# Patient Record
Sex: MALE | Race: WHITE | Employment: UNEMPLOYED | URBAN - METROPOLITAN AREA
[De-identification: names, ages, dates, MRNs, and addresses within clinical notes are randomized per-mention and may not be internally consistent; named-entity substitution may affect disease eponyms.]

---

## 2017-01-13 ENCOUNTER — TELEPHONE (OUTPATIENT)
Dept: FAMILY MEDICINE CLINIC | Age: 47
End: 2017-01-13

## 2017-01-25 ENCOUNTER — OFFICE VISIT (OUTPATIENT)
Dept: PSYCHIATRY | Age: 47
End: 2017-01-25

## 2017-01-25 ENCOUNTER — OFFICE VISIT (OUTPATIENT)
Dept: FAMILY MEDICINE CLINIC | Age: 47
End: 2017-01-25

## 2017-01-25 VITALS
DIASTOLIC BLOOD PRESSURE: 72 MMHG | HEART RATE: 70 BPM | HEIGHT: 69 IN | BODY MASS INDEX: 46.65 KG/M2 | OXYGEN SATURATION: 98 % | WEIGHT: 315 LBS | SYSTOLIC BLOOD PRESSURE: 124 MMHG

## 2017-01-25 VITALS
WEIGHT: 315 LBS | RESPIRATION RATE: 20 BRPM | SYSTOLIC BLOOD PRESSURE: 132 MMHG | HEART RATE: 68 BPM | TEMPERATURE: 98.7 F | DIASTOLIC BLOOD PRESSURE: 70 MMHG | BODY MASS INDEX: 56.53 KG/M2

## 2017-01-25 DIAGNOSIS — E11.9 TYPE 2 DIABETES, HBA1C GOAL < 8% (HCC): ICD-10-CM

## 2017-01-25 DIAGNOSIS — F41.9 ANXIETY: ICD-10-CM

## 2017-01-25 DIAGNOSIS — R39.15 URINARY URGENCY: ICD-10-CM

## 2017-01-25 DIAGNOSIS — N40.1 BENIGN PROSTATIC HYPERPLASIA WITH LOWER URINARY TRACT SYMPTOMS, UNSPECIFIED MORPHOLOGY: Primary | ICD-10-CM

## 2017-01-25 DIAGNOSIS — F51.05 INSOMNIA DUE TO OTHER MENTAL DISORDER: ICD-10-CM

## 2017-01-25 DIAGNOSIS — F99 INSOMNIA DUE TO OTHER MENTAL DISORDER: ICD-10-CM

## 2017-01-25 DIAGNOSIS — F43.10 PTSD (POST-TRAUMATIC STRESS DISORDER): Primary | ICD-10-CM

## 2017-01-25 LAB
ANION GAP SERPL CALCULATED.3IONS-SCNC: 12 MMOL/L (ref 3–16)
BILIRUBIN, POC: NEGATIVE
BLOOD URINE, POC: NORMAL
BUN BLDV-MCNC: 15 MG/DL (ref 7–20)
CALCIUM SERPL-MCNC: 9 MG/DL (ref 8.3–10.6)
CHLORIDE BLD-SCNC: 102 MMOL/L (ref 99–110)
CLARITY, POC: CLEAR
CO2: 28 MMOL/L (ref 21–32)
COLOR, POC: YELLOW
CREAT SERPL-MCNC: 0.6 MG/DL (ref 0.9–1.3)
GFR AFRICAN AMERICAN: >60
GFR NON-AFRICAN AMERICAN: >60
GLUCOSE BLD-MCNC: 142 MG/DL (ref 70–99)
GLUCOSE URINE, POC: 2000
KETONES, POC: NEGATIVE
LEUKOCYTE EST, POC: NORMAL
NITRITE, POC: NEGATIVE
PH, POC: 5
POTASSIUM SERPL-SCNC: 4.3 MMOL/L (ref 3.5–5.1)
PROSTATE SPECIFIC ANTIGEN: 0.19 NG/ML (ref 0–4)
PROTEIN, POC: NEGATIVE
SODIUM BLD-SCNC: 142 MMOL/L (ref 136–145)
SPECIFIC GRAVITY, POC: 1.01
UROBILINOGEN, POC: 0.2

## 2017-01-25 PROCEDURE — 99213 OFFICE O/P EST LOW 20 MIN: CPT | Performed by: FAMILY MEDICINE

## 2017-01-25 PROCEDURE — 81002 URINALYSIS NONAUTO W/O SCOPE: CPT | Performed by: FAMILY MEDICINE

## 2017-01-25 PROCEDURE — 99214 OFFICE O/P EST MOD 30 MIN: CPT | Performed by: PSYCHIATRY & NEUROLOGY

## 2017-01-25 RX ORDER — CLONAZEPAM 1 MG/1
1 TABLET ORAL 2 TIMES DAILY PRN
Qty: 60 TABLET | Refills: 1 | Status: SHIPPED | OUTPATIENT
Start: 2017-01-25 | End: 2017-06-07 | Stop reason: SDUPTHER

## 2017-01-25 RX ORDER — CIPROFLOXACIN 500 MG/1
500 TABLET, FILM COATED ORAL 2 TIMES DAILY
Qty: 20 TABLET | Refills: 0 | Status: SHIPPED | OUTPATIENT
Start: 2017-01-25 | End: 2017-02-04

## 2017-01-25 RX ORDER — TAMSULOSIN HYDROCHLORIDE 0.4 MG/1
CAPSULE ORAL
Qty: 30 CAPSULE | Refills: 5 | Status: SHIPPED | OUTPATIENT
Start: 2017-01-25 | End: 2017-03-20

## 2017-01-25 RX ORDER — SIMVASTATIN 20 MG
TABLET ORAL
Qty: 90 TABLET | Refills: 0 | Status: SHIPPED | OUTPATIENT
Start: 2017-01-25 | End: 2017-02-13 | Stop reason: SDUPTHER

## 2017-01-26 LAB
ESTIMATED AVERAGE GLUCOSE: 171.4 MG/DL
HBA1C MFR BLD: 7.6 %

## 2017-01-27 ENCOUNTER — TELEPHONE (OUTPATIENT)
Dept: FAMILY MEDICINE CLINIC | Age: 47
End: 2017-01-27

## 2017-01-27 LAB
ORGANISM: ABNORMAL
URINE CULTURE, ROUTINE: ABNORMAL
URINE CULTURE, ROUTINE: ABNORMAL

## 2017-02-13 ENCOUNTER — OFFICE VISIT (OUTPATIENT)
Dept: FAMILY MEDICINE CLINIC | Age: 47
End: 2017-02-13

## 2017-02-13 ENCOUNTER — TELEPHONE (OUTPATIENT)
Dept: FAMILY MEDICINE CLINIC | Age: 47
End: 2017-02-13

## 2017-02-13 VITALS
BODY MASS INDEX: 56.74 KG/M2 | RESPIRATION RATE: 16 BRPM | SYSTOLIC BLOOD PRESSURE: 142 MMHG | TEMPERATURE: 98.2 F | HEART RATE: 74 BPM | DIASTOLIC BLOOD PRESSURE: 82 MMHG | WEIGHT: 315 LBS

## 2017-02-13 DIAGNOSIS — R31.29 HEMATURIA, MICROSCOPIC: ICD-10-CM

## 2017-02-13 DIAGNOSIS — R82.90 ABNORMAL URINE ODOR: ICD-10-CM

## 2017-02-13 DIAGNOSIS — I10 ESSENTIAL HYPERTENSION: Primary | Chronic | ICD-10-CM

## 2017-02-13 DIAGNOSIS — N39.41 URGE INCONTINENCE OF URINE: ICD-10-CM

## 2017-02-13 DIAGNOSIS — F43.10 POST TRAUMATIC STRESS DISORDER: Chronic | ICD-10-CM

## 2017-02-13 DIAGNOSIS — E11.9 TYPE 2 DIABETES, HBA1C GOAL < 8% (HCC): ICD-10-CM

## 2017-02-13 LAB
APPEARANCE FLUID: CLEAR
BILIRUBIN, POC: NEGATIVE
BLOOD URINE, POC: NORMAL
CLARITY, POC: CLEAR
COLOR, POC: YELLOW
GLUCOSE URINE, POC: 2000
KETONES, POC: NEGATIVE
LEUKOCYTE EST, POC: NORMAL
NITRITE, POC: NORMAL
PH, POC: 5
PROTEIN, POC: NEGATIVE
SPECIFIC GRAVITY, POC: 1
UROBILINOGEN, POC: 0.2

## 2017-02-13 PROCEDURE — 81002 URINALYSIS NONAUTO W/O SCOPE: CPT | Performed by: FAMILY MEDICINE

## 2017-02-13 PROCEDURE — 99214 OFFICE O/P EST MOD 30 MIN: CPT | Performed by: FAMILY MEDICINE

## 2017-02-13 RX ORDER — SIMVASTATIN 20 MG
TABLET ORAL
Qty: 90 TABLET | Refills: 0 | Status: SHIPPED | OUTPATIENT
Start: 2017-02-13 | End: 2017-04-24 | Stop reason: SDUPTHER

## 2017-02-13 RX ORDER — TRIAMTERENE AND HYDROCHLOROTHIAZIDE 37.5; 25 MG/1; MG/1
1 TABLET ORAL DAILY
Qty: 90 TABLET | Refills: 3 | Status: SHIPPED | OUTPATIENT
Start: 2017-02-13 | End: 2018-03-03 | Stop reason: SDUPTHER

## 2017-02-13 RX ORDER — AMLODIPINE BESYLATE 10 MG/1
10 TABLET ORAL DAILY
Qty: 30 TABLET | Refills: 5 | Status: SHIPPED | OUTPATIENT
Start: 2017-02-13 | End: 2017-08-16 | Stop reason: SDUPTHER

## 2017-02-13 RX ORDER — CLONIDINE HYDROCHLORIDE 0.3 MG/1
TABLET ORAL
Qty: 60 TABLET | Refills: 5 | Status: SHIPPED | OUTPATIENT
Start: 2017-02-13 | End: 2017-08-16 | Stop reason: SDUPTHER

## 2017-02-13 RX ORDER — LISINOPRIL 40 MG/1
TABLET ORAL
Qty: 30 TABLET | Refills: 5 | Status: SHIPPED | OUTPATIENT
Start: 2017-02-13 | End: 2017-09-30 | Stop reason: SDUPTHER

## 2017-02-15 LAB — URINE CULTURE, ROUTINE: NORMAL

## 2017-03-02 ENCOUNTER — OFFICE VISIT (OUTPATIENT)
Dept: FAMILY MEDICINE CLINIC | Age: 47
End: 2017-03-02

## 2017-03-02 VITALS
DIASTOLIC BLOOD PRESSURE: 60 MMHG | WEIGHT: 315 LBS | OXYGEN SATURATION: 95 % | SYSTOLIC BLOOD PRESSURE: 124 MMHG | BODY MASS INDEX: 57 KG/M2 | HEART RATE: 82 BPM

## 2017-03-02 DIAGNOSIS — Z01.818 PREOP EXAMINATION: Primary | ICD-10-CM

## 2017-03-02 DIAGNOSIS — N47.8 EXCESSIVE FORESKIN: ICD-10-CM

## 2017-03-02 PROCEDURE — 99241 PR OFFICE CONSULTATION NEW/ESTAB PATIENT 15 MIN: CPT | Performed by: FAMILY MEDICINE

## 2017-03-08 ENCOUNTER — OFFICE VISIT (OUTPATIENT)
Dept: CARDIOLOGY CLINIC | Age: 47
End: 2017-03-08

## 2017-03-08 ENCOUNTER — TELEPHONE (OUTPATIENT)
Dept: FAMILY MEDICINE CLINIC | Age: 47
End: 2017-03-08

## 2017-03-08 VITALS
WEIGHT: 315 LBS | DIASTOLIC BLOOD PRESSURE: 88 MMHG | HEART RATE: 74 BPM | SYSTOLIC BLOOD PRESSURE: 150 MMHG | HEIGHT: 70 IN | BODY MASS INDEX: 45.1 KG/M2

## 2017-03-08 DIAGNOSIS — R94.31 ABNORMAL EKG: ICD-10-CM

## 2017-03-08 DIAGNOSIS — I10 ESSENTIAL HYPERTENSION: Chronic | ICD-10-CM

## 2017-03-08 DIAGNOSIS — I45.10 RBBB: ICD-10-CM

## 2017-03-08 DIAGNOSIS — R07.9 CHEST PAIN, UNSPECIFIED TYPE: Primary | ICD-10-CM

## 2017-03-08 PROCEDURE — 99215 OFFICE O/P EST HI 40 MIN: CPT | Performed by: INTERNAL MEDICINE

## 2017-03-08 PROCEDURE — 93000 ELECTROCARDIOGRAM COMPLETE: CPT | Performed by: INTERNAL MEDICINE

## 2017-03-08 ASSESSMENT — ENCOUNTER SYMPTOMS
CHOKING: 0
ABDOMINAL DISTENTION: 0
APNEA: 0
SHORTNESS OF BREATH: 0
CHEST TIGHTNESS: 0
COUGH: 0

## 2017-03-20 RX ORDER — TAMSULOSIN HYDROCHLORIDE 0.4 MG/1
CAPSULE ORAL
Qty: 30 CAPSULE | Refills: 4 | Status: SHIPPED | OUTPATIENT
Start: 2017-03-20 | End: 2018-02-17 | Stop reason: SDUPTHER

## 2017-03-29 ENCOUNTER — TELEPHONE (OUTPATIENT)
Dept: FAMILY MEDICINE CLINIC | Age: 47
End: 2017-03-29

## 2017-04-21 ENCOUNTER — TELEPHONE (OUTPATIENT)
Dept: PSYCHIATRY | Age: 47
End: 2017-04-21

## 2017-04-24 ENCOUNTER — OFFICE VISIT (OUTPATIENT)
Dept: FAMILY MEDICINE CLINIC | Age: 47
End: 2017-04-24

## 2017-04-24 VITALS
TEMPERATURE: 99 F | DIASTOLIC BLOOD PRESSURE: 66 MMHG | SYSTOLIC BLOOD PRESSURE: 126 MMHG | WEIGHT: 315 LBS | RESPIRATION RATE: 16 BRPM | HEART RATE: 74 BPM | BODY MASS INDEX: 55.39 KG/M2 | OXYGEN SATURATION: 96 %

## 2017-04-24 DIAGNOSIS — E11.9 TYPE 2 DIABETES, HBA1C GOAL < 7% (HCC): Primary | ICD-10-CM

## 2017-04-24 LAB
CREATININE URINE POCT: NORMAL
MICROALBUMIN/CREAT 24H UR: 20 MG/G{CREAT}
MICROALBUMIN/CREAT UR-RTO: NORMAL

## 2017-04-24 PROCEDURE — 82044 UR ALBUMIN SEMIQUANTITATIVE: CPT | Performed by: FAMILY MEDICINE

## 2017-04-24 PROCEDURE — 99213 OFFICE O/P EST LOW 20 MIN: CPT | Performed by: FAMILY MEDICINE

## 2017-04-24 RX ORDER — SIMVASTATIN 20 MG
TABLET ORAL
Qty: 90 TABLET | Refills: 0 | Status: SHIPPED | OUTPATIENT
Start: 2017-04-24 | End: 2017-08-05 | Stop reason: SDUPTHER

## 2017-04-25 LAB
CREATININE URINE: 86.3 MG/DL (ref 39–259)
MICROALBUMIN UR-MCNC: 1.4 MG/DL
MICROALBUMIN/CREAT UR-RTO: 16.2 MG/G (ref 0–30)

## 2017-05-24 ENCOUNTER — TELEPHONE (OUTPATIENT)
Dept: FAMILY MEDICINE CLINIC | Age: 47
End: 2017-05-24

## 2017-06-07 ENCOUNTER — OFFICE VISIT (OUTPATIENT)
Dept: PSYCHIATRY | Age: 47
End: 2017-06-07

## 2017-06-07 VITALS
BODY MASS INDEX: 45.1 KG/M2 | HEART RATE: 75 BPM | SYSTOLIC BLOOD PRESSURE: 142 MMHG | DIASTOLIC BLOOD PRESSURE: 78 MMHG | WEIGHT: 315 LBS | HEIGHT: 70 IN

## 2017-06-07 DIAGNOSIS — F43.10 PTSD (POST-TRAUMATIC STRESS DISORDER): Primary | ICD-10-CM

## 2017-06-07 DIAGNOSIS — F51.05 INSOMNIA DUE TO OTHER MENTAL DISORDER: ICD-10-CM

## 2017-06-07 DIAGNOSIS — F99 INSOMNIA DUE TO OTHER MENTAL DISORDER: ICD-10-CM

## 2017-06-07 DIAGNOSIS — F41.9 ANXIETY: ICD-10-CM

## 2017-06-07 PROCEDURE — 99214 OFFICE O/P EST MOD 30 MIN: CPT | Performed by: PSYCHIATRY & NEUROLOGY

## 2017-06-07 RX ORDER — FLUOXETINE HYDROCHLORIDE 40 MG/1
80 CAPSULE ORAL DAILY
Qty: 30 CAPSULE | Refills: 1 | Status: SHIPPED | OUTPATIENT
Start: 2017-06-07 | End: 2019-08-19

## 2017-06-07 RX ORDER — ARIPIPRAZOLE 10 MG/1
10 TABLET ORAL DAILY
Qty: 30 TABLET | Refills: 1 | Status: SHIPPED | OUTPATIENT
Start: 2017-06-07 | End: 2017-10-13 | Stop reason: SDUPTHER

## 2017-06-07 RX ORDER — CLONAZEPAM 1 MG/1
1 TABLET ORAL 2 TIMES DAILY PRN
Qty: 60 TABLET | Refills: 1 | Status: SHIPPED | OUTPATIENT
Start: 2017-06-07 | End: 2019-08-19

## 2017-06-23 ENCOUNTER — OFFICE VISIT (OUTPATIENT)
Dept: FAMILY MEDICINE CLINIC | Age: 47
End: 2017-06-23

## 2017-06-23 VITALS
RESPIRATION RATE: 16 BRPM | BODY MASS INDEX: 55.64 KG/M2 | HEART RATE: 76 BPM | OXYGEN SATURATION: 95 % | WEIGHT: 315 LBS | DIASTOLIC BLOOD PRESSURE: 74 MMHG | SYSTOLIC BLOOD PRESSURE: 137 MMHG

## 2017-06-23 DIAGNOSIS — F43.10 POST TRAUMATIC STRESS DISORDER: Primary | Chronic | ICD-10-CM

## 2017-06-23 DIAGNOSIS — M54.16 RIGHT LUMBAR RADICULOPATHY: ICD-10-CM

## 2017-06-23 DIAGNOSIS — M79.604 RIGHT LEG PAIN: ICD-10-CM

## 2017-06-23 PROCEDURE — 99213 OFFICE O/P EST LOW 20 MIN: CPT | Performed by: FAMILY MEDICINE

## 2017-08-05 DIAGNOSIS — E11.9 TYPE 2 DIABETES, HBA1C GOAL < 7% (HCC): ICD-10-CM

## 2017-08-07 RX ORDER — SIMVASTATIN 20 MG
TABLET ORAL
Qty: 90 TABLET | Refills: 0 | Status: SHIPPED | OUTPATIENT
Start: 2017-08-07 | End: 2018-01-02 | Stop reason: SDUPTHER

## 2017-08-15 ENCOUNTER — TELEPHONE (OUTPATIENT)
Dept: FAMILY MEDICINE CLINIC | Age: 47
End: 2017-08-15

## 2017-08-16 DIAGNOSIS — I10 ESSENTIAL HYPERTENSION: Chronic | ICD-10-CM

## 2017-08-16 RX ORDER — CLONIDINE HYDROCHLORIDE 0.3 MG/1
TABLET ORAL
Qty: 60 TABLET | Refills: 2 | Status: SHIPPED | OUTPATIENT
Start: 2017-08-16 | End: 2018-01-02 | Stop reason: SDUPTHER

## 2017-08-16 RX ORDER — AMLODIPINE BESYLATE 10 MG/1
TABLET ORAL
Qty: 30 TABLET | Refills: 2 | Status: SHIPPED | OUTPATIENT
Start: 2017-08-16 | End: 2017-12-20 | Stop reason: SDUPTHER

## 2017-08-16 RX ORDER — DAPAGLIFLOZIN AND METFORMIN HYDROCHLORIDE 10; 1000 MG/1; MG/1
TABLET, FILM COATED, EXTENDED RELEASE ORAL
Qty: 30 TABLET | Refills: 2 | Status: SHIPPED | OUTPATIENT
Start: 2017-08-16 | End: 2017-11-14 | Stop reason: SDUPTHER

## 2017-08-24 ENCOUNTER — OFFICE VISIT (OUTPATIENT)
Dept: FAMILY MEDICINE CLINIC | Age: 47
End: 2017-08-24

## 2017-08-24 VITALS
DIASTOLIC BLOOD PRESSURE: 78 MMHG | WEIGHT: 315 LBS | OXYGEN SATURATION: 92 % | HEART RATE: 79 BPM | SYSTOLIC BLOOD PRESSURE: 135 MMHG | RESPIRATION RATE: 18 BRPM | BODY MASS INDEX: 56.39 KG/M2

## 2017-08-24 DIAGNOSIS — F43.10 PTSD (POST-TRAUMATIC STRESS DISORDER): Primary | ICD-10-CM

## 2017-08-24 PROCEDURE — 99213 OFFICE O/P EST LOW 20 MIN: CPT | Performed by: FAMILY MEDICINE

## 2017-08-30 ENCOUNTER — TELEPHONE (OUTPATIENT)
Dept: PSYCHIATRY | Age: 47
End: 2017-08-30

## 2017-09-06 ENCOUNTER — CLINICAL DOCUMENTATION (OUTPATIENT)
Dept: PSYCHIATRY | Age: 47
End: 2017-09-06

## 2017-09-08 RX ORDER — FLUOXETINE HYDROCHLORIDE 40 MG/1
80 CAPSULE ORAL DAILY
Qty: 60 CAPSULE | Refills: 5 | OUTPATIENT
Start: 2017-09-08

## 2017-09-08 RX ORDER — CLONAZEPAM 1 MG/1
1 TABLET ORAL 2 TIMES DAILY PRN
Qty: 60 TABLET | Refills: 2 | OUTPATIENT
Start: 2017-09-08

## 2017-09-30 DIAGNOSIS — I10 ESSENTIAL HYPERTENSION: Chronic | ICD-10-CM

## 2017-09-30 DIAGNOSIS — E11.9 TYPE 2 DIABETES, HBA1C GOAL < 8% (HCC): ICD-10-CM

## 2017-10-02 RX ORDER — LISINOPRIL 40 MG/1
TABLET ORAL
Qty: 30 TABLET | Refills: 4 | Status: SHIPPED | OUTPATIENT
Start: 2017-10-02 | End: 2018-03-23 | Stop reason: SDUPTHER

## 2017-10-13 RX ORDER — ARIPIPRAZOLE 10 MG/1
10 TABLET ORAL DAILY
Qty: 30 TABLET | Refills: 1 | Status: SHIPPED | OUTPATIENT
Start: 2017-10-13 | End: 2018-01-02 | Stop reason: SDUPTHER

## 2017-10-13 NOTE — TELEPHONE ENCOUNTER
Refill request for abilify medication. Name of Kevin Colón    Last visit - 8/24/17 with Dr. Bert Traore     Pending visit - 11/8/17    Last refill -6/7/17    Additional Comments Rx request came over under Dr. Araceli Woodson name but pt sees Dr. Bert Traore for primary care. Refill encounter created under wrong PCP's name but routed to the correct physician.

## 2017-11-08 ENCOUNTER — OFFICE VISIT (OUTPATIENT)
Dept: FAMILY MEDICINE CLINIC | Age: 47
End: 2017-11-08

## 2017-11-08 VITALS
TEMPERATURE: 97.9 F | BODY MASS INDEX: 58.6 KG/M2 | HEART RATE: 85 BPM | RESPIRATION RATE: 20 BRPM | WEIGHT: 315 LBS | DIASTOLIC BLOOD PRESSURE: 70 MMHG | SYSTOLIC BLOOD PRESSURE: 126 MMHG | OXYGEN SATURATION: 94 %

## 2017-11-08 DIAGNOSIS — E11.9 TYPE 2 DIABETES MELLITUS WITH HEMOGLOBIN A1C GOAL OF 7.0%-8.0% (HCC): Primary | ICD-10-CM

## 2017-11-08 DIAGNOSIS — M54.16 RIGHT LUMBAR RADICULOPATHY: ICD-10-CM

## 2017-11-08 DIAGNOSIS — H60.312 ACUTE DIFFUSE OTITIS EXTERNA OF LEFT EAR: ICD-10-CM

## 2017-11-08 PROCEDURE — 99214 OFFICE O/P EST MOD 30 MIN: CPT | Performed by: FAMILY MEDICINE

## 2017-11-08 RX ORDER — OXYCODONE HYDROCHLORIDE AND ACETAMINOPHEN 5; 325 MG/1; MG/1
1 TABLET ORAL EVERY 8 HOURS PRN
Qty: 8 TABLET | Refills: 0 | Status: SHIPPED | OUTPATIENT
Start: 2017-11-08 | End: 2017-11-15

## 2017-11-13 DIAGNOSIS — E11.9 TYPE 2 DIABETES MELLITUS WITH HEMOGLOBIN A1C GOAL OF 7.0%-8.0% (HCC): ICD-10-CM

## 2017-11-13 LAB
ANION GAP SERPL CALCULATED.3IONS-SCNC: 16 MMOL/L (ref 3–16)
BUN BLDV-MCNC: 21 MG/DL (ref 7–20)
CALCIUM SERPL-MCNC: 9.1 MG/DL (ref 8.3–10.6)
CHLORIDE BLD-SCNC: 99 MMOL/L (ref 99–110)
CHOLESTEROL, TOTAL: 176 MG/DL (ref 0–199)
CO2: 25 MMOL/L (ref 21–32)
CREAT SERPL-MCNC: 0.6 MG/DL (ref 0.9–1.3)
CREATININE URINE: 79.7 MG/DL (ref 39–259)
ESTIMATED AVERAGE GLUCOSE: 223.1 MG/DL
GFR AFRICAN AMERICAN: >60
GFR NON-AFRICAN AMERICAN: >60
GLUCOSE BLD-MCNC: 201 MG/DL (ref 70–99)
HBA1C MFR BLD: 9.4 %
HDLC SERPL-MCNC: 63 MG/DL (ref 40–60)
LDL CHOLESTEROL CALCULATED: 96 MG/DL
MICROALBUMIN UR-MCNC: <1.2 MG/DL
MICROALBUMIN/CREAT UR-RTO: NORMAL MG/G (ref 0–30)
POTASSIUM SERPL-SCNC: 4.5 MMOL/L (ref 3.5–5.1)
SODIUM BLD-SCNC: 140 MMOL/L (ref 136–145)
TRIGL SERPL-MCNC: 85 MG/DL (ref 0–150)
VLDLC SERPL CALC-MCNC: 17 MG/DL

## 2017-11-14 DIAGNOSIS — E11.9 TYPE 2 DIABETES MELLITUS WITH HEMOGLOBIN A1C GOAL OF LESS THAN 7.5% (HCC): Primary | ICD-10-CM

## 2017-11-14 NOTE — PROGRESS NOTES
[facility]  Progress Note  Mikey Arevalo          Bree Crabtreealice  1970    11/14/17    Chief Complaint:   Africa Carbone is a 49 y.o. male who is here for three-month follow-up and additional complaint of the ear. HPI:   onset 1 day. left ear pain, clear drainage, left jaw pain, HA. Diabetes Mellitus Type II:  Home blood sugar records: patient does not test.  No significant episodes of hypoglycemia. No polyuria, polydipsia, visual changes, foot problems, GI upset. Diabetic diet compliance:  noncompliant: *,  Weight trend: stable. Current exercise: Through work. Eye exam current (within one year): unknown. Lastly he experiences chronic lower back pain. His work requires lifting. He requests a few tablets of Percocet which she is taken in the past without any problem    ROS negative for headache, vision changes, chest pain, shortness of breath, abdominal pain, urinary sx, bowel changes. Past medical, surgical, and social history reviewed. Medications and allergies reviewed. No Known Allergies  Prior to Visit Medications    Medication Sig Taking? Authorizing Provider   neomycin-polymyxin-hydrocortisone (CORTISPORIN) 3.5-50629-3 otic solution Place 3 drops into the left ear 3 times daily for 10 days Yes Ashwin Mckenzie, DO   oxyCODONE-acetaminophen (PERCOCET) 5-325 MG per tablet Take 1 tablet by mouth every 8 hours as needed for Pain .  Yes Ashwin Mckenzie DO   ARIPiprazole (ABILIFY) 10 MG tablet Take 1 tablet by mouth daily Yes Ashwin Mckenzie DO   lisinopril (PRINIVIL;ZESTRIL) 40 MG tablet TAKE ONE TABLET BY MOUTH DAILY Yes Tor Mckenzie, DO   XIGDUO XR  MG TB24 TAKE ONE TABLET BY MOUTH DAILY Yes Nicole Joyner MD   amLODIPine (NORVASC) 10 MG tablet TAKE ONE TABLET BY MOUTH DAILY Yes Nicole Joyner MD   cloNIDine (CATAPRES) 0.3 MG tablet TAKE ONE TABLET BY MOUTH TWICE A DAY Yes Nicole Joyner MD   simvastatin (ZOCOR) 20 MG tablet TAKE ONE traumatic stress disorder    Claustrophobia    Anemia    Right foot pain    Left foot pain    Right bundle branch block    Right ankle pain    BMI 50.0-59.9, adult ([address]    Well controlled type 2 diabetes mellitus (HCC)    Ankle arthritis    Congenital tarsal coalition    Osteoarthritis of subtalar joint    Osteoarthritis of right subtalar joint    Tarsal coalitions    Ankle abrasion with infection    Wound infection after surgery    Type 2 diabetes mellitus without complication ([address]    Morbid obesity due to excess calories ([address]           Immunization History   Administered Date(s) Administered    Influenza Virus Vaccine 03/24/2016       Past Medical History:   Diagnosis Date    Abdominal wall hernia 2/21/2012    Anemia 4/15/2013    Claustrophobia 4/15/2013    Depression 3/8/2011    Diabetes mellitus, type 2 ([address] 2/20/2012    Family history of premature coronary artery disease 6/19/2012    Hyperlipidemia 2/27/2012    Hypertension 3/8/2011    Insomnia 2/17/2012    Left atrial dilation 6/19/2012    Left ventricular hypertrophy 6/19/2012    Neuropathy ([address]     Obesity 3/8/2011    Obstructive sleep apnea 2/17/2012    uses Cpap sometimes    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    Post traumatic stress disorder 4/15/2013    Right atrial dilation 6/19/2012    Right bundle branch block 5/15/2014    Right ventricular dilation 6/19/2012     Past Surgical History:   Procedure Laterality Date    CARDIOVASCULAR STRESS TEST  May 28 2014    Non-Imaging Stress Test    CIRCUMCISION  03/2017    Due to phimosis: Dr. Carlin Bear Right 2/1/2016    ankle subtower fusion with bone graft using c-arm     Family History   Problem Relation Age of Onset    Heart Disease Mother     Coronary Art Dis Mother     Heart Attack Mother     Mental Illness Mother      Social History     Social History    Marital status:      Spouse patient instructions. Return in about 3 months (around 2/8/2018). There are no Patient Instructions on file for this visit. Please note a portion of this chart was generated using dragon dictation software. Although every effort was made to ensure the accuracy of this automated transcription, some errors in transcription may have occurred.

## 2017-11-17 LAB — DIABETIC RETINOPATHY: NEGATIVE

## 2017-12-20 DIAGNOSIS — I10 ESSENTIAL HYPERTENSION: Chronic | ICD-10-CM

## 2017-12-20 RX ORDER — AMLODIPINE BESYLATE 10 MG/1
TABLET ORAL
Qty: 30 TABLET | Refills: 1 | Status: SHIPPED | OUTPATIENT
Start: 2017-12-20 | End: 2018-02-17 | Stop reason: SDUPTHER

## 2018-01-02 DIAGNOSIS — I10 ESSENTIAL HYPERTENSION: Chronic | ICD-10-CM

## 2018-01-02 DIAGNOSIS — E11.9 TYPE 2 DIABETES, HBA1C GOAL < 7% (HCC): ICD-10-CM

## 2018-01-03 DIAGNOSIS — I10 ESSENTIAL HYPERTENSION: Chronic | ICD-10-CM

## 2018-01-03 RX ORDER — CLONIDINE HYDROCHLORIDE 0.3 MG/1
TABLET ORAL
Qty: 60 TABLET | Refills: 1 | Status: SHIPPED | OUTPATIENT
Start: 2018-01-03 | End: 2018-07-31

## 2018-01-03 RX ORDER — SIMVASTATIN 20 MG
TABLET ORAL
Qty: 30 TABLET | Refills: 0 | Status: SHIPPED | OUTPATIENT
Start: 2018-01-03 | End: 2018-07-31

## 2018-01-03 RX ORDER — ARIPIPRAZOLE 10 MG/1
TABLET ORAL
Qty: 30 TABLET | Refills: 0 | Status: SHIPPED | OUTPATIENT
Start: 2018-01-03 | End: 2019-04-04

## 2018-01-04 RX ORDER — CLONIDINE HYDROCHLORIDE 0.3 MG/1
TABLET ORAL
Qty: 60 TABLET | Refills: 5 | Status: SHIPPED | OUTPATIENT
Start: 2018-01-04 | End: 2018-11-08 | Stop reason: SDUPTHER

## 2018-01-22 DIAGNOSIS — E11.9 TYPE 2 DIABETES MELLITUS WITHOUT COMPLICATION (HCC): Chronic | ICD-10-CM

## 2018-02-08 ENCOUNTER — OFFICE VISIT (OUTPATIENT)
Dept: FAMILY MEDICINE CLINIC | Age: 48
End: 2018-02-08

## 2018-02-08 VITALS
HEART RATE: 90 BPM | DIASTOLIC BLOOD PRESSURE: 101 MMHG | BODY MASS INDEX: 57.68 KG/M2 | WEIGHT: 315 LBS | SYSTOLIC BLOOD PRESSURE: 163 MMHG | OXYGEN SATURATION: 96 %

## 2018-02-08 DIAGNOSIS — E11.9 TYPE 2 DIABETES MELLITUS WITH HEMOGLOBIN A1C GOAL OF 7.0%-8.0% (HCC): ICD-10-CM

## 2018-02-08 DIAGNOSIS — F51.05 INSOMNIA DUE TO MENTAL DISORDER: ICD-10-CM

## 2018-02-08 DIAGNOSIS — I10 ESSENTIAL HYPERTENSION: Chronic | ICD-10-CM

## 2018-02-08 DIAGNOSIS — F43.10 POST TRAUMATIC STRESS DISORDER: Primary | Chronic | ICD-10-CM

## 2018-02-08 DIAGNOSIS — F41.9 ANXIETY: ICD-10-CM

## 2018-02-08 PROCEDURE — 96127 BRIEF EMOTIONAL/BEHAV ASSMT: CPT | Performed by: FAMILY MEDICINE

## 2018-02-08 PROCEDURE — 99214 OFFICE O/P EST MOD 30 MIN: CPT | Performed by: FAMILY MEDICINE

## 2018-02-08 ASSESSMENT — PATIENT HEALTH QUESTIONNAIRE - PHQ9
9. THOUGHTS THAT YOU WOULD BE BETTER OFF DEAD, OR OF HURTING YOURSELF: 0
SUM OF ALL RESPONSES TO PHQ QUESTIONS 1-9: 16
3. TROUBLE FALLING OR STAYING ASLEEP: 3
6. FEELING BAD ABOUT YOURSELF - OR THAT YOU ARE A FAILURE OR HAVE LET YOURSELF OR YOUR FAMILY DOWN: 3
4. FEELING TIRED OR HAVING LITTLE ENERGY: 3
8. MOVING OR SPEAKING SO SLOWLY THAT OTHER PEOPLE COULD HAVE NOTICED. OR THE OPPOSITE, BEING SO FIGETY OR RESTLESS THAT YOU HAVE BEEN MOVING AROUND A LOT MORE THAN USUAL: 1
2. FEELING DOWN, DEPRESSED OR HOPELESS: 3
5. POOR APPETITE OR OVEREATING: 0
10. IF YOU CHECKED OFF ANY PROBLEMS, HOW DIFFICULT HAVE THESE PROBLEMS MADE IT FOR YOU TO DO YOUR WORK, TAKE CARE OF THINGS AT HOME, OR GET ALONG WITH OTHER PEOPLE: 3
7. TROUBLE CONCENTRATING ON THINGS, SUCH AS READING THE NEWSPAPER OR WATCHING TELEVISION: 3
SUM OF ALL RESPONSES TO PHQ9 QUESTIONS 1 & 2: 3
1. LITTLE INTEREST OR PLEASURE IN DOING THINGS: 0

## 2018-02-08 NOTE — PROGRESS NOTES
[facility]  Progress Note  DO Ovi Ramos  1970    02/08/18    Chief Complaint:   Lea Cui is a 49 y.o. male who is here for family medical leave act paperwork for PTSD along with diabetes    HPI:   I have completed the FMLA intermittently paperwork for patient. The 6 month cycle and February 14. He asked that I renew the paperwork. Do not have a blank form with him so I went online. We'll request for up to 4 shows per month as he continues to experience bouts of panic attack and depression. 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 Taking medication as directed most days the week but did forget to take his blood pressure medications Is due for lab work. Had a normal stress test 2016. ROS negative for headache, vision changes, chest pain, shortness of breath, abdominal pain, urinary sx, bowel changes. Past medical, surgical, and social history reviewed. Medications and allergies reviewed. No Known Allergies  Prior to Visit Medications    Medication Sig Taking?  Authorizing Provider   metFORMIN (GLUCOPHAGE) 500 MG tablet TAKE ONE TABLET BY MOUTH TWICE A DAY WITH MEALS Yes Tor Mckenzie DO   cloNIDine (CATAPRES) 0.3 MG tablet TAKE ONE TABLET BY MOUTH TWICE A DAY Yes Beba Caro MD   ARIPiprazole (ABILIFY) 10 MG tablet TAKE ONE TABLET BY MOUTH DAILY Yes Richelle Mckenzie DO   cloNIDine (CATAPRES) 0.3 MG tablet TAKE ONE TABLET BY MOUTH TWICE A DAY Yes Tor Mckenzie DO   simvastatin (ZOCOR) 20 MG tablet TAKE ONE TABLET BY MOUTH EVERY EVENING Yes Tor Mckenzie DO   amLODIPine (NORVASC) 10 MG tablet TAKE ONE TABLET BY MOUTH DAILY Yes Richelle Mckenzie DO   Dapagliflozin-Metformin HCl ER (XIGDUO XR)  MG TB24 TAKE ONE TABLET BY MOUTH DAILY Yes Richelle Mckenzie DO   metFORMIN (GLUCOPHAGE) 1000 MG tablet Take 1 tablet by mouth daily Yes Trina Mckenzie DO   lisinopril (PRINIVIL;ZESTRIL) 40 MG tablet TAKE ONE TABLET BY MOUTH DAILY Yes Trina Mckenzie DO   FLUoxetine (PROZAC) 40 MG capsule Take 2 capsules by mouth daily Stop taking the Fluoxetine tablets, start taking these capsules. Yes Jose M Rhodes MD   clonazePAM (KLONOPIN) 1 MG tablet Take 1 tablet by mouth 2 times daily as needed for Anxiety Yes Jose M Rhodes MD   tamsulosin (FLOMAX) 0.4 MG capsule TAKE ONE CAPSULE BY MOUTH ONCE NIGHTLY Yes Madi Valle MD   triamterene-hydrochlorothiazide (MAXZIDE-25) 37.5-25 MG per tablet Take 1 tablet by mouth daily Yes Trina Mckenzie DO   COCONUT OIL PO Take by mouth Yes Historical Provider, MD   Cinnamon 500 MG CAPS Take by mouth Yes Historical Provider, MD MALDONADO OMEGA-3 KRILL OIL PO Take by mouth Yes Historical Provider, MD   fluocinonide (LIDEX) 0.05 % cream APPLY TO AFFECTED AREA(S) TWO TIMES A DAY UNTIL HEALED Yes Trina Mckenzie DO   aspirin EC 81 MG EC tablet Take 81 mg by mouth daily.  Yes Historical Provider, MD          Vitals:    02/08/18 0948 02/08/18 1043   BP: (!) 163/101 (!) 163/101   Site: Right Arm    Position: Sitting    Cuff Size: Large Adult    Pulse: 90    SpO2: 96%    Weight: (!) 402 lb (182.3 kg)       Wt Readings from Last 3 Encounters:   02/08/18 (!) 402 lb (182.3 kg)   11/08/17 (!) 408 lb 6.4 oz (185.2 kg)   08/24/17 (!) 393 lb (178.3 kg)     BP Readings from Last 3 Encounters:   02/08/18 (!) 163/101   11/08/17 126/70   08/24/17 135/78       Patient Active Problem List   Diagnosis    Hypertension    Depression    Right leg pain    Eczema    Right lumbar radiculopathy    Urinary urgency    Obstructive sleep apnea    Memory loss    Abdominal mass    Special screening for malignant neoplasm of prostate    Diabetes mellitus, type 2 (HCC)    Abdominal wall hernia    Hyperlipidemia    Chest pain    Left ventricular hypertrophy    Left atrial appointment    #3 is uncontrolled today likely due to not having taken medication. To continue tracking blood pressure home and call office if it's not at goal.      PLAN    FMLA paperwork to be faxed and copy given to patient. See rest of plan under patient instructions. Return in about 3 months (around 5/8/2018). There are no Patient Instructions on file for this visit. Please note a portion of this chart was generated using dragon dictation software. Although every effort was made to ensure the accuracy of this automated transcription, some errors in transcription may have occurred.

## 2018-02-17 DIAGNOSIS — I10 ESSENTIAL HYPERTENSION: Chronic | ICD-10-CM

## 2018-02-17 DIAGNOSIS — E11.9 TYPE 2 DIABETES MELLITUS WITH HEMOGLOBIN A1C GOAL OF LESS THAN 7.5% (HCC): ICD-10-CM

## 2018-02-17 LAB
A/G RATIO: 1.3 (ref 1.1–2.2)
ALBUMIN SERPL-MCNC: 3.9 G/DL (ref 3.4–5)
ALP BLD-CCNC: 77 U/L (ref 40–129)
ALT SERPL-CCNC: 133 U/L (ref 10–40)
ANION GAP SERPL CALCULATED.3IONS-SCNC: 14 MMOL/L (ref 3–16)
AST SERPL-CCNC: 74 U/L (ref 15–37)
BILIRUB SERPL-MCNC: <0.2 MG/DL (ref 0–1)
BUN BLDV-MCNC: 16 MG/DL (ref 7–20)
CALCIUM SERPL-MCNC: 9.1 MG/DL (ref 8.3–10.6)
CHLORIDE BLD-SCNC: 99 MMOL/L (ref 99–110)
CO2: 26 MMOL/L (ref 21–32)
CREAT SERPL-MCNC: 0.7 MG/DL (ref 0.9–1.3)
GFR AFRICAN AMERICAN: >60
GFR NON-AFRICAN AMERICAN: >60
GLOBULIN: 2.9 G/DL
GLUCOSE BLD-MCNC: 341 MG/DL (ref 70–99)
POTASSIUM SERPL-SCNC: 4 MMOL/L (ref 3.5–5.1)
SODIUM BLD-SCNC: 139 MMOL/L (ref 136–145)
TOTAL PROTEIN: 6.8 G/DL (ref 6.4–8.2)

## 2018-02-18 LAB
ESTIMATED AVERAGE GLUCOSE: 289.1 MG/DL
HBA1C MFR BLD: 11.7 %

## 2018-02-19 RX ORDER — ARIPIPRAZOLE 10 MG/1
TABLET ORAL
Qty: 30 TABLET | Refills: 0 | Status: SHIPPED | OUTPATIENT
Start: 2018-02-19 | End: 2018-03-23 | Stop reason: SDUPTHER

## 2018-02-19 RX ORDER — TAMSULOSIN HYDROCHLORIDE 0.4 MG/1
CAPSULE ORAL
Qty: 30 CAPSULE | Refills: 4 | Status: SHIPPED | OUTPATIENT
Start: 2018-02-19 | End: 2018-09-04 | Stop reason: SDUPTHER

## 2018-02-19 RX ORDER — AMLODIPINE BESYLATE 10 MG/1
TABLET ORAL
Qty: 30 TABLET | Refills: 0 | Status: SHIPPED | OUTPATIENT
Start: 2018-02-19 | End: 2018-03-23 | Stop reason: SDUPTHER

## 2018-03-03 DIAGNOSIS — I10 ESSENTIAL HYPERTENSION: Chronic | ICD-10-CM

## 2018-03-05 RX ORDER — TRIAMTERENE AND HYDROCHLOROTHIAZIDE 37.5; 25 MG/1; MG/1
TABLET ORAL
Qty: 30 TABLET | Refills: 2 | Status: SHIPPED | OUTPATIENT
Start: 2018-03-05 | End: 2019-01-30

## 2018-03-23 DIAGNOSIS — E11.9 TYPE 2 DIABETES, HBA1C GOAL < 8% (HCC): ICD-10-CM

## 2018-03-23 DIAGNOSIS — E11.9 TYPE 2 DIABETES MELLITUS WITHOUT COMPLICATION (HCC): Chronic | ICD-10-CM

## 2018-03-23 DIAGNOSIS — I10 ESSENTIAL HYPERTENSION: Chronic | ICD-10-CM

## 2018-03-23 RX ORDER — SIMVASTATIN 20 MG
TABLET ORAL
Qty: 30 TABLET | Refills: 5 | Status: SHIPPED | OUTPATIENT
Start: 2018-03-23 | End: 2018-10-07 | Stop reason: SDUPTHER

## 2018-03-23 RX ORDER — LISINOPRIL 40 MG/1
TABLET ORAL
Qty: 30 TABLET | Refills: 5 | Status: SHIPPED | OUTPATIENT
Start: 2018-03-23 | End: 2018-10-07 | Stop reason: SDUPTHER

## 2018-03-23 RX ORDER — TRIAMTERENE AND HYDROCHLOROTHIAZIDE 37.5; 25 MG/1; MG/1
TABLET ORAL
Qty: 30 TABLET | Refills: 5 | Status: SHIPPED | OUTPATIENT
Start: 2018-03-23 | End: 2018-07-31

## 2018-03-23 RX ORDER — ARIPIPRAZOLE 10 MG/1
TABLET ORAL
Qty: 30 TABLET | Refills: 5 | Status: SHIPPED | OUTPATIENT
Start: 2018-03-23 | End: 2018-07-31

## 2018-03-23 RX ORDER — AMLODIPINE BESYLATE 10 MG/1
TABLET ORAL
Qty: 30 TABLET | Refills: 5 | Status: SHIPPED | OUTPATIENT
Start: 2018-03-23 | End: 2018-07-31 | Stop reason: DRUGHIGH

## 2018-05-14 DIAGNOSIS — E11.9 TYPE 2 DIABETES MELLITUS WITH HEMOGLOBIN A1C GOAL OF LESS THAN 7.5% (HCC): ICD-10-CM

## 2018-05-14 RX ORDER — DAPAGLIFLOZIN AND METFORMIN HYDROCHLORIDE 10; 1000 MG/1; MG/1
TABLET, FILM COATED, EXTENDED RELEASE ORAL
Qty: 30 TABLET | Refills: 1 | Status: SHIPPED | OUTPATIENT
Start: 2018-05-14 | End: 2018-08-02 | Stop reason: SDUPTHER

## 2018-05-21 ENCOUNTER — OFFICE VISIT (OUTPATIENT)
Dept: FAMILY MEDICINE CLINIC | Age: 48
End: 2018-05-21

## 2018-05-21 VITALS
SYSTOLIC BLOOD PRESSURE: 134 MMHG | RESPIRATION RATE: 16 BRPM | OXYGEN SATURATION: 97 % | HEART RATE: 83 BPM | BODY MASS INDEX: 56.27 KG/M2 | WEIGHT: 315 LBS | TEMPERATURE: 98.5 F | DIASTOLIC BLOOD PRESSURE: 77 MMHG

## 2018-05-21 DIAGNOSIS — Z13.31 POSITIVE DEPRESSION SCREENING: ICD-10-CM

## 2018-05-21 DIAGNOSIS — E66.01 MORBID OBESITY DUE TO EXCESS CALORIES (HCC): ICD-10-CM

## 2018-05-21 DIAGNOSIS — E11.9 TYPE 2 DIABETES MELLITUS WITH HEMOGLOBIN A1C GOAL OF 7.0%-8.0% (HCC): Primary | ICD-10-CM

## 2018-05-21 PROCEDURE — 99214 OFFICE O/P EST MOD 30 MIN: CPT | Performed by: FAMILY MEDICINE

## 2018-05-21 PROCEDURE — G8431 POS CLIN DEPRES SCRN F/U DOC: HCPCS | Performed by: FAMILY MEDICINE

## 2018-06-02 DIAGNOSIS — E11.9 TYPE 2 DIABETES MELLITUS WITH HEMOGLOBIN A1C GOAL OF 7.0%-8.0% (HCC): ICD-10-CM

## 2018-06-02 LAB
A/G RATIO: 1.4 (ref 1.1–2.2)
ALBUMIN SERPL-MCNC: 4.2 G/DL (ref 3.4–5)
ALP BLD-CCNC: 70 U/L (ref 40–129)
ALT SERPL-CCNC: 94 U/L (ref 10–40)
ANION GAP SERPL CALCULATED.3IONS-SCNC: 17 MMOL/L (ref 3–16)
AST SERPL-CCNC: 39 U/L (ref 15–37)
BILIRUB SERPL-MCNC: 0.4 MG/DL (ref 0–1)
BUN BLDV-MCNC: 22 MG/DL (ref 7–20)
CALCIUM SERPL-MCNC: 9.1 MG/DL (ref 8.3–10.6)
CHLORIDE BLD-SCNC: 95 MMOL/L (ref 99–110)
CO2: 24 MMOL/L (ref 21–32)
CREAT SERPL-MCNC: 0.7 MG/DL (ref 0.9–1.3)
GFR AFRICAN AMERICAN: >60
GFR NON-AFRICAN AMERICAN: >60
GLOBULIN: 3 G/DL
GLUCOSE BLD-MCNC: 253 MG/DL (ref 70–99)
POTASSIUM SERPL-SCNC: 4.2 MMOL/L (ref 3.5–5.1)
SODIUM BLD-SCNC: 136 MMOL/L (ref 136–145)
TOTAL PROTEIN: 7.2 G/DL (ref 6.4–8.2)

## 2018-06-03 LAB
ESTIMATED AVERAGE GLUCOSE: 277.6 MG/DL
HBA1C MFR BLD: 11.3 %

## 2018-07-31 ENCOUNTER — OFFICE VISIT (OUTPATIENT)
Dept: FAMILY MEDICINE CLINIC | Age: 48
End: 2018-07-31

## 2018-07-31 VITALS
OXYGEN SATURATION: 95 % | HEART RATE: 81 BPM | RESPIRATION RATE: 12 BRPM | WEIGHT: 315 LBS | BODY MASS INDEX: 55.24 KG/M2 | DIASTOLIC BLOOD PRESSURE: 70 MMHG | SYSTOLIC BLOOD PRESSURE: 110 MMHG

## 2018-07-31 DIAGNOSIS — I10 ESSENTIAL HYPERTENSION: Chronic | ICD-10-CM

## 2018-07-31 DIAGNOSIS — G89.29 CHRONIC PAIN OF RIGHT ANKLE: ICD-10-CM

## 2018-07-31 DIAGNOSIS — E11.9 TYPE 2 DIABETES MELLITUS WITH HEMOGLOBIN A1C GOAL OF 7.0%-8.0% (HCC): Primary | ICD-10-CM

## 2018-07-31 DIAGNOSIS — M25.571 CHRONIC PAIN OF RIGHT ANKLE: ICD-10-CM

## 2018-07-31 DIAGNOSIS — Z11.4 ENCOUNTER FOR SCREENING FOR HIV: ICD-10-CM

## 2018-07-31 DIAGNOSIS — F43.10 POST TRAUMATIC STRESS DISORDER: Chronic | ICD-10-CM

## 2018-07-31 PROCEDURE — 99214 OFFICE O/P EST MOD 30 MIN: CPT | Performed by: FAMILY MEDICINE

## 2018-07-31 RX ORDER — AMLODIPINE BESYLATE 5 MG/1
5 TABLET ORAL DAILY
Qty: 30 TABLET | Refills: 5 | Status: SHIPPED | OUTPATIENT
Start: 2018-07-31 | End: 2019-05-06 | Stop reason: SDUPTHER

## 2018-08-02 DIAGNOSIS — E11.9 TYPE 2 DIABETES MELLITUS WITH HEMOGLOBIN A1C GOAL OF LESS THAN 7.5% (HCC): ICD-10-CM

## 2018-08-02 NOTE — PROGRESS NOTES
[facility]  Progress Note  Mikejae Latosha Mikey Crabtreealice  1970    08/02/18    Chief Complaint:   Maurice Dolan is a 51 y.o. male who is here for DM, chronic ankle pain and PTSD    HPI:   Diabetes Mellitus Type II:  Home blood sugar records: fasting range: 200 to 300 range. No significant episodes of hypoglycemia. No polyuria, polydipsia, visual changes, foot problems, GI upset. Diabetic diet compliance:  noncompliant: *,  Weight trend: stable. Current exercise: no regular exercise. Eye exam current (within one year): unknown. Lab Results   Component Value Date    LABA1C 11.3 06/02/2018     Lab Results   Component Value Date    .6 06/02/2018     Finds that when he works more than 8 hours a day, his ankle pain worsens. Finds that he gets fatigued easily when he exceeds 8 hours a day. Requests to have FMLA forms updated for the next 6 months. ROS negative for headache, vision changes, chest pain, shortness of breath, abdominal pain, urinary sx, bowel changes. Past medical, surgical, and social history reviewed. Medications and allergies reviewed. No Known Allergies  Prior to Visit Medications    Medication Sig Taking?  Authorizing Provider   amLODIPine (NORVASC) 5 MG tablet Take 1 tablet by mouth daily Yes Tor Mckenzie DO   XIGDUO XR  MG TB24 TAKE ONE TABLET BY MOUTH DAILY Yes Fredy Mckenzie DO   lisinopril (PRINIVIL;ZESTRIL) 40 MG tablet TAKE ONE TABLET BY MOUTH DAILY Yes Fredy Mckenzie DO   metFORMIN (GLUCOPHAGE) 500 MG tablet TAKE ONE TABLET BY MOUTH TWICE A DAY WITH MEALS Yes Fredy Mckenzie DO   simvastatin (ZOCOR) 20 MG tablet TAKE ONE TABLET BY MOUTH EVERY EVENING Yes Fredy Mckenzie DO   triamterene-hydrochlorothiazide (MAXZIDE-25) 37.5-25 MG per tablet TAKE ONE TABLET BY MOUTH DAILY Yes Fredy Mckenzie DO   tamsulosin (FLOMAX) 0.4 MG capsule TAKE ONE CAPSULE BY MOUTH NIGHTLY Yes Fredy Ortiz

## 2018-08-03 RX ORDER — DAPAGLIFLOZIN AND METFORMIN HYDROCHLORIDE 10; 1000 MG/1; MG/1
TABLET, FILM COATED, EXTENDED RELEASE ORAL
Qty: 30 TABLET | Refills: 2 | Status: SHIPPED | OUTPATIENT
Start: 2018-08-03 | End: 2018-11-08 | Stop reason: SDUPTHER

## 2018-08-06 ENCOUNTER — OFFICE VISIT (OUTPATIENT)
Dept: FAMILY MEDICINE CLINIC | Age: 48
End: 2018-08-06

## 2018-08-06 ENCOUNTER — TELEPHONE (OUTPATIENT)
Dept: FAMILY MEDICINE CLINIC | Age: 48
End: 2018-08-06

## 2018-08-06 VITALS
WEIGHT: 315 LBS | OXYGEN SATURATION: 95 % | SYSTOLIC BLOOD PRESSURE: 124 MMHG | BODY MASS INDEX: 55.53 KG/M2 | DIASTOLIC BLOOD PRESSURE: 54 MMHG | RESPIRATION RATE: 18 BRPM | HEART RATE: 90 BPM

## 2018-08-06 DIAGNOSIS — L98.9 BACK SKIN LESION: Primary | ICD-10-CM

## 2018-08-06 PROCEDURE — 99213 OFFICE O/P EST LOW 20 MIN: CPT | Performed by: FAMILY MEDICINE

## 2018-08-06 PROCEDURE — 11100 PR SHAV SKIN LES 11-20MM REMAINDR BODY: CPT | Performed by: FAMILY MEDICINE

## 2018-08-06 RX ORDER — SULFAMETHOXAZOLE AND TRIMETHOPRIM 800; 160 MG/1; MG/1
1 TABLET ORAL 2 TIMES DAILY
Qty: 14 TABLET | Refills: 0 | Status: SHIPPED | OUTPATIENT
Start: 2018-08-06 | End: 2018-08-13

## 2018-08-06 NOTE — TELEPHONE ENCOUNTER
Pt wife called in stating that [place] noticed this morning that his skin tag on his back has turned black and swollen and it is tender to touch.  Scheduled pt an appt with Dr. Rafia Galeana today at 450p

## 2018-09-05 RX ORDER — TAMSULOSIN HYDROCHLORIDE 0.4 MG/1
CAPSULE ORAL
Qty: 30 CAPSULE | Refills: 3 | Status: SHIPPED | OUTPATIENT
Start: 2018-09-05 | End: 2019-01-30

## 2018-10-07 DIAGNOSIS — I10 ESSENTIAL HYPERTENSION: Chronic | ICD-10-CM

## 2018-10-07 DIAGNOSIS — E11.9 TYPE 2 DIABETES, HBA1C GOAL < 8% (HCC): ICD-10-CM

## 2018-10-08 RX ORDER — LISINOPRIL 40 MG/1
TABLET ORAL
Qty: 30 TABLET | Refills: 4 | Status: SHIPPED | OUTPATIENT
Start: 2018-10-08 | End: 2019-04-02 | Stop reason: SDUPTHER

## 2018-10-08 RX ORDER — SIMVASTATIN 20 MG
TABLET ORAL
Qty: 30 TABLET | Refills: 4 | Status: SHIPPED | OUTPATIENT
Start: 2018-10-08 | End: 2019-04-30 | Stop reason: SDUPTHER

## 2018-10-08 RX ORDER — ARIPIPRAZOLE 10 MG/1
TABLET ORAL
Qty: 30 TABLET | Refills: 4 | Status: SHIPPED | OUTPATIENT
Start: 2018-10-08 | End: 2019-04-02 | Stop reason: SDUPTHER

## 2018-11-08 DIAGNOSIS — E11.9 TYPE 2 DIABETES MELLITUS WITH HEMOGLOBIN A1C GOAL OF LESS THAN 7.5% (HCC): ICD-10-CM

## 2018-11-08 DIAGNOSIS — I10 ESSENTIAL HYPERTENSION: Chronic | ICD-10-CM

## 2018-11-09 RX ORDER — CLONIDINE HYDROCHLORIDE 0.3 MG/1
TABLET ORAL
Qty: 60 TABLET | Refills: 4 | Status: SHIPPED | OUTPATIENT
Start: 2018-11-09 | End: 2019-04-30 | Stop reason: SDUPTHER

## 2018-11-09 RX ORDER — DAPAGLIFLOZIN AND METFORMIN HYDROCHLORIDE 10; 1000 MG/1; MG/1
TABLET, FILM COATED, EXTENDED RELEASE ORAL
Qty: 30 TABLET | Refills: 1 | Status: SHIPPED | OUTPATIENT
Start: 2018-11-09 | End: 2019-01-29 | Stop reason: SDUPTHER

## 2018-11-23 DIAGNOSIS — E11.9 TYPE 2 DIABETES MELLITUS WITHOUT COMPLICATION (HCC): Chronic | ICD-10-CM

## 2018-12-21 DIAGNOSIS — I10 ESSENTIAL HYPERTENSION: Chronic | ICD-10-CM

## 2018-12-22 RX ORDER — TRIAMTERENE AND HYDROCHLOROTHIAZIDE 37.5; 25 MG/1; MG/1
TABLET ORAL
Qty: 30 TABLET | Refills: 0 | Status: SHIPPED | OUTPATIENT
Start: 2018-12-22 | End: 2019-01-30

## 2019-01-14 ENCOUNTER — OFFICE VISIT (OUTPATIENT)
Dept: FAMILY MEDICINE CLINIC | Age: 49
End: 2019-01-14
Payer: COMMERCIAL

## 2019-01-14 VITALS
BODY MASS INDEX: 53.78 KG/M2 | RESPIRATION RATE: 12 BRPM | OXYGEN SATURATION: 97 % | WEIGHT: 315 LBS | SYSTOLIC BLOOD PRESSURE: 138 MMHG | TEMPERATURE: 98.5 F | HEART RATE: 91 BPM | DIASTOLIC BLOOD PRESSURE: 78 MMHG

## 2019-01-14 DIAGNOSIS — M54.16 RIGHT LUMBAR RADICULOPATHY: ICD-10-CM

## 2019-01-14 DIAGNOSIS — E11.9 TYPE 2 DIABETES MELLITUS WITH HEMOGLOBIN A1C GOAL OF 7.0%-8.0% (HCC): Primary | ICD-10-CM

## 2019-01-14 DIAGNOSIS — E66.01 MORBID OBESITY DUE TO EXCESS CALORIES (HCC): ICD-10-CM

## 2019-01-14 PROCEDURE — 99214 OFFICE O/P EST MOD 30 MIN: CPT | Performed by: FAMILY MEDICINE

## 2019-01-24 ENCOUNTER — TELEPHONE (OUTPATIENT)
Dept: FAMILY MEDICINE CLINIC | Age: 49
End: 2019-01-24

## 2019-01-26 DIAGNOSIS — E11.9 TYPE 2 DIABETES MELLITUS WITH HEMOGLOBIN A1C GOAL OF 7.0%-8.0% (HCC): ICD-10-CM

## 2019-01-26 DIAGNOSIS — Z11.4 ENCOUNTER FOR SCREENING FOR HIV: ICD-10-CM

## 2019-01-26 LAB
A/G RATIO: 1.5 (ref 1.1–2.2)
ALBUMIN SERPL-MCNC: 4.2 G/DL (ref 3.4–5)
ALP BLD-CCNC: 65 U/L (ref 40–129)
ALT SERPL-CCNC: 83 U/L (ref 10–40)
ANION GAP SERPL CALCULATED.3IONS-SCNC: 16 MMOL/L (ref 3–16)
AST SERPL-CCNC: 35 U/L (ref 15–37)
BILIRUB SERPL-MCNC: 0.3 MG/DL (ref 0–1)
BUN BLDV-MCNC: 21 MG/DL (ref 7–20)
CALCIUM SERPL-MCNC: 9.2 MG/DL (ref 8.3–10.6)
CHLORIDE BLD-SCNC: 102 MMOL/L (ref 99–110)
CHOLESTEROL, TOTAL: 216 MG/DL (ref 0–199)
CO2: 23 MMOL/L (ref 21–32)
CREAT SERPL-MCNC: 0.6 MG/DL (ref 0.9–1.3)
GFR AFRICAN AMERICAN: >60
GFR NON-AFRICAN AMERICAN: >60
GLOBULIN: 2.8 G/DL
GLUCOSE BLD-MCNC: 205 MG/DL (ref 70–99)
HDLC SERPL-MCNC: 57 MG/DL (ref 40–60)
LDL CHOLESTEROL CALCULATED: 137 MG/DL
POTASSIUM SERPL-SCNC: 4.7 MMOL/L (ref 3.5–5.1)
SODIUM BLD-SCNC: 141 MMOL/L (ref 136–145)
TOTAL PROTEIN: 7 G/DL (ref 6.4–8.2)
TRIGL SERPL-MCNC: 109 MG/DL (ref 0–150)
VLDLC SERPL CALC-MCNC: 22 MG/DL

## 2019-01-27 LAB
ESTIMATED AVERAGE GLUCOSE: 254.7 MG/DL
HBA1C MFR BLD: 10.5 %
HIV AG/AB: NORMAL
HIV ANTIGEN: NORMAL
HIV-1 ANTIBODY: NORMAL
HIV-2 AB: NORMAL

## 2019-01-29 ENCOUNTER — TELEPHONE (OUTPATIENT)
Dept: FAMILY MEDICINE CLINIC | Age: 49
End: 2019-01-29

## 2019-01-29 DIAGNOSIS — E11.9 TYPE 2 DIABETES MELLITUS WITH HEMOGLOBIN A1C GOAL OF LESS THAN 7.5% (HCC): ICD-10-CM

## 2019-01-30 DIAGNOSIS — E11.9 TYPE 2 DIABETES MELLITUS WITH HEMOGLOBIN A1C GOAL OF LESS THAN 7.5% (HCC): ICD-10-CM

## 2019-01-30 DIAGNOSIS — I10 ESSENTIAL HYPERTENSION: Chronic | ICD-10-CM

## 2019-01-30 RX ORDER — TRIAMTERENE AND HYDROCHLOROTHIAZIDE 37.5; 25 MG/1; MG/1
TABLET ORAL
Qty: 30 TABLET | Refills: 4 | Status: SHIPPED | OUTPATIENT
Start: 2019-01-30 | End: 2019-07-05 | Stop reason: SDUPTHER

## 2019-01-30 RX ORDER — DAPAGLIFLOZIN AND METFORMIN HYDROCHLORIDE 10; 1000 MG/1; MG/1
TABLET, FILM COATED, EXTENDED RELEASE ORAL
Qty: 30 TABLET | Refills: 1 | OUTPATIENT
Start: 2019-01-30

## 2019-01-30 RX ORDER — TAMSULOSIN HYDROCHLORIDE 0.4 MG/1
CAPSULE ORAL
Qty: 30 CAPSULE | Refills: 3 | Status: SHIPPED | OUTPATIENT
Start: 2019-01-30 | End: 2019-06-01 | Stop reason: SDUPTHER

## 2019-02-28 DIAGNOSIS — E11.9 TYPE 2 DIABETES MELLITUS WITHOUT COMPLICATION (HCC): Chronic | ICD-10-CM

## 2019-03-29 DIAGNOSIS — E11.9 TYPE 2 DIABETES MELLITUS WITH HEMOGLOBIN A1C GOAL OF LESS THAN 7.5% (HCC): Primary | ICD-10-CM

## 2019-03-29 NOTE — TELEPHONE ENCOUNTER
Pt is experiencing muscle weakness and fatigue. Pts private area is red and very itchy. Wife stated these symptoms have been going onat least 2 months since last appt. Pts wife stated she found that these are all side effects from Dapagliflozin-Metformin HCl ER (XIGDUO XR)  MG TB24 and pt would like to be taken off medication and put on another.  Please call back at  [phone]

## 2019-04-01 NOTE — TELEPHONE ENCOUNTER
I d/guy Belkis Robles from med list. This is a common side effect unfortatntely. Needs to get on replacement daily pill for diabetes. I sent rx for Janumet to take once a day at [facility]. Well tolerated medication.

## 2019-04-02 DIAGNOSIS — E11.9 TYPE 2 DIABETES MELLITUS WITH HEMOGLOBIN A1C GOAL OF LESS THAN 7.5% (HCC): ICD-10-CM

## 2019-04-02 DIAGNOSIS — I10 ESSENTIAL HYPERTENSION: Chronic | ICD-10-CM

## 2019-04-02 DIAGNOSIS — E11.9 TYPE 2 DIABETES, HBA1C GOAL < 8% (HCC): ICD-10-CM

## 2019-04-04 RX ORDER — ARIPIPRAZOLE 10 MG/1
TABLET ORAL
Qty: 30 TABLET | Refills: 2 | Status: SHIPPED | OUTPATIENT
Start: 2019-04-04 | End: 2019-08-13 | Stop reason: SDUPTHER

## 2019-04-04 RX ORDER — LISINOPRIL 40 MG/1
TABLET ORAL
Qty: 30 TABLET | Refills: 2 | Status: SHIPPED | OUTPATIENT
Start: 2019-04-04 | End: 2019-07-18 | Stop reason: SDUPTHER

## 2019-04-30 DIAGNOSIS — I10 ESSENTIAL HYPERTENSION: Chronic | ICD-10-CM

## 2019-04-30 RX ORDER — CLONIDINE HYDROCHLORIDE 0.3 MG/1
TABLET ORAL
Qty: 60 TABLET | Refills: 3 | Status: SHIPPED | OUTPATIENT
Start: 2019-04-30 | End: 2019-08-19 | Stop reason: SDUPTHER

## 2019-04-30 RX ORDER — SIMVASTATIN 20 MG
TABLET ORAL
Qty: 30 TABLET | Refills: 3 | Status: SHIPPED | OUTPATIENT
Start: 2019-04-30 | End: 2019-08-19 | Stop reason: SDUPTHER

## 2019-05-02 DIAGNOSIS — E11.9 TYPE 2 DIABETES MELLITUS WITHOUT COMPLICATION (HCC): Chronic | ICD-10-CM

## 2019-05-06 DIAGNOSIS — I10 ESSENTIAL HYPERTENSION: Chronic | ICD-10-CM

## 2019-05-07 RX ORDER — AMLODIPINE BESYLATE 5 MG/1
TABLET ORAL
Qty: 30 TABLET | Refills: 4 | Status: SHIPPED | OUTPATIENT
Start: 2019-05-07 | End: 2019-08-19 | Stop reason: SDUPTHER

## 2019-06-01 DIAGNOSIS — E11.9 TYPE 2 DIABETES MELLITUS WITHOUT COMPLICATION (HCC): Chronic | ICD-10-CM

## 2019-06-03 RX ORDER — TAMSULOSIN HYDROCHLORIDE 0.4 MG/1
CAPSULE ORAL
Qty: 30 CAPSULE | Refills: 2 | Status: SHIPPED | OUTPATIENT
Start: 2019-06-03 | End: 2019-08-19 | Stop reason: SDUPTHER

## 2019-07-05 DIAGNOSIS — E11.9 TYPE 2 DIABETES MELLITUS WITHOUT COMPLICATION (HCC): Chronic | ICD-10-CM

## 2019-07-05 DIAGNOSIS — I10 ESSENTIAL HYPERTENSION: Chronic | ICD-10-CM

## 2019-07-05 RX ORDER — TRIAMTERENE AND HYDROCHLOROTHIAZIDE 37.5; 25 MG/1; MG/1
TABLET ORAL
Qty: 30 TABLET | Refills: 0 | Status: SHIPPED | OUTPATIENT
Start: 2019-07-05 | End: 2019-08-13 | Stop reason: SDUPTHER

## 2019-07-18 DIAGNOSIS — E11.9 TYPE 2 DIABETES, HBA1C GOAL < 8% (HCC): ICD-10-CM

## 2019-07-18 DIAGNOSIS — I10 ESSENTIAL HYPERTENSION: Chronic | ICD-10-CM

## 2019-07-18 RX ORDER — LISINOPRIL 40 MG/1
TABLET ORAL
Qty: 90 TABLET | Refills: 1 | Status: SHIPPED | OUTPATIENT
Start: 2019-07-18 | End: 2020-01-29

## 2019-08-08 ENCOUNTER — TELEPHONE (OUTPATIENT)
Dept: FAMILY MEDICINE CLINIC | Age: 49
End: 2019-08-08

## 2019-08-09 ENCOUNTER — TELEPHONE (OUTPATIENT)
Dept: FAMILY MEDICINE CLINIC | Age: 49
End: 2019-08-09

## 2019-08-13 DIAGNOSIS — I10 ESSENTIAL HYPERTENSION: Chronic | ICD-10-CM

## 2019-08-13 DIAGNOSIS — E11.9 TYPE 2 DIABETES MELLITUS WITHOUT COMPLICATION (HCC): Chronic | ICD-10-CM

## 2019-08-13 RX ORDER — TRIAMTERENE AND HYDROCHLOROTHIAZIDE 37.5; 25 MG/1; MG/1
TABLET ORAL
Qty: 30 TABLET | Refills: 1 | Status: SHIPPED | OUTPATIENT
Start: 2019-08-13 | End: 2019-08-19 | Stop reason: SDUPTHER

## 2019-08-13 RX ORDER — ARIPIPRAZOLE 10 MG/1
TABLET ORAL
Qty: 30 TABLET | Refills: 1 | Status: SHIPPED | OUTPATIENT
Start: 2019-08-13 | End: 2019-08-19 | Stop reason: SDUPTHER

## 2019-08-19 ENCOUNTER — OFFICE VISIT (OUTPATIENT)
Dept: FAMILY MEDICINE CLINIC | Age: 49
End: 2019-08-19
Payer: COMMERCIAL

## 2019-08-19 VITALS
DIASTOLIC BLOOD PRESSURE: 75 MMHG | OXYGEN SATURATION: 97 % | TEMPERATURE: 98.2 F | BODY MASS INDEX: 44.1 KG/M2 | SYSTOLIC BLOOD PRESSURE: 126 MMHG | HEART RATE: 91 BPM | WEIGHT: 315 LBS | HEIGHT: 71 IN

## 2019-08-19 DIAGNOSIS — F43.10 POST TRAUMATIC STRESS DISORDER: ICD-10-CM

## 2019-08-19 DIAGNOSIS — I10 ESSENTIAL HYPERTENSION: Chronic | ICD-10-CM

## 2019-08-19 DIAGNOSIS — F39 MOOD DISORDER (HCC): ICD-10-CM

## 2019-08-19 DIAGNOSIS — E11.9 TYPE 2 DIABETES MELLITUS WITHOUT COMPLICATION, UNSPECIFIED WHETHER LONG TERM INSULIN USE (HCC): Primary | ICD-10-CM

## 2019-08-19 PROCEDURE — 99214 OFFICE O/P EST MOD 30 MIN: CPT | Performed by: FAMILY MEDICINE

## 2019-08-19 RX ORDER — TAMSULOSIN HYDROCHLORIDE 0.4 MG/1
CAPSULE ORAL
Qty: 30 CAPSULE | Refills: 5 | Status: SHIPPED | OUTPATIENT
Start: 2019-08-19 | End: 2020-02-12 | Stop reason: SDUPTHER

## 2019-08-19 RX ORDER — SIMVASTATIN 20 MG
TABLET ORAL
Qty: 30 TABLET | Refills: 5 | Status: SHIPPED | OUTPATIENT
Start: 2019-08-19 | End: 2020-02-12 | Stop reason: SDUPTHER

## 2019-08-19 RX ORDER — AMLODIPINE BESYLATE 5 MG/1
TABLET ORAL
Qty: 30 TABLET | Refills: 5 | Status: SHIPPED | OUTPATIENT
Start: 2019-08-19 | End: 2020-02-12 | Stop reason: SDUPTHER

## 2019-08-19 RX ORDER — ARIPIPRAZOLE 10 MG/1
TABLET ORAL
Qty: 30 TABLET | Refills: 5 | Status: SHIPPED | OUTPATIENT
Start: 2019-08-19 | End: 2020-02-12 | Stop reason: SDUPTHER

## 2019-08-19 RX ORDER — CLONIDINE HYDROCHLORIDE 0.3 MG/1
TABLET ORAL
Qty: 60 TABLET | Refills: 5 | Status: SHIPPED | OUTPATIENT
Start: 2019-08-19 | End: 2020-02-12 | Stop reason: SDUPTHER

## 2019-08-19 RX ORDER — TRIAMTERENE AND HYDROCHLOROTHIAZIDE 37.5; 25 MG/1; MG/1
TABLET ORAL
Qty: 30 TABLET | Refills: 5 | Status: SHIPPED | OUTPATIENT
Start: 2019-08-19 | End: 2020-02-12 | Stop reason: SDUPTHER

## 2019-08-19 NOTE — PROGRESS NOTES
[facility]  Progress Note  Roopayancy Bajwa OklaL.V. Stabler Memorial Hospitaleverardo Crabtreealice  1970    08/19/19    Chief Complaint:   Bel Ge is a 47 y.o. male who is here for DM and PTSD    HPI:   Diabetes Mellitus Type II:  Home blood sugar records: patient does not test.  No significant episodes of hypoglycemia. No polyuria, polydipsia, visual changes, foot problems, GI upset. Diabetic diet compliance:  compliant most of the time,  Weight trend: decreasing steadily. Current exercise: walking. Eye exam current (within one year): unknown. Lab Results   Component Value Date    LABA1C 10.5 01/26/2019    LABA1C 11.3 06/02/2018    LABA1C 11.7 02/17/2018     Lab Results   Component Value Date    LABMICR <1.20 11/13/2017    LDLCALC 137 (H) 01/26/2019    CREATININE 0.6 (L) 01/26/2019     Finds that anxiety attacks are less. Was about to go to ER on 2 occasions for chest pain but his breathing exercises helped. Abilify is helpful to him and he finds he does not need the fluoxetine nor clonazepam at this time. Thinks he is doing well without regular psychiatry appt. Still finds he needs certain time off work. Asks for me to complete the [facility] paperwork. ROS negative for headache, vision changes, chest pain, shortness of breath, abdominal pain, urinary sx, bowel changes. Past medical, surgical, and social history reviewed. Medications and allergies reviewed. No Known Allergies  Prior to Visit Medications    Medication Sig Taking?  Authorizing Provider   metFORMIN (GLUCOPHAGE) 500 MG tablet TAKE ONE TABLET BY MOUTH TWICE A DAY WITH MEALS Yes Chiki Mckenzie, DO   triamterene-hydrochlorothiazide (MAXZIDE-25) 37.5-25 MG per tablet TAKE ONE TABLET BY MOUTH DAILY Yes Chiki Mckenzie DO   ARIPiprazole (ABILIFY) 10 MG tablet TAKE ONE TABLET BY MOUTH DAILY Yes Chiki Mckenzie DO   tamsulosin (FLOMAX) 0.4 MG capsule TAKE ONE CAPSULE BY MOUTH ONCE NIGHTLY Yes Effie Armendariz, DO

## 2019-08-26 DIAGNOSIS — E11.9 TYPE 2 DIABETES MELLITUS WITHOUT COMPLICATION, UNSPECIFIED WHETHER LONG TERM INSULIN USE (HCC): ICD-10-CM

## 2019-08-26 LAB
A/G RATIO: 1.4 (ref 1.1–2.2)
ALBUMIN SERPL-MCNC: 4.2 G/DL (ref 3.4–5)
ALP BLD-CCNC: 72 U/L (ref 40–129)
ALT SERPL-CCNC: 103 U/L (ref 10–40)
ANION GAP SERPL CALCULATED.3IONS-SCNC: 12 MMOL/L (ref 3–16)
AST SERPL-CCNC: 38 U/L (ref 15–37)
BILIRUB SERPL-MCNC: 0.3 MG/DL (ref 0–1)
BUN BLDV-MCNC: 25 MG/DL (ref 7–20)
CALCIUM SERPL-MCNC: 9.8 MG/DL (ref 8.3–10.6)
CHLORIDE BLD-SCNC: 98 MMOL/L (ref 99–110)
CHOLESTEROL, TOTAL: 167 MG/DL (ref 0–199)
CO2: 27 MMOL/L (ref 21–32)
CREAT SERPL-MCNC: 0.8 MG/DL (ref 0.9–1.3)
CREATININE URINE: 85.3 MG/DL (ref 39–259)
GFR AFRICAN AMERICAN: >60
GFR NON-AFRICAN AMERICAN: >60
GLOBULIN: 3.1 G/DL
GLUCOSE BLD-MCNC: 365 MG/DL (ref 70–99)
HDLC SERPL-MCNC: 64 MG/DL (ref 40–60)
LDL CHOLESTEROL CALCULATED: 85 MG/DL
MICROALBUMIN UR-MCNC: <1.2 MG/DL
MICROALBUMIN/CREAT UR-RTO: NORMAL MG/G (ref 0–30)
POTASSIUM SERPL-SCNC: 4.8 MMOL/L (ref 3.5–5.1)
SODIUM BLD-SCNC: 137 MMOL/L (ref 136–145)
TOTAL PROTEIN: 7.3 G/DL (ref 6.4–8.2)
TRIGL SERPL-MCNC: 88 MG/DL (ref 0–150)
VLDLC SERPL CALC-MCNC: 18 MG/DL

## 2019-08-27 LAB
ESTIMATED AVERAGE GLUCOSE: 286.2 MG/DL
HBA1C MFR BLD: 11.6 %

## 2019-09-12 ENCOUNTER — OFFICE VISIT (OUTPATIENT)
Dept: FAMILY MEDICINE CLINIC | Age: 49
End: 2019-09-12
Payer: COMMERCIAL

## 2019-09-12 VITALS
HEART RATE: 108 BPM | SYSTOLIC BLOOD PRESSURE: 130 MMHG | WEIGHT: 315 LBS | TEMPERATURE: 98 F | DIASTOLIC BLOOD PRESSURE: 80 MMHG | OXYGEN SATURATION: 95 % | HEIGHT: 71 IN | BODY MASS INDEX: 44.1 KG/M2

## 2019-09-12 DIAGNOSIS — Z23 NEED FOR INFLUENZA VACCINATION: ICD-10-CM

## 2019-09-12 DIAGNOSIS — E11.9 DM TYPE 2, GOAL HBA1C < 9% (HCC): Primary | ICD-10-CM

## 2019-09-12 DIAGNOSIS — L73.9 FOLLICULITIS: ICD-10-CM

## 2019-09-12 PROCEDURE — 99214 OFFICE O/P EST MOD 30 MIN: CPT | Performed by: FAMILY MEDICINE

## 2019-09-12 PROCEDURE — 90471 IMMUNIZATION ADMIN: CPT | Performed by: FAMILY MEDICINE

## 2019-09-12 PROCEDURE — 90688 IIV4 VACCINE SPLT 0.5 ML IM: CPT | Performed by: FAMILY MEDICINE

## 2019-09-12 RX ORDER — SULFAMETHOXAZOLE AND TRIMETHOPRIM 800; 160 MG/1; MG/1
1 TABLET ORAL 2 TIMES DAILY
Qty: 20 TABLET | Refills: 0 | Status: SHIPPED | OUTPATIENT
Start: 2019-09-12 | End: 2019-09-22

## 2019-09-12 NOTE — PATIENT INSTRUCTIONS
1) Take the antibiotic pill and ointment for the skin sores. 2) Change the strength of metformin. Not it is 1000 mg morning and again in the evening. 3) Recheck labwork in 8 weeks and no later than 12 weeks.

## 2019-09-12 NOTE — PROGRESS NOTES
[facility]  Progress Note  Praveen Parson Mikey Vasquez  1970    09/12/19    Chief Complaint:   Alek Dixon is a 47 y.o. male who is here for DM    HPI:   Diabetes Mellitus Type II:  Home blood sugar records: patient does not test.  No significant episodes of hypoglycemia. No polyuria, polydipsia, visual changes, foot problems, GI upset. Diabetic diet compliance:  noncompliant: *,  Weight trend: decreasing steadily. Current exercise: none. Eye exam current (within one year): unknown. Has occasional skin sores. No fevers. No sores. ROS negative for headache, vision changes, chest pain, shortness of breath, abdominal pain, urinary sx, bowel changes. Past medical, surgical, and social history reviewed. Medications and allergies reviewed. No Known Allergies  Prior to Visit Medications    Medication Sig Taking? Authorizing Provider   mupirocin (BACTROBAN) 2 % ointment Apply 3 times daily.  Yes Marley Mckenzie DO   sulfamethoxazole-trimethoprim (BACTRIM DS;SEPTRA DS) 800-160 MG per tablet Take 1 tablet by mouth 2 times daily for 10 days Yes Bharath Vance,    metFORMIN (GLUCOPHAGE) 1000 MG tablet Take 1 tablet by mouth 2 times daily (with meals) Yes Marley Mckenzie DO   triamterene-hydrochlorothiazide (MAXZIDE-25) 37.5-25 MG per tablet TAKE ONE TABLET BY MOUTH DAILY Yes Marley Mckenzie DO   ARIPiprazole (ABILIFY) 10 MG tablet TAKE ONE TABLET BY MOUTH DAILY Yes Marley Mckenzie DO   tamsulosin (FLOMAX) 0.4 MG capsule TAKE ONE CAPSULE BY MOUTH ONCE NIGHTLY Yes Tor Mckenzie DO   amLODIPine (NORVASC) 5 MG tablet TAKE ONE TABLET BY MOUTH DAILY Yes Marley Mckenzie DO   cloNIDine (CATAPRES) 0.3 MG tablet TAKE ONE TABLET BY MOUTH TWICE A DAY Yes Tor Mckenzie DO   simvastatin (ZOCOR) 20 MG tablet TAKE ONE TABLET BY MOUTH EVERY EVENING Yes Marley Mckenzie DO   lisinopril (PRINIVIL;ZESTRIL) 40 MG Packs/day: 0.25     Years: 5.00     Pack years: 1.25    Smokeless tobacco: Never Used    Tobacco comment: quit smoking about 2004   Substance and Sexual Activity    Alcohol use: No    Drug use: No    Sexual activity: Yes     Partners: Female     Comment:  - [address] Physical activity:     Days per week: Not on file     Minutes per session: Not on file    Stress: Not on file   Relationships    Social connections:     Talks on phone: Not on file     Gets together: Not on file     Attends Baptism service: Not on file     Active member of club or organization: Not on file     Attends meetings of clubs or organizations: Not on file     Relationship status: Not on file    Intimate partner violence:     Fear of current or ex partner: Not on file     Emotionally abused: Not on file     Physically abused: Not on file     Forced sexual activity: Not on file   Other Topics Concern    Not on file   Social History Narrative    Not on file       O: /80 (Site: Left Upper Arm, Position: Sitting, Cuff Size: Large Adult)   Pulse 108   Temp 98 °F (36.7 °C) (Oral)   Ht 5' 11\" (1.803 m)   Wt (!) 361 lb (163.7 kg)   SpO2 95%   BMI 50.35 kg/m²   Physical Exam  GEN: No acute distress, cooperative, well nourished, alert. HEENT: PEERLA, EOMI , normocephalic/atraumatic, nares and oropharynx clear. Mucus membranes normal, Tympanic membranes clear bilaterally. Neck: soft, supple, no thyromegaly, mass, no Lymphadenopathy  CV: Regular rate and rhythm, no murmur, rubs, gallops. No edema. Resp: Clear to auscultation bilaterally good air entry bilaterally  No crackles, wheeze. Breathing comfortably. Psych: normal affect. Neuro: AOx3  Skin: 2 infected follicles on thighs. ASSESSMENT   Diagnosis Orders   1. DM type 2, goal HbA1c < 9% ([facility])  metFORMIN (GLUCOPHAGE) 1000 MG tablet    Hemoglobin A1C    COMPREHENSIVE METABOLIC PANEL   2.  Need for influenza vaccination  Kriss Pinon

## 2019-12-13 DIAGNOSIS — E11.9 DM TYPE 2, GOAL HBA1C < 9% (HCC): ICD-10-CM

## 2019-12-13 LAB
A/G RATIO: 1.4 (ref 1.1–2.2)
ALBUMIN SERPL-MCNC: 3.8 G/DL (ref 3.4–5)
ALP BLD-CCNC: 64 U/L (ref 40–129)
ALT SERPL-CCNC: 87 U/L (ref 10–40)
ANION GAP SERPL CALCULATED.3IONS-SCNC: 15 MMOL/L (ref 3–16)
AST SERPL-CCNC: 47 U/L (ref 15–37)
BILIRUB SERPL-MCNC: 0.3 MG/DL (ref 0–1)
BUN BLDV-MCNC: 17 MG/DL (ref 7–20)
CALCIUM SERPL-MCNC: 9.1 MG/DL (ref 8.3–10.6)
CHLORIDE BLD-SCNC: 99 MMOL/L (ref 99–110)
CO2: 26 MMOL/L (ref 21–32)
CREAT SERPL-MCNC: 0.7 MG/DL (ref 0.9–1.3)
GFR AFRICAN AMERICAN: >60
GFR NON-AFRICAN AMERICAN: >60
GLOBULIN: 2.8 G/DL
GLUCOSE BLD-MCNC: 321 MG/DL (ref 70–99)
POTASSIUM SERPL-SCNC: 4.4 MMOL/L (ref 3.5–5.1)
SODIUM BLD-SCNC: 140 MMOL/L (ref 136–145)
TOTAL PROTEIN: 6.6 G/DL (ref 6.4–8.2)

## 2019-12-14 LAB
ESTIMATED AVERAGE GLUCOSE: 312.1 MG/DL
HBA1C MFR BLD: 12.5 %

## 2019-12-15 DIAGNOSIS — E11.9 TYPE 2 DIABETES, HBA1C GOAL < 8% (HCC): Primary | ICD-10-CM

## 2020-01-30 RX ORDER — LISINOPRIL 40 MG/1
TABLET ORAL
Qty: 30 TABLET | Refills: 5 | Status: SHIPPED | OUTPATIENT
Start: 2020-01-30 | End: 2020-07-22

## 2020-02-07 ENCOUNTER — TELEPHONE (OUTPATIENT)
Dept: FAMILY MEDICINE CLINIC | Age: 50
End: 2020-02-07

## 2020-02-12 ENCOUNTER — OFFICE VISIT (OUTPATIENT)
Dept: FAMILY MEDICINE CLINIC | Age: 50
End: 2020-02-12
Payer: COMMERCIAL

## 2020-02-12 VITALS
DIASTOLIC BLOOD PRESSURE: 74 MMHG | WEIGHT: 315 LBS | BODY MASS INDEX: 51.05 KG/M2 | TEMPERATURE: 97.3 F | OXYGEN SATURATION: 95 % | SYSTOLIC BLOOD PRESSURE: 142 MMHG | RESPIRATION RATE: 16 BRPM | HEART RATE: 90 BPM

## 2020-02-12 PROCEDURE — 99214 OFFICE O/P EST MOD 30 MIN: CPT | Performed by: FAMILY MEDICINE

## 2020-02-12 RX ORDER — TRIAMTERENE AND HYDROCHLOROTHIAZIDE 37.5; 25 MG/1; MG/1
TABLET ORAL
Qty: 30 TABLET | Refills: 5 | Status: SHIPPED | OUTPATIENT
Start: 2020-02-12 | End: 2020-10-30 | Stop reason: SDUPTHER

## 2020-02-12 RX ORDER — ARIPIPRAZOLE 10 MG/1
TABLET ORAL
Qty: 30 TABLET | Refills: 5 | Status: SHIPPED | OUTPATIENT
Start: 2020-02-12 | End: 2020-10-30 | Stop reason: SDUPTHER

## 2020-02-12 RX ORDER — CLONIDINE HYDROCHLORIDE 0.3 MG/1
TABLET ORAL
Qty: 60 TABLET | Refills: 5 | Status: SHIPPED | OUTPATIENT
Start: 2020-02-12 | End: 2020-10-30 | Stop reason: SDUPTHER

## 2020-02-12 RX ORDER — AMLODIPINE BESYLATE 5 MG/1
TABLET ORAL
Qty: 30 TABLET | Refills: 5 | Status: SHIPPED | OUTPATIENT
Start: 2020-02-12 | End: 2020-10-30 | Stop reason: SDUPTHER

## 2020-02-12 RX ORDER — TAMSULOSIN HYDROCHLORIDE 0.4 MG/1
CAPSULE ORAL
Qty: 30 CAPSULE | Refills: 5 | Status: SHIPPED | OUTPATIENT
Start: 2020-02-12 | End: 2020-10-30 | Stop reason: SDUPTHER

## 2020-02-12 RX ORDER — SIMVASTATIN 20 MG
TABLET ORAL
Qty: 30 TABLET | Refills: 5 | Status: SHIPPED | OUTPATIENT
Start: 2020-02-12 | End: 2020-10-30 | Stop reason: SDUPTHER

## 2020-02-12 NOTE — PATIENT INSTRUCTIONS
1) Get your labwork done in 3 months (May 2020). 2) Take the stronger empagliflozin-metformin (). Stop the 12.5-1000. 3) Follow-up in clinic a few days after you bloodwork is done.

## 2020-02-12 NOTE — PROGRESS NOTES
[facility]  Progress Note  [facility], [address] [place] [address]          Ovi Vasquez  1970    02/16/20    Chief Complaint:   Rogelio Rico is a 47 y.o. male who is here For diabetes and FMLA paperwork      HPI:   Patient continues to take the M empagliflozin metformin 12.5 mg 1000 mg. He also continues to experience anxiety attacks and finds that he needs to leave his workplace at times suddenly. He has a history of PTSD. He has had FMLA for intermittent leave and he requests to continue on the [facility] for additional 6 months      ROS negative for headache, vision changes, chest pain, shortness of breath, abdominal pain, urinary sx, bowel changes. Past medical, surgical, and social history reviewed. Medications and allergies reviewed. No Known Allergies  Prior to Visit Medications    Medication Sig Taking? Authorizing Provider   triamterene-hydrochlorothiazide (MAXZIDE-25) 37.5-25 MG per tablet TAKE ONE TABLET BY MOUTH DAILY Yes Mary Mckenzie DO   ARIPiprazole (ABILIFY) 10 MG tablet TAKE ONE TABLET BY MOUTH DAILY Yes Mary Mckenzie DO   tamsulosin (FLOMAX) 0.4 MG capsule TAKE ONE CAPSULE BY MOUTH ONCE NIGHTLY Yes Tor Mckenzie DO   amLODIPine (NORVASC) 5 MG tablet TAKE ONE TABLET BY MOUTH DAILY Yes Tor Mckenzie DO   simvastatin (ZOCOR) 20 MG tablet TAKE ONE TABLET BY MOUTH EVERY EVENING Yes Tor Mckenzie DO   cloNIDine (CATAPRES) 0.3 MG tablet TAKE ONE TABLET BY MOUTH TWICE A DAY Yes Tor Mckenzie DO   Empagliflozin-metFORMIN HCl ER  MG TB24 Take 1 tablet po daily.  Yes Mary Mckenzie DO   lisinopril (PRINIVIL;ZESTRIL) 40 MG tablet TAKE ONE TABLET BY MOUTH DAILY Yes Mary Mckenzie DO   Empagliflozin-metFORMIN HCl 12.5-1000 MG TABS Take 1 tablet by mouth 2 times daily Yes Mary Mckenzie DO   COCONUT OIL PO Take by mouth Yes Historical Provider, MD   Cinnamon 500 MG CAPS Take by mouth Yes Historical Provider, MD [facility] OMEGA-3 KRILL OIL PO Take by mouth Yes Historical Provider, MD   aspirin EC 81 MG EC tablet Take 81 mg by mouth daily.  Yes Historical Provider, MD          Vitals:    02/12/20 0933 02/12/20 0948   BP: (!) 141/87 (!) 142/74   Pulse: 90    Resp: 16    Temp: 97.3 °F (36.3 °C)    TempSrc: Oral    SpO2: 95%    Weight: (!) 366 lb (166 kg)       Wt Readings from Last 3 Encounters:   02/12/20 (!) 366 lb (166 kg)   09/12/19 (!) 361 lb (163.7 kg)   08/19/19 (!) 366 lb 12.8 oz (166.4 kg)     BP Readings from Last 3 Encounters:   02/12/20 (!) 142/74   09/12/19 130/80   08/19/19 126/75       Patient Active Problem List   Diagnosis    Hypertension    Depressive disorder, not elsewhere classified    Right leg pain    Eczema    Right lumbar radiculopathy    Urinary urgency    Obstructive sleep apnea    Memory loss    Abdominal mass    Diabetes mellitus, type 2 ([facility])    Abdominal wall hernia    Hyperlipidemia    Chest pain    Left ventricular hypertrophy    Left atrial dilation    Right atrial dilation    Right ventricular dilation    Syncope    Family history of premature coronary artery disease    Post traumatic stress disorder    Claustrophobia    Anemia    Right foot pain    Left foot pain    Right bundle branch block    Right ankle pain    BMI 50.0-59.9, adult ([facility])    Type 2 diabetes mellitus with hemoglobin A1c goal of 7.0%-8.0% ([facility])    Ankle arthritis    Congenital tarsal coalition    Osteoarthritis of subtalar joint    Osteoarthritis of right subtalar joint    Tarsal coalitions    Ankle abrasion with infection    Wound infection after surgery    Type 2 diabetes mellitus without complication ([facility] [address]    Obesity    Insomnia due to mental disorder       Immunization History   Administered Date(s) Administered    Influenza Virus Vaccine 03/24/2016    Influenza, Haydee Llanos, IM, (6 mo and older Fluzone, Flulaval, Fluarix and 3 yrs and older Afluria) 09/12/2019       Past Medical History: Comment:  - [address] Physical activity:     Days per week: Not on file     Minutes per session: Not on file    Stress: Not on file   Relationships    Social connections:     Talks on phone: Not on file     Gets together: Not on file     Attends Mandaen service: Not on file     Active member of club or organization: Not on file     Attends meetings of clubs or organizations: Not on file     Relationship status: Not on file    Intimate partner violence:     Fear of current or ex partner: Not on file     Emotionally abused: Not on file     Physically abused: Not on file     Forced sexual activity: Not on file   Other Topics Concern    Not on file   Social History Narrative    Not on file       O: BP (!) 142/74   Pulse 90   Temp 97.3 °F (36.3 °C) (Oral)   Resp 16   Wt (!) 366 lb (166 kg)   SpO2 95%   BMI 51.05 kg/m²   Physical Exam  GEN: No acute distress, cooperative, well nourished, alert. HEENT: PEERLA, EOMI , normocephalic/atraumatic, nares and oropharynx clear. Mucus membranes normal, Tympanic membranes clear bilaterally. Neck: soft, supple, no thyromegaly, mass, no Lymphadenopathy  CV: Regular rate and rhythm, no murmur, rubs, gallops. No edema. Resp: Clear to auscultation bilaterally good air entry bilaterally  No crackles, wheeze. Breathing comfortably. Psych: normal affect. Neuro: AOx3  No pertinent positive      ASSESSMENT   Diagnosis Orders   1. Essential hypertension  triamterene-hydrochlorothiazide (MAXZIDE-25) 37.5-25 MG per tablet    amLODIPine (NORVASC) 5 MG tablet    cloNIDine (CATAPRES) 0.3 MG tablet   2. Post traumatic stress disorder  ARIPiprazole (ABILIFY) 10 MG tablet   3. Mood disorder (HCC)  ARIPiprazole (ABILIFY) 10 MG tablet   4.  Type 2 diabetes mellitus without complication, unspecified whether long term insulin use (HCC)  simvastatin (ZOCOR) 20 MG tablet    Empagliflozin-metFORMIN HCl ER  MG TB24    Hemoglobin A1C    Comprehensive Metabolic Panel    Lipid Panel     #1: almost at goal. Stronger DM pill could help get to goal  See plan below and FMLA is extended for 6 months with request for up to 5 shifts off per month as needed for panic attacks. We did discuss better control needed for the diabetes and I did change the empagliflozin from the 12.5 mg to 25 mg. #2 and #3: The current medical regimen is effective;  continue present plan and medications. #4: see about increasing dose. PLAN          If applicable, see additional patient information and instructions under \"Patient Instructions. \"    Return in about 3 months (around 5/12/2020). Patient Instructions   1) Get your labwork done in 3 months (May 2020). 2) Take the stronger empagliflozin-metformin (). Stop the 12.5-1000. 3) Follow-up in clinic a few days after you bloodwork is done. Please note a portion of this chart was generated using dragon dictation software. Although every effort was made to ensure the accuracy of this automated transcription, some errors in transcription may have occurred.

## 2020-05-13 RX ORDER — EMPAGLIFLOZIN, METFORMIN HYDROCHLORIDE 25; 1000 MG/1; MG/1
TABLET, EXTENDED RELEASE ORAL
Qty: 30 TABLET | Refills: 1 | Status: SHIPPED | OUTPATIENT
Start: 2020-05-13 | End: 2020-07-18

## 2020-07-10 ENCOUNTER — TELEPHONE (OUTPATIENT)
Dept: FAMILY MEDICINE CLINIC | Age: 50
End: 2020-07-10

## 2020-07-18 RX ORDER — EMPAGLIFLOZIN, METFORMIN HYDROCHLORIDE 25; 1000 MG/1; MG/1
TABLET, EXTENDED RELEASE ORAL
Qty: 30 TABLET | Refills: 0 | Status: SHIPPED | OUTPATIENT
Start: 2020-07-18 | End: 2020-09-14

## 2020-07-22 RX ORDER — LISINOPRIL 40 MG/1
TABLET ORAL
Qty: 30 TABLET | Refills: 4 | Status: SHIPPED | OUTPATIENT
Start: 2020-07-22 | End: 2021-01-13

## 2020-08-06 ENCOUNTER — OFFICE VISIT (OUTPATIENT)
Dept: FAMILY MEDICINE CLINIC | Age: 50
End: 2020-08-06
Payer: COMMERCIAL

## 2020-08-06 VITALS
DIASTOLIC BLOOD PRESSURE: 66 MMHG | TEMPERATURE: 97.6 F | OXYGEN SATURATION: 96 % | RESPIRATION RATE: 16 BRPM | HEART RATE: 83 BPM | SYSTOLIC BLOOD PRESSURE: 141 MMHG | BODY MASS INDEX: 51.91 KG/M2 | WEIGHT: 315 LBS

## 2020-08-06 PROCEDURE — 99213 OFFICE O/P EST LOW 20 MIN: CPT | Performed by: FAMILY MEDICINE

## 2020-08-06 NOTE — PROGRESS NOTES
Empagliflozin-metFORMIN HCl 12.5-1000 MG TABS Take 1 tablet by mouth 2 times daily Yes Jose Manuel Mckenzie, DO   COCONUT OIL PO Take by mouth Yes Historical Provider, MD   Cinnamon 500 MG CAPS Take by mouth Yes Historical Provider, MD [facility] OMEGA-3 KRILL OIL PO Take by mouth Yes Historical Provider, MD   aspirin EC 81 MG EC tablet Take 81 mg by mouth daily.  Yes Historical Provider, MD          Vitals:    08/06/20 0921 08/06/20 0941   BP: (!) 154/78 (!) 141/66   Pulse: 83    Resp: 16    Temp: 97.6 °F (36.4 °C)    TempSrc: Oral    SpO2: 96%    Weight: (!) 372 lb 3.2 oz (168.8 kg)       Wt Readings from Last 3 Encounters:   08/06/20 (!) 372 lb 3.2 oz (168.8 kg)   02/12/20 (!) 366 lb (166 kg)   09/12/19 (!) 361 lb (163.7 kg)     BP Readings from Last 3 Encounters:   08/06/20 (!) 141/66   02/12/20 (!) 142/74   09/12/19 130/80       Patient Active Problem List   Diagnosis    Hypertension    Depressive disorder, not elsewhere classified    Right leg pain    Eczema    Right lumbar radiculopathy    Urinary urgency    Obstructive sleep apnea    Memory loss    Abdominal mass    Diabetes mellitus, type 2 ([address]    Abdominal wall hernia    Hyperlipidemia    Chest pain    Left ventricular hypertrophy    Left atrial dilation    Right atrial dilation    Right ventricular dilation    Syncope    Family history of premature coronary artery disease    Post traumatic stress disorder    Claustrophobia    Anemia    Right foot pain    Left foot pain    Right bundle branch block    Right ankle pain    BMI 50.0-59.9, adult ([facility])    Type 2 diabetes mellitus with hemoglobin A1c goal of 7.0%-8.0% ([facility])    Ankle arthritis    Congenital tarsal coalition    Osteoarthritis of subtalar joint    Osteoarthritis of right subtalar joint    Tarsal coalitions    Ankle abrasion with infection    Wound infection after surgery    Type 2 diabetes mellitus without complication ([address]    Obesity    Insomnia due to mental 0.25     Years: 5.00     Pack years: 1.25    Smokeless tobacco: Never Used    Tobacco comment: quit smoking about 2004   Substance and Sexual Activity    Alcohol use: No    Drug use: No    Sexual activity: Yes     Partners: Female     Comment:  - [address]    Physical activity     Days per week: Not on file     Minutes per session: Not on file    Stress: Not on file   Relationships    Social connections     Talks on phone: Not on file     Gets together: Not on file     Attends Rastafarian service: Not on file     Active member of club or organization: Not on file     Attends meetings of clubs or organizations: Not on file     Relationship status: Not on file    Intimate partner violence     Fear of current or ex partner: Not on file     Emotionally abused: Not on file     Physically abused: Not on file     Forced sexual activity: Not on file   Other Topics Concern    Not on file   Social History Narrative    Not on file       O: BP (!) 141/66   Pulse 83   Temp 97.6 °F (36.4 °C) (Oral)   Resp 16   Wt (!) 372 lb 3.2 oz (168.8 kg)   SpO2 96%   BMI 51.91 kg/m²   Physical Exam  GEN: No acute distress,cooperative, well nourished, alert. HEENT: PEERLA, EOMI , normocephalic/atraumatic, nares and oropharynx clear. Mucus membranes normal, Tympanic membranes clear bilaterally. Neck: soft, supple, no thyromegaly,mass, no Lymphadenopathy  CV: Regular rate and rhythm, no murmur, rubs, gallops. No edema. Resp: Clear to auscultation bilaterally good air entry bilaterally  No crackles, wheeze. Breathing comfortably. Psych:normal affect. Neuro: AOx3          ASSESSMENT   Diagnosis Orders   1. Type 2 diabetes mellitus without complication, unspecified whether long term insulin use ([facility] [address]     2. Post traumatic stress disorder     3. Mood disorder ([facility] [address]       #1: needs better control. Continue adherence of medication. Labs are due (already ordered). #2 and #3:  [facility] paperwork renewed today and took 15 minutes to complete. PLAN          If applicable, see additional patient information and instructions under \"Patient Instructions. \"    Return in about 3 months (around 11/6/2020) for Diabetes. There are no Patient Instructions on file for this visit. Please note a portion of this chart was generated using dragon dictation software. Although every effort was made to ensure the accuracy of this automated transcription,some errors in transcription may have occurred.

## 2020-09-15 RX ORDER — EMPAGLIFLOZIN, METFORMIN HYDROCHLORIDE 25; 1000 MG/1; MG/1
TABLET, EXTENDED RELEASE ORAL
Qty: 30 TABLET | Refills: 5 | Status: SHIPPED | OUTPATIENT
Start: 2020-09-15 | End: 2021-03-25 | Stop reason: SDUPTHER

## 2020-09-16 ENCOUNTER — TELEPHONE (OUTPATIENT)
Dept: FAMILY MEDICINE CLINIC | Age: 50
End: 2020-09-16

## 2020-09-16 NOTE — TELEPHONE ENCOUNTER
Per pt's spouse, pt is needing a letter for work stating he is on medications that makes him use the restroom frequently.  Please advise

## 2020-09-16 NOTE — TELEPHONE ENCOUNTER
----- Message from [facility] sent at 9/16/2020 12:30 PM EDT -----  Subject: Message to Provider    QUESTIONS  Information for Provider? Patient's wife called again in regards to his   work note stating his medical issues which causes him to use the restroom   more frequently. If possible she is asking that the note be ready today by   3pm before he has to go into work   please advise.  ---------------------------------------------------------------------------  --------------  [address]  What is the best way for the office to contact you? OK to leave message on   voicemail  Preferred Call Back Phone Number? [phone]  ---------------------------------------------------------------------------  --------------  SCRIPT ANSWERS  Relationship to Patient? Other  Representative Name? Kevin Nichols  Is the Representative on the appropriate HIPAA document in Epic?  Yes

## 2020-09-16 NOTE — TELEPHONE ENCOUNTER
----- Message from Jovan Wallace sent at 9/16/2020  9:00 AM EDT -----  Subject: Message to Provider    QUESTIONS  Information for Provider? patient calling regarding his medication   wanted to speak to nurse   wouldnt give any more details   just wanted a call back. ---------------------------------------------------------------------------  --------------  Beba ANGELES  What is the best way for the office to contact you? OK to leave message on   voicemail  Preferred Call Back Phone Number? [phone]  ---------------------------------------------------------------------------  --------------  SCRIPT ANSWERS  Relationship to Patient?  Self

## 2020-10-06 NOTE — TELEPHONE ENCOUNTER
Per spouse, pt lost his job and was looking for samples for his prescriptions. Advised we do have samples in the office and that they can use [employer] for discounts.

## 2020-10-06 NOTE — TELEPHONE ENCOUNTER
Patient's wife would like a call back concerning patient's medications.  Please call her back at  [phone]

## 2020-10-30 ENCOUNTER — VIRTUAL VISIT (OUTPATIENT)
Dept: FAMILY MEDICINE CLINIC | Age: 50
End: 2020-10-30
Payer: COMMERCIAL

## 2020-10-30 PROCEDURE — 99442 PR PHYS/QHP TELEPHONE EVALUATION 11-20 MIN: CPT | Performed by: FAMILY MEDICINE

## 2020-10-30 RX ORDER — ARIPIPRAZOLE 10 MG/1
TABLET ORAL
Qty: 30 TABLET | Refills: 5 | Status: SHIPPED | OUTPATIENT
Start: 2020-10-30 | End: 2021-05-17

## 2020-10-30 RX ORDER — CLONIDINE HYDROCHLORIDE 0.3 MG/1
TABLET ORAL
Qty: 60 TABLET | Refills: 5 | Status: SHIPPED | OUTPATIENT
Start: 2020-10-30 | End: 2020-11-18

## 2020-10-30 RX ORDER — SIMVASTATIN 20 MG
TABLET ORAL
Qty: 30 TABLET | Refills: 5 | Status: SHIPPED | OUTPATIENT
Start: 2020-10-30 | End: 2021-05-17

## 2020-10-30 RX ORDER — AMLODIPINE BESYLATE 5 MG/1
TABLET ORAL
Qty: 30 TABLET | Refills: 5 | Status: SHIPPED | OUTPATIENT
Start: 2020-10-30 | End: 2021-04-26

## 2020-10-30 RX ORDER — TRIAMTERENE AND HYDROCHLOROTHIAZIDE 37.5; 25 MG/1; MG/1
TABLET ORAL
Qty: 30 TABLET | Refills: 5 | Status: SHIPPED | OUTPATIENT
Start: 2020-10-30 | End: 2020-11-18

## 2020-10-30 RX ORDER — TAMSULOSIN HYDROCHLORIDE 0.4 MG/1
CAPSULE ORAL
Qty: 30 CAPSULE | Refills: 5 | Status: SHIPPED | OUTPATIENT
Start: 2020-10-30 | End: 2021-05-17

## 2020-10-30 RX ORDER — CLONAZEPAM 1 MG/1
1 TABLET ORAL 2 TIMES DAILY PRN
Qty: 60 TABLET | Refills: 0 | Status: SHIPPED | OUTPATIENT
Start: 2020-10-30 | End: 2020-12-02 | Stop reason: SDUPTHER

## 2020-10-30 NOTE — PROGRESS NOTES
[facility]  TELEPHONE Progress Note  Bright Le DO      The risks and benefits of converting to a virtual visit were discussed in light of the current infectious disease epidemic. Patient also understood that insurance coverage and co-pays are up to their individual insurance plans. Patient identification was verified at the start of the visit. Hailey Almeida  1970    10/30/20    Patient location: Home address on file  Provider location: [de-identified] home    Chief Complaint:   Hailey Almeida is a 45 y.o. patient who is here for anxiety        HPI:   doxy VV [phone], severe anxiety past 7 days, depression, shaky feeling, did not work wed-fri, feeling better today, no attacks today      ROS negative for headache, vision changes, chest pain, shortness of breath, abdominal pain, urinary sx, bowel changes. Past medical, surgical, and social history reviewed. Medications and allergies reviewed. No Known Allergies  Prior to Visit Medications    Medication Sig Taking? Authorizing Provider   amLODIPine (NORVASC) 5 MG tablet TAKE ONE TABLET BY MOUTH DAILY Yes Sridhar Mckenzie DO   ARIPiprazole (ABILIFY) 10 MG tablet TAKE ONE TABLET BY MOUTH DAILY Yes Sridhar Mckenzie DO   triamterene-hydroCHLOROthiazide (MAXZIDE-25) 37.5-25 MG per tablet TAKE ONE TABLET BY MOUTH DAILY Yes Sridhar Mckenzie DO   cloNIDine (CATAPRES) 0.3 MG tablet TAKE ONE TABLET BY MOUTH TWICE A DAY Yes Tor Mckenzie DO   tamsulosin (FLOMAX) 0.4 MG capsule TAKE ONE CAPSULE BY MOUTH ONCE NIGHTLY Yes Sridhar Mckenzie DO   simvastatin (ZOCOR) 20 MG tablet TAKE ONE TABLET BY MOUTH EVERY EVENING Yes Sridhar Mckenzie DO   clonazePAM (KLONOPIN) 1 MG tablet Take 1 tablet by mouth 2 times daily as needed for Anxiety for up to 30 days.  Yes Tor Mckenzie DO   SYNJARDY XR  MG TB24 TAKE ONE TABLET BY MOUTH DAILY Yes Sridhar Mckenzie DO   lisinopril (PRINIVIL;ZESTRIL) 40 MG tablet TAKE ONE TABLET BY MOUTH DAILY Yes Den Terry Bingcang, DO   COCONUT OIL PO Take by mouth Yes Historical Provider, MD   Cinnamon 500 MG CAPS Take by mouth Yes Historical Provider, MD MALDONADO OMEGA-3 KRILL OIL PO Take by mouth Yes Historical Provider, MD   aspirin EC 81 MG EC tablet Take 81 mg by mouth daily. Yes Historical Provider, MD          Patient-Reported Vitals 10/30/2020   Patient-Reported Weight 375 lb   Patient-Reported Height 5ft 11 in      There were no vitals filed for this visit.    Wt Readings from Last 3 Encounters:   08/06/20 (!) 372 lb 3.2 oz (168.8 kg)   02/12/20 (!) 366 lb (166 kg)   09/12/19 (!) 361 lb (163.7 kg)     BP Readings from Last 3 Encounters:   08/06/20 (!) 141/66   02/12/20 (!) 142/74   09/12/19 130/80       Patient Active Problem List   Diagnosis    Hypertension    Depressive disorder, not elsewhere classified    Right leg pain    Eczema    Right lumbar radiculopathy    Urinary urgency    Obstructive sleep apnea    Memory loss    Abdominal mass    Diabetes mellitus, type 2 ([address]    Abdominal wall hernia    Hyperlipidemia    Chest pain    Left ventricular hypertrophy    Left atrial dilation    Right atrial dilation    Right ventricular dilation    Syncope    Family history of premature coronary artery disease    Post traumatic stress disorder    Claustrophobia    Anemia    Right foot pain    Left foot pain    Right bundle branch block    Right ankle pain    BMI 50.0-59.9, adult (HCC)    Type 2 diabetes mellitus with hemoglobin A1c goal of 7.0%-8.0% ([facility])    Ankle arthritis    Congenital tarsal coalition    Osteoarthritis of subtalar joint    Osteoarthritis of right subtalar joint    Tarsal coalitions    Ankle abrasion with infection    Wound infection after surgery    Type 2 diabetes mellitus without complication ([address]    Obesity    Insomnia due to mental disorder       Immunization History   Administered Date(s) Administered    Influenza Virus Vaccine 03/24/2016    Influenza, Quadv, IM, (6 mo and older Fluzone, Flulaval, Fluarix and 3 yrs and older Afluria) 09/12/2019       Past Medical History:   Diagnosis Date    Abdominal wall hernia 2/21/2012    Anemia 4/15/2013    Claustrophobia 4/15/2013    Depression 3/8/2011    Diabetes mellitus, type 2 ([address] 2/20/2012    Family history of premature coronary artery disease 6/19/2012    Hyperlipidemia 2/27/2012    Hypertension 3/8/2011    Insomnia 2/17/2012    Left atrial dilation 6/19/2012    Left ventricular hypertrophy 6/19/2012    Neuropathy     Obesity 3/8/2011    Obstructive sleep apnea 2/17/2012    uses Cpap sometimes    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    Post traumatic stress disorder 4/15/2013    Right atrial dilation 6/19/2012    Right bundle branch block 5/15/2014    Right ventricular dilation 6/19/2012     Past Surgical History:   Procedure Laterality Date    CARDIOVASCULAR STRESS TEST  May 28 2014    Non-Imaging Stress Test    CIRCUMCISION  03/2017    Due to phimosis: Dr. Jenniffer Hodges Right 2/1/2016    ankle subtower fusion with bone graft using c-arm     Family History   Problem Relation Age of Onset    Heart Disease Mother     Coronary Art Dis Mother     Heart Attack Mother     Mental Illness Mother      Social History     Socioeconomic History    Marital status:      Spouse name: Miguel Olivier Number of children: 0    Years of education: Not on file    Highest education level: Not on file   Occupational History    Occupation:    Social Needs    Financial resource strain: Not on file    Food insecurity     Worry: Not on file     Inability: Not on file   [employer] needs     Medical: Not on file     Non-medical: Not on file   Tobacco Use    Smoking status: Former Smoker     Packs/day: 0.25     Years: 5.00     Pack years: 1.25    Smokeless tobacco: Never Used    Tobacco comment: quit smoking about 2004   Substance and Sexual Activity    Alcohol use: No    Drug use: No    Sexual activity: Yes     Partners: Female     Comment:  - [id number]    Physical activity     Days per week: Not on file     Minutes per session: Not on file    Stress: Not on file   Relationships    Social connections     Talks on phone: Not on file     Gets together: Not on file     Attends Mandaen service: Not on file     Active member of club or organization: Not on file     Attends meetings of clubs or organizations: Not on file     Relationship status: Not on file    Intimate partner violence     Fear of current or ex partner: Not on file     Emotionally abused: Not on file     Physically abused: Not on file     Forced sexual activity: Not on file   Other Topics Concern    Not on file   Social History Narrative    Not on file       O: There were no vitals taken for this visit. Physical Exam  PHYSICAL EXAMINATION:  [ INSTRUCTIONS:  \"[x]\" Indicates a positive item  \"[]\" Indicates a negative item  -- DELETE ALL ITEMS NOT EXAMINED]  Vital Signs: (As obtained by patient/caregiver or practitioner observation)    Constitutional: [x] Appears well-developed and well-nourished [x] No apparent distress      [] Abnormal-   Mental status  [x] Alert and awake  [x] Oriented to person/place/time [x]Able to follow commands      Eyes:  EOM    [x]  Normal  [] Abnormal-  Sclera  [x]  Normal  [] Abnormal -         Discharge [x]  None visible  [] Abnormal -    HENT:   [x] Normocephalic, atraumatic.   [] Abnormal   [] Mouth/Throat: Mucous membranes are moist.     External Ears [x] Normal  [] Abnormal-     Neck: [x] No visualized mass     Pulmonary/Chest: [x] Respiratory effort normal.  [x] No visualized signs of difficulty breathing or respiratory distress        [] Abnormal-      Musculoskeletal:   [] Normal gait with no signs of ataxia         [x] Normal range of motion of neck        [] Abnormal-       Neurological:        [x] No Facial Asymmetry (Cranial nerve 7 motor function) (limited exam to video visit)          [x] No gaze palsy        [] Abnormal-         Skin:        [x] No significant exanthematous lesions or discoloration noted on facial skin         [] Abnormal-            Psychiatric:       [x] Normal Affect [] No Hallucinations        [] Abnormal-     Other pertinent observable physical exam findings- n/a    Due to this being a TeleHealth encounter, evaluation of the following organ systems is limited: Vitals/Constitutional/EENT/Resp/CV/GI//MS/Neuro/Skin/Heme-Lymph-Imm. ASSESSMENT   Diagnosis Orders   1. Adjustment disorder with anxiety  clonazePAM (KLONOPIN) 1 MG tablet   2. Essential hypertension  amLODIPine (NORVASC) 5 MG tablet    triamterene-hydroCHLOROthiazide (MAXZIDE-25) 37.5-25 MG per tablet    cloNIDine (CATAPRES) 0.3 MG tablet   3. Post traumatic stress disorder  ARIPiprazole (ABILIFY) 10 MG tablet   4. Mood disorder (HCC)  ARIPiprazole (ABILIFY) 10 MG tablet   5. Type 2 diabetes mellitus without complication, unspecified whether long term insulin use (HCC)  simvastatin (ZOCOR) 20 MG tablet       #1: The risks, benefits, potential side effects and barriers to medication use were addressed today. Understanding was acknowledged. Patient asked to follow-up if condition(s) do not improve as anticipated. #2: The current medical regimen is effective;  continue present plan and medications. #3 and #4: The current medical regimen is effective;  continue present plan and medications. #5: The current medical regimen is effective;  continue present plan and medications. PLAN          If applicable, see additional patient information and instructions under \"Patient Instructions. \"    Return in about 2 weeks (around 11/13/2020). There are no Patient Instructions on file for this visit.     TOTAL TIME (in minutes) SPENT ON CONFERENCING: 25    Please note a portion of this chart was generated using dragon dictation software. Although every effort was made to ensure the accuracy of this automated transcription, some errors in transcription may have occurred. Pursuant to the emergency declaration under the [facility], [facility] waiver authority and the [employer] and [employer] Act, this Virtual  Visit was conducted, with patient's consent, to reduce the patient's risk of exposure to COVID-19 and provide continuity of care for an established patient. Services were provided through a video synchronous discussion virtually to substitute for in-person clinic visit. Patient was instructed that the AVS is available on My Chart or was emailed to the patient if not on My Chart. Lab orders were emailed to patient if they do not use a [facility] lab. Any work notes were sent to patient through My Chart or email.

## 2020-11-18 RX ORDER — CLONIDINE HYDROCHLORIDE 0.3 MG/1
TABLET ORAL
Qty: 60 TABLET | Refills: 0 | Status: SHIPPED | OUTPATIENT
Start: 2020-11-18 | End: 2020-12-22

## 2020-11-18 RX ORDER — TRIAMTERENE AND HYDROCHLOROTHIAZIDE 37.5; 25 MG/1; MG/1
TABLET ORAL
Qty: 30 TABLET | Refills: 0 | Status: SHIPPED | OUTPATIENT
Start: 2020-11-18 | End: 2020-12-22

## 2020-12-01 ENCOUNTER — TELEPHONE (OUTPATIENT)
Dept: FAMILY MEDICINE CLINIC | Age: 50
End: 2020-12-01

## 2020-12-02 ENCOUNTER — OFFICE VISIT (OUTPATIENT)
Dept: FAMILY MEDICINE CLINIC | Age: 50
End: 2020-12-02

## 2020-12-02 VITALS
RESPIRATION RATE: 16 BRPM | TEMPERATURE: 97.1 F | HEIGHT: 71 IN | DIASTOLIC BLOOD PRESSURE: 84 MMHG | SYSTOLIC BLOOD PRESSURE: 158 MMHG | OXYGEN SATURATION: 95 % | HEART RATE: 88 BPM | BODY MASS INDEX: 44.1 KG/M2 | WEIGHT: 315 LBS

## 2020-12-02 PROCEDURE — 99214 OFFICE O/P EST MOD 30 MIN: CPT | Performed by: FAMILY MEDICINE

## 2020-12-02 RX ORDER — NYSTATIN 100000 [USP'U]/G
POWDER TOPICAL
Qty: 2 BOTTLE | Refills: 2 | Status: SHIPPED | OUTPATIENT
Start: 2020-12-02 | End: 2022-06-08

## 2020-12-02 RX ORDER — SULFAMETHOXAZOLE AND TRIMETHOPRIM 800; 160 MG/1; MG/1
1 TABLET ORAL 2 TIMES DAILY
Qty: 10 TABLET | Refills: 0 | Status: SHIPPED | OUTPATIENT
Start: 2020-12-02 | End: 2020-12-07

## 2020-12-02 RX ORDER — CLONAZEPAM 1 MG/1
1 TABLET ORAL 3 TIMES DAILY PRN
Qty: 90 TABLET | Refills: 0 | Status: SHIPPED | OUTPATIENT
Start: 2020-12-02 | End: 2021-01-29

## 2020-12-02 NOTE — PROGRESS NOTES
Immunization History   Administered Date(s) Administered    Influenza Virus Vaccine 03/24/2016    Influenza, Ora Danish, IM, (6 mo and older Fluzone, Flulaval, Fluarix and 3 yrs and older Afluria) 09/12/2019       Past Medical History:   Diagnosis Date    Abdominal wall hernia 2/21/2012    Anemia 4/15/2013    Claustrophobia 4/15/2013    Depression 3/8/2011    Diabetes mellitus, type 2 [address] [address] 2/20/2012    Family history of premature coronary artery disease 6/19/2012    Hyperlipidemia 2/27/2012    Hypertension 3/8/2011    Insomnia 2/17/2012    Left atrial dilation 6/19/2012    Left ventricular hypertrophy 6/19/2012    Neuropathy     Obesity 3/8/2011    Obstructive sleep apnea 2/17/2012    uses Cpap sometimes    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    Post traumatic stress disorder 4/15/2013    Right atrial dilation 6/19/2012    Right bundle branch block 5/15/2014    Right ventricular dilation 6/19/2012     Past Surgical History:   Procedure Laterality Date    CARDIOVASCULAR STRESS TEST  May 28 2014    Non-Imaging Stress Test    CIRCUMCISION  03/2017    Due to phimosis: Dr. Saul Kennedy Right 2/1/2016    ankle subtower fusion with bone graft using c-arm     Family History   Problem Relation Age of Onset    Heart Disease Mother     Coronary Art Dis Mother     Heart Attack Mother     Mental Illness Mother      Social History     Socioeconomic History    Marital status:      Spouse name: Gomez Conway Number of children: 0    Years of education: Not on file    Highest education level: Not on file   Occupational History    Occupation:    Social Needs    Financial resource strain: Not on file    Food insecurity     Worry: Not on file     Inability: Not on file   [employer] needs     Medical: Not on file     Non-medical: Not on file   Tobacco Use    Smoking status: Former Smoker     Packs/day: 0.25 Years: 5.00     Pack years: 1.25    Smokeless tobacco: Never Used    Tobacco comment: quit smoking about 2004   Substance and Sexual Activity    Alcohol use: No    Drug use: No    Sexual activity: Yes     Partners: Female     Comment:  - [id number]    Physical activity     Days per week: Not on file     Minutes per session: Not on file    Stress: Not on file   Relationships    Social connections     Talks on phone: Not on file     Gets together: Not on file     Attends Congregational service: Not on file     Active member of club or organization: Not on file     Attends meetings of clubs or organizations: Not on file     Relationship status: Not on file    Intimate partner violence     Fear of current or ex partner: Not on file     Emotionally abused: Not on file     Physically abused: Not on file     Forced sexual activity: Not on file   Other Topics Concern    Not on file   Social History Narrative    Not on file       O: BP (!) 158/84   Pulse 88   Temp 97.1 °F (36.2 °C) (Temporal)   Resp 16   Ht 5' 11\" (1.803 m)   Wt (!) 368 lb (166.9 kg)   SpO2 95%   BMI 51.33 kg/m²   Physical Exam  GEN: No acute distress,cooperative, well nourished, alert. HEENT: PEERLA, EOMI , normocephalic/atraumatic, external nose appears normal.  External ear is normal.    Neck: soft, supple, no appreciable thyromegaly,mass  CV: No upper extremity edema. Resp:  Breathing comfortably. Psych: Dysthymic affect. Denies SI/HI  Neuro: AOx3  Other Pertinent Physical Exam findings: 2 or 3 small furuncles in the thigh. Tinea cruris noted bilaterally    Lab Results   Component Value Date    LABA1C 12.5 12/13/2019     Lab Results   Component Value Date    .1 12/13/2019         ASSESSMENT   Diagnosis Orders   1. Adjustment disorder with anxiety  clonazePAM (KLONOPIN) 1 MG tablet   2. Essential hypertension     3.  Type 2 diabetes mellitus with hemoglobin A1c goal of 7.0%-8.0% ([facility]) 4. Post traumatic stress disorder     5. Furuncle  mupirocin (BACTROBAN) 2 % ointment    sulfamethoxazole-trimethoprim (BACTRIM DS;SEPTRA DS) 800-160 MG per tablet   6. Tinea cruris  nystatin (MYCOSTATIN) 892225 UNIT/GM powder     #1: The current medical regimen is effective;  continue present plan and medications. #2:  Continue to monitor at home and call back with blood pressure is still elevated. #3: Needs better control and close follow-up. #4  Contributed with #1  #5 and #6: The risks, benefits, potential side effects and barriers to medication use were addressed today. Understanding was acknowledged. Patient asked to follow-up if condition(s) do not improve as anticipated. \          PLAN          If applicable, see additional patient information and instructions under \"Patient Instructions. \"    Return in about 4 weeks (around 12/30/2020) for Anxiety, Depression, Diabetes. Patient Instructions   TAKE THE MEDICATION. CALL WITH UPDATE NEXT WEEK HOW YOU ARE DOING MENTALLY AND PHYSICALLY. IT IS IDEAL TO SET UP FOLLOW-UP APPOINTMENT FOR 2 TO 4 WEEKS FROM NOW. ENJOY TIME WITH FAMILY AND YOUR DOG        Please note a portion of this chart was generated using dragon dictation software. Although every effort was made to ensure the accuracy of this automated transcription,some errors in transcription may have occurred.

## 2020-12-08 ENCOUNTER — TELEPHONE (OUTPATIENT)
Dept: FAMILY MEDICINE CLINIC | Age: 50
End: 2020-12-08

## 2020-12-09 NOTE — TELEPHONE ENCOUNTER
Patient has seen Dr. Kinga Proctor before but it has been a few years. She only does telehealth at this time. Would that suit Ovi's need? I will ask clinic staff to [facility] check with him. I will also message Dr. Kinga Proctor to see if she does this kind of work.

## 2020-12-09 NOTE — TELEPHONE ENCOUNTER
Per spouse, pt is applying for disability and needs to have a referral to see a psychiatrist or psychologist. Please advise

## 2020-12-14 ENCOUNTER — TELEPHONE (OUTPATIENT)
Dept: FAMILY MEDICINE CLINIC | Age: 50
End: 2020-12-14

## 2020-12-14 NOTE — TELEPHONE ENCOUNTER
Patient requesting a copy of his records from Dr. Iram Levy (psychiatrist). Signed authorization to release records in [facility]. Records printed and filed at . Records to be picked up tomorrow (12/15/2020) by patient's spouse Budd Prior.

## 2020-12-23 RX ORDER — CLONIDINE HYDROCHLORIDE 0.3 MG/1
TABLET ORAL
Qty: 60 TABLET | Refills: 5 | Status: SHIPPED | OUTPATIENT
Start: 2020-12-23 | End: 2021-07-06

## 2020-12-23 RX ORDER — TRIAMTERENE AND HYDROCHLOROTHIAZIDE 37.5; 25 MG/1; MG/1
TABLET ORAL
Qty: 30 TABLET | Refills: 5 | Status: SHIPPED | OUTPATIENT
Start: 2020-12-23 | End: 2021-07-06

## 2021-01-06 ENCOUNTER — OFFICE VISIT (OUTPATIENT)
Dept: FAMILY MEDICINE CLINIC | Age: 51
End: 2021-01-06

## 2021-01-06 VITALS
SYSTOLIC BLOOD PRESSURE: 134 MMHG | BODY MASS INDEX: 52.19 KG/M2 | TEMPERATURE: 96.7 F | WEIGHT: 315 LBS | HEART RATE: 74 BPM | DIASTOLIC BLOOD PRESSURE: 73 MMHG | OXYGEN SATURATION: 96 % | RESPIRATION RATE: 16 BRPM

## 2021-01-06 DIAGNOSIS — F33.1 MODERATE EPISODE OF RECURRENT MAJOR DEPRESSIVE DISORDER (HCC): ICD-10-CM

## 2021-01-06 DIAGNOSIS — F51.05 INSOMNIA DUE TO MENTAL DISORDER: ICD-10-CM

## 2021-01-06 DIAGNOSIS — F41.1 GAD (GENERALIZED ANXIETY DISORDER): ICD-10-CM

## 2021-01-06 DIAGNOSIS — F43.10 POST TRAUMATIC STRESS DISORDER: Primary | Chronic | ICD-10-CM

## 2021-01-06 DIAGNOSIS — M54.16 RIGHT LUMBAR RADICULOPATHY: ICD-10-CM

## 2021-01-06 PROCEDURE — 99213 OFFICE O/P EST LOW 20 MIN: CPT | Performed by: FAMILY MEDICINE

## 2021-01-06 NOTE — PROGRESS NOTES
[facility]  Progress Note  DO Ovi Melendez  1970    01/07/21    Chief Complaint:   Long Luu is a 45 y.o. male who is here for disability paperwork        HPI:   Prompted by his  to see me as his primary care physician to complete disability paperwork. I have known patient for more than 5 years now. I am aware of his struggles with work due to mental health condition of chronic PTSD, RYAN along with depression and insomnia. He has made efforts to seek counseling with talk therapist and see psychiatry. He has frequent panic attacks. He finds it difficult to be a passenger in a car. Even as the  he has increased anxiety. Takes medication to ease these mental health conditions including as needed Klonopin which is at times helpful and other times not helpful. Additionally he experiences increased physical pain when the anxiety attack worsens. He is trying to get the sleep apnea controlled but finds that when the anxiety is worse he has problems with his sleep. Currently he finds financial ends meet with his wife working and brother working. ROS negative for headache, visionchanges, chest pain, shortness of breath, abdominal pain, urinary sx, bowel changes. Past medical, surgical, and social history reviewed. and allergies reviewed. No Known Allergies  Prior to Visit Medications    Medication Sig Taking? Authorizing Provider   triamterene-hydroCHLOROthiazide (MAXZIDE-25) 37.5-25 MG per tablet Take 1 tablet by mouth once daily Yes Sol Belggivis Mckenzie, DO   cloNIDine (CATAPRES) 0.3 MG tablet Take 1 tablet by mouth twice daily Yes Sol Belggivis Cedenong, DO   clonazePAM (KLONOPIN) 1 MG tablet Take 1 tablet by mouth 3 times daily as needed for Anxiety for up to 30 days.  Yes Sol Staggivis Mckenzie, DO   nystatin (MYCOSTATIN) 116775 UNIT/GM powder Apply 3 times daily to the groin Yes Fanta Valencia, DO amLODIPine (NORVASC) 5 MG tablet TAKE ONE TABLET BY MOUTH DAILY Yes [facility] Catrachito Magaly, DO   ARIPiprazole (ABILIFY) 10 MG tablet TAKE ONE TABLET BY MOUTH DAILY Yes [facility] Catrachito Magaly, DO   tamsulosin (FLOMAX) 0.4 MG capsule TAKE ONE CAPSULE BY MOUTH ONCE NIGHTLY Yes Tor Mckenzie DO   simvastatin (ZOCOR) 20 MG tablet TAKE ONE TABLET BY MOUTH EVERY EVENING Yes Tor Mckenzie DO   SYNJARDY XR  MG TB24 TAKE ONE TABLET BY MOUTH DAILY Yes [facility] Catrachito Magaly, DO   lisinopril (PRINIVIL;ZESTRIL) 40 MG tablet TAKE ONE TABLET BY MOUTH DAILY Yes [facility] Magaly, DO   COCONUT OIL PO Take by mouth Yes Historical Provider, MD   Cinnamon 500 MG CAPS Take by mouth Yes Historical Provider, MD MALDONADO OMEGA-3 KRILL OIL PO Take by mouth Yes Historical Provider, MD   aspirin EC 81 MG EC tablet Take 81 mg by mouth daily.  Yes Historical Provider, MD          Vitals:    01/06/21 1129   BP: 134/73   Pulse: 74   Resp: 16   Temp: 96.7 °F (35.9 °C)   TempSrc: Tympanic   SpO2: 96%   Weight: (!) 374 lb 3.2 oz (169.7 kg)      Wt Readings from Last 3 Encounters:   01/06/21 (!) 374 lb 3.2 oz (169.7 kg)   12/02/20 (!) 368 lb (166.9 kg)   08/06/20 (!) 372 lb 3.2 oz (168.8 kg)     BP Readings from Last 3 Encounters:   01/06/21 134/73   12/02/20 (!) 158/84   08/06/20 (!) 141/66       Patient Active Problem List   Diagnosis    Hypertension    Moderate episode of recurrent major depressive disorder (HCC)    Right leg pain    Eczema    Right lumbar radiculopathy    Urinary urgency    Obstructive sleep apnea    Memory loss    Abdominal mass    Diabetes mellitus, type 2 (HCC)    Abdominal wall hernia    Hyperlipidemia    Chest pain    Left ventricular hypertrophy    Left atrial dilation    Right atrial dilation    Right ventricular dilation    Syncope    Family history of premature coronary artery disease    Post traumatic stress disorder    Claustrophobia    Anemia    Right foot pain  Left foot pain    Right bundle branch block    Right ankle pain    BMI 50.0-59.9, adult (HCC)    Type 2 diabetes mellitus with hemoglobin A1c goal of 7.0%-8.0% ([facility])    Ankle arthritis    Congenital tarsal coalition    Osteoarthritis of subtalar joint    Osteoarthritis of right subtalar joint    Tarsal coalitions    Ankle abrasion with infection    Wound infection after surgery    Type 2 diabetes mellitus without complication ([facility] [address]    Obesity    Insomnia due to mental disorder    RYAN (generalized anxiety disorder)       Immunization History   Administered Date(s) Administered    Influenza Virus Vaccine 03/24/2016    Influenza, Quadv, IM, (6 mo and older Fluzone, Flulaval, Fluarix and 3 yrs and older Afluria) 09/12/2019       Past Medical History:   Diagnosis Date    Abdominal wall hernia 2/21/2012    Anemia 4/15/2013    Claustrophobia 4/15/2013    Depression 3/8/2011    Diabetes mellitus, type 2 ([facility] [address] 2/20/2012    Family history of premature coronary artery disease 6/19/2012    Hyperlipidemia 2/27/2012    Hypertension 3/8/2011    Insomnia 2/17/2012    Left atrial dilation 6/19/2012    Left ventricular hypertrophy 6/19/2012    Neuropathy     Obesity 3/8/2011    Obstructive sleep apnea 2/17/2012    uses Cpap sometimes    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    Post traumatic stress disorder 4/15/2013    Right atrial dilation 6/19/2012    Right bundle branch block 5/15/2014    Right ventricular dilation 6/19/2012     Past Surgical History:   Procedure Laterality Date    CARDIOVASCULAR STRESS TEST  May 28 2014    Non-Imaging Stress Test    CIRCUMCISION  03/2017    Due to phimosis: Dr. Willy De Leon Right 2/1/2016    ankle subtower fusion with bone graft using c-arm     Family History   Problem Relation Age of Onset    Heart Disease Mother     Coronary Art Dis Mother     Heart Attack Mother  Mental Illness Mother      Social History     Socioeconomic History    Marital status:      Spouse name: Gabe Collins Number of children: 0    Years of education: Not on file    Highest education level: Not on file   Occupational History    Occupation:    Social Needs    Financial resource strain: Not on file    Food insecurity     Worry: Not on file     Inability: Not on file   [employer] needs     Medical: Not on file     Non-medical: Not on file   Tobacco Use    Smoking status: Former Smoker     Packs/day: 0.25     Years: 5.00     Pack years: 1.25    Smokeless tobacco: Never Used    Tobacco comment: quit smoking about 2004   Substance and Sexual Activity    Alcohol use: No    Drug use: No    Sexual activity: Yes     Partners: Female     Comment:  - [address]   Lifestyle    Physical activity     Days per week: Not on file     Minutes per session: Not on file    Stress: Not on file   Relationships    Social connections     Talks on phone: Not on file     Gets together: Not on file     Attends Yazdanism service: Not on file     Active member of club or organization: Not on file     Attends meetings of clubs or organizations: Not on file     Relationship status: Not on file    Intimate partner violence     Fear of current or ex partner: Not on file     Emotionally abused: Not on file     Physically abused: Not on file     Forced sexual activity: Not on file   Other Topics Concern    Not on file   Social History Narrative    Not on file       O: /73   Pulse 74   Temp 96.7 °F (35.9 °C) (Tympanic)   Resp 16   Wt (!) 374 lb 3.2 oz (169.7 kg)   SpO2 96%   BMI 52.19 kg/m²   Physical Exam  GEN: No acute distress,cooperative, well nourished, alert. HEENT: PEERLA, EOMI , normocephalic/atraumatic, external nose appears normal.  External ear is normal.    Neck: soft, supple, no appreciable thyromegaly,mass  CV: No upper extremity edema. Resp:  Breathing comfortably. Psych: Anxious and dysthymic affect. Denies any SI/HI  Neuro: AOx3  Other Pertinent Physical Exam findings:         ASSESSMENT   Diagnosis Orders   1. Post traumatic stress disorder     2. Moderate episode of recurrent major depressive disorder ([facility] [address]     3. Insomnia due to mental disorder     4. RYAN (generalized anxiety disorder)     5. Right lumbar radiculopathy         Just over 20 minutes spent completing the paperwork. Original given to patient. He will forward to the . Continue medications for #1 through #5. He currently is between insurance plans and he is awaiting to see if he was accepted for affordable care act. Ideally recommended follow-up in 2 to 3 months      PLAN     Encouraged enrolling in insurance      If applicable, see additional patient information and instructions under \"Patient Instructions. \"    Return for Anxiety, Depression, Insomnia in 2 to 3 months. There are no Patient Instructions on file for this visit. Please note a portion of this chart was generated using dragon dictation software. Although every effort was made to ensure the accuracy of this automated transcription,some errors in transcription may have occurred.

## 2021-01-07 PROBLEM — F41.1 GAD (GENERALIZED ANXIETY DISORDER): Status: ACTIVE | Noted: 2021-01-07

## 2021-01-13 DIAGNOSIS — I10 ESSENTIAL HYPERTENSION: Chronic | ICD-10-CM

## 2021-01-13 DIAGNOSIS — E11.9 TYPE 2 DIABETES, HBA1C GOAL < 8% (HCC): ICD-10-CM

## 2021-01-13 RX ORDER — LISINOPRIL 40 MG/1
TABLET ORAL
Qty: 30 TABLET | Refills: 5 | Status: SHIPPED | OUTPATIENT
Start: 2021-01-13 | End: 2021-07-11

## 2021-03-12 ENCOUNTER — TELEPHONE (OUTPATIENT)
Dept: FAMILY MEDICINE CLINIC | Age: 51
End: 2021-03-12

## 2021-03-12 DIAGNOSIS — E11.9 TYPE 2 DIABETES, HBA1C GOAL < 8% (HCC): Primary | ICD-10-CM

## 2021-03-12 NOTE — TELEPHONE ENCOUNTER
Please advise  Thank you   ----- Message from Paz Kong sent at 3/12/2021  4:29 PM EST -----  Subject: Referral Request    QUESTIONS   Reason for referral request? Patient's wife Geetha Friend would like to get a   referral for a psychiatrist for [place]. Has the physician seen you for this condition before? No   Preferred Specialist (if applicable)? Do you already have an appointment scheduled? No  Additional Information for Provider?   ---------------------------------------------------------------------------  --------------  CALL BACK INFO  What is the best way for the office to contact you? OK to leave message on   voicemail  Preferred Call Back Phone Number?  [phone]

## 2021-03-15 NOTE — TELEPHONE ENCOUNTER
Fasting lab work orders are in place. I need for patient to get these lab tests done for me to get a better idea of how to adjust medication. Thank arrange in person or telehealth appointment to review lab work. It takes her lab approximately 48 hours process A1c.

## 2021-03-15 NOTE — TELEPHONE ENCOUNTER
Angelo Amos advised    Requesting updated lab orders. States his blood sugar has been high.  May need med adjustment also

## 2021-03-15 NOTE — TELEPHONE ENCOUNTER
GIVE THESE 2 PH# FOR PATIENT OR WIFE TO CALL TO VERIFY INSURANCE IS TAKEN AND CHOOSE WHICH ONE MAKES SENSE TO Alexandra Host:       1)   [facility]    [place]   [phone] or [phone]    Partial Hospitalization Program (PHP) is 20 hours/week of group therapy with weekly Psychiatric consultation. Patients see a provider within 2 days of starting program.  Typical course of treatment is 2-4 weeks. Intensive Outpatient Program (IOP) is 8-12 hours/week of group therapy with monthly Psychiatric consultation. Patients see a provider within 2 days of starting program.  Typical course of treatment is 3-6 weeks.   Appropriate level of care is determined at intake.     --OR--    [facility]:  Main Offices:  Phone: [phone]  Fax: [phone]    Sathya Alvarez [address] [place] [address], [address] [place] [address]. [address]

## 2021-03-17 DIAGNOSIS — E11.9 TYPE 2 DIABETES, HBA1C GOAL < 8% (HCC): ICD-10-CM

## 2021-03-17 LAB
A/G RATIO: 1.2 (ref 1.1–2.2)
ALBUMIN SERPL-MCNC: 4.1 G/DL (ref 3.4–5)
ALP BLD-CCNC: 71 U/L (ref 40–129)
ALT SERPL-CCNC: 175 U/L (ref 10–40)
ANION GAP SERPL CALCULATED.3IONS-SCNC: 13 MMOL/L (ref 3–16)
AST SERPL-CCNC: 72 U/L (ref 15–37)
BILIRUB SERPL-MCNC: 0.3 MG/DL (ref 0–1)
BUN BLDV-MCNC: 20 MG/DL (ref 7–20)
CALCIUM SERPL-MCNC: 9.5 MG/DL (ref 8.3–10.6)
CHLORIDE BLD-SCNC: 97 MMOL/L (ref 99–110)
CHOLESTEROL, TOTAL: 176 MG/DL (ref 0–199)
CO2: 25 MMOL/L (ref 21–32)
CREAT SERPL-MCNC: 0.7 MG/DL (ref 0.9–1.3)
CREATININE URINE: 68.5 MG/DL (ref 39–259)
GFR AFRICAN AMERICAN: >60
GFR NON-AFRICAN AMERICAN: >60
GLOBULIN: 3.3 G/DL
GLUCOSE BLD-MCNC: 354 MG/DL (ref 70–99)
HDLC SERPL-MCNC: 56 MG/DL (ref 40–60)
LDL CHOLESTEROL CALCULATED: 99 MG/DL
MICROALBUMIN UR-MCNC: <1.2 MG/DL
MICROALBUMIN/CREAT UR-RTO: NORMAL MG/G (ref 0–30)
POTASSIUM SERPL-SCNC: 4.5 MMOL/L (ref 3.5–5.1)
SODIUM BLD-SCNC: 135 MMOL/L (ref 136–145)
TOTAL PROTEIN: 7.4 G/DL (ref 6.4–8.2)
TRIGL SERPL-MCNC: 107 MG/DL (ref 0–150)
VLDLC SERPL CALC-MCNC: 21 MG/DL

## 2021-03-18 ENCOUNTER — IMMUNIZATION (OUTPATIENT)
Dept: FAMILY MEDICINE CLINIC | Age: 51
End: 2021-03-18
Payer: COMMERCIAL

## 2021-03-18 LAB
ESTIMATED AVERAGE GLUCOSE: 395.3 MG/DL
HBA1C MFR BLD: 15.4 %

## 2021-03-18 PROCEDURE — 0001A COVID-19, PFIZER VACCINE 30MCG/0.3ML DOSE: CPT | Performed by: NURSE PRACTITIONER

## 2021-03-18 PROCEDURE — 91300 COVID-19, PFIZER VACCINE 30MCG/0.3ML DOSE: CPT | Performed by: NURSE PRACTITIONER

## 2021-03-23 DIAGNOSIS — F43.22 ADJUSTMENT DISORDER WITH ANXIETY: ICD-10-CM

## 2021-03-23 RX ORDER — CLONAZEPAM 1 MG/1
TABLET ORAL
Qty: 90 TABLET | Refills: 0 | Status: SHIPPED | OUTPATIENT
Start: 2021-03-23 | End: 2021-05-12

## 2021-03-25 ENCOUNTER — OFFICE VISIT (OUTPATIENT)
Dept: FAMILY MEDICINE CLINIC | Age: 51
End: 2021-03-25
Payer: COMMERCIAL

## 2021-03-25 VITALS
RESPIRATION RATE: 16 BRPM | WEIGHT: 315 LBS | DIASTOLIC BLOOD PRESSURE: 82 MMHG | BODY MASS INDEX: 52.55 KG/M2 | SYSTOLIC BLOOD PRESSURE: 137 MMHG | TEMPERATURE: 97.7 F | HEART RATE: 105 BPM | OXYGEN SATURATION: 95 %

## 2021-03-25 DIAGNOSIS — F43.10 POST TRAUMATIC STRESS DISORDER: Primary | Chronic | ICD-10-CM

## 2021-03-25 DIAGNOSIS — E11.9 TYPE 2 DIABETES MELLITUS WITHOUT COMPLICATION, UNSPECIFIED WHETHER LONG TERM INSULIN USE (HCC): ICD-10-CM

## 2021-03-25 DIAGNOSIS — F41.1 GAD (GENERALIZED ANXIETY DISORDER): ICD-10-CM

## 2021-03-25 PROCEDURE — 99213 OFFICE O/P EST LOW 20 MIN: CPT | Performed by: FAMILY MEDICINE

## 2021-03-25 RX ORDER — EMPAGLIFLOZIN, METFORMIN HYDROCHLORIDE 25; 1000 MG/1; MG/1
1 TABLET, EXTENDED RELEASE ORAL DAILY
Qty: 30 TABLET | Refills: 5 | Status: SHIPPED
Start: 2021-03-25 | End: 2021-03-27

## 2021-03-25 NOTE — PROGRESS NOTES
[facility]  Progress Note  DO Ovi Burns  1970    03/25/21    Chief Complaint:   Yamileth Bundy is a 45 y.o. male who is here for diabetes and mental health    HPI:   Had a gap of in his insurance since he is no longer working at his job. He is going through disability process for his mental health condition. He does have legal representation. Has consulted with [facility]) psychiatrist.  The psychiatrist is no longer in the system since he moved. Informed by his  that he needs a statement by behavioral health specialist whether psychologist or psychiatrist confirming his mental health conditions. Since he was out of insurance he did not have his antidiabetic medications for more than 2 months. That is why he thinks the A1c was above 12%    ROS negative for headache, vision changes, chest pain, shortness of breath, abdominal pain, urinary sx, bowel changes. Past medical, surgical, and social history reviewed. Medications and allergies reviewed. No Known Allergies  Prior to Visit Medications    Medication Sig Taking?  Authorizing Provider   Empagliflozin-metFORMIN HCl ER (SYNJARDY XR)  MG TB24 Take 1 tablet by mouth daily Yes Tor Mckenzie, DO   clonazePAM (KLONOPIN) 1 MG tablet TAKE ONE TABLET BY MOUTH THREE TIMES A DAY AS NEEDED FOR ANXIETY Yes Sarai Mckenzie, DO   lisinopril (PRINIVIL;ZESTRIL) 40 MG tablet TAKE ONE TABLET BY MOUTH DAILY Yes Sarai Mckenzie DO   triamterene-hydroCHLOROthiazide (MAXZIDE-25) 37.5-25 MG per tablet Take 1 tablet by mouth once daily Yes Sarai Mckenzie DO   cloNIDine (CATAPRES) 0.3 MG tablet Take 1 tablet by mouth twice daily Yes Nito Knutson, DO   nystatin (MYCOSTATIN) 359589 UNIT/GM powder Apply 3 times daily to the groin Yes Sarai Mckenzie, DO   amLODIPine (NORVASC) 5 MG tablet TAKE ONE TABLET BY MOUTH DAILY Yes Sarai Mckenzie, DO   ARIPiprazole fariba    Ankle abrasion with infection    Wound infection after surgery    Type 2 diabetes mellitus without complication ([facility] [address]    Obesity    Insomnia due to mental disorder    RYAN (generalized anxiety disorder)       Immunization History   Administered Date(s) Administered    COVID-19, Pfizer, PF, 30mcg/0.3mL 03/18/2021    Influenza Virus Vaccine 03/24/2016    Influenza, Paullette Felty, IM, (6 mo and older Fluzone, Flulaval, Fluarix and 3 yrs and older Afluria) 09/12/2019       Past Medical History:   Diagnosis Date    Abdominal wall hernia 2/21/2012    Anemia 4/15/2013    Claustrophobia 4/15/2013    Depression 3/8/2011    Diabetes mellitus, type 2 ([facility] [address] 2/20/2012    Family history of premature coronary artery disease 6/19/2012    Hyperlipidemia 2/27/2012    Hypertension 3/8/2011    Insomnia 2/17/2012    Left atrial dilation 6/19/2012    Left ventricular hypertrophy 6/19/2012    Neuropathy     Obesity 3/8/2011    Obstructive sleep apnea 2/17/2012    uses Cpap sometimes    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    Post traumatic stress disorder 4/15/2013    Right atrial dilation 6/19/2012    Right bundle branch block 5/15/2014    Right ventricular dilation 6/19/2012     Past Surgical History:   Procedure Laterality Date    CARDIOVASCULAR STRESS TEST  May 28 2014    Non-Imaging Stress Test    CIRCUMCISION  03/2017    Due to phimosis: Dr. Ranjit Neal Right 2/1/2016    ankle subtower fusion with bone graft using c-arm     Family History   Problem Relation Age of Onset    Heart Disease Mother     Coronary Art Dis Mother     Heart Attack Mother     Mental Illness Mother      Social History     Socioeconomic History    Marital status:      Spouse name: Katty Clark Number of children: 0    Years of education: Not on file    Highest education level: Not on file   Occupational History    Occupation:    Social Needs    Financial resource strain: Not on file    Food insecurity     Worry: Not on file     Inability: Not on file    Transportation needs     Medical: Not on file     Non-medical: Not on file   Tobacco Use    Smoking status: Former Smoker     Packs/day: 0.25     Years: 5.00     Pack years: 1.25    Smokeless tobacco: Never Used    Tobacco comment: quit smoking about 2004   Substance and Sexual Activity    Alcohol use: No    Drug use: No    Sexual activity: Yes     Partners: Female     Comment:  - [address] Physical activity     Days per week: Not on file     Minutes per session: Not on file    Stress: Not on file   Relationships    Social connections     Talks on phone: Not on file     Gets together: Not on file     Attends Denominational service: Not on file     Active member of club or organization: Not on file     Attends meetings of clubs or organizations: Not on file     Relationship status: Not on file    Intimate partner violence     Fear of current or ex partner: Not on file     Emotionally abused: Not on file     Physically abused: Not on file     Forced sexual activity: Not on file   Other Topics Concern    Not on file   Social History Narrative    Not on file       O: /82   Pulse 105   Temp 97.7 °F (36.5 °C) (Tympanic)   Resp 16   Wt (!) 376 lb 12.8 oz (170.9 kg)   SpO2 95%   BMI 52.55 kg/m²   Physical Exam  GEN: No acute distress,cooperative, well nourished, alert. HEENT: PEERLA, EOMI , normocephalic/atraumatic, external nose appears normal.  External ear is normal.    Neck: soft, supple, no appreciable thyromegaly,mass  CV: No upper extremity edema. Resp:  Breathing comfortably. Psych:normal affect. Neuro: AOx3  Pertinent physical exam findings:     Lab Results   Component Value Date    LABA1C 15.4 03/17/2021     Lab Results   Component Value Date    .3 03/17/2021         ASSESSMENT   Diagnosis Orders   1.  Post traumatic stress disorder External Referral To Psychology   2. Type 2 diabetes mellitus without complication, unspecified whether long term insulin use (HCC)  Empagliflozin-metFORMIN HCl ER (SYNJARDY XR)  MG TB24    Hemoglobin A1C    Comprehensive Metabolic Panel    Lipid Panel    Microalbumin / Creatinine Urine Ratio   3. RYAN (generalized anxiety disorder)  External Referral To Psychology     #1 and #3:  Has seen Dr. Clau Avalos before and it has been about 5 years so patient would be considered new but we do have the computer records for her review. #2: The risks, benefits, potential side effects and barriers to medication use were addressed today. Understanding was acknowledged. Patient asked to follow-up if condition(s) do not improve as anticipated. PLAN      Needs close follow-up after completing the lab work 8 to 12 weeks from now  Time spent on encounter (to include any same day medical record review): 20 minutes  Established E/M: 10-19 (63192), 20-29 (08349), 30-39 (93470), 40-54 (55830)   New E/M: 15-29 (95107), 30-44 (57441), 45-59 (30513), 60-74 (10980)  Telephone E/M: 5-10 (61189), 11-20 (81267), 21-30 (26069)    If applicable, see additional patient information and instructions under \"Patient Instructions. \"    Return if symptoms worsen or fail to improve, for Diabetes in 2-3 months. There are no Patient Instructions on file for this visit. Please note a portion of this chart was generated using dragon dictation software. Although every effort was made to ensure the accuracy of this automated transcription, some errors in transcription may have occurred.

## 2021-03-26 ENCOUNTER — TELEPHONE (OUTPATIENT)
Dept: FAMILY MEDICINE CLINIC | Age: 51
End: 2021-03-26

## 2021-03-26 DIAGNOSIS — E11.9 TYPE 2 DIABETES MELLITUS WITH HEMOGLOBIN A1C GOAL OF 7.0%-8.0% (HCC): Primary | ICD-10-CM

## 2021-03-26 NOTE — TELEPHONE ENCOUNTER
PT wife called stating that the insurance is not covering his medication and pharmacy is saying it will be $500 for it. Not affordable and would like to like at a cheaper option. Empagliflozin-metFORMIN HCl ER (SYNJARDY XR)  MG TB24 [[id number]]     Order Details  Dose: 1 tablet Route: Oral Frequency: DAILY   Dispense Quantity: 30 tablet Refills: 5          Sig: Take 1 tablet by mouth daily         Start Date: 03/25/21 End Date: --   Written Date: 03/25/21 Expiration Date: 03/25/22         Requesting a call back with cheaper options. Please advise, thank you.

## 2021-03-27 RX ORDER — GLIPIZIDE 10 MG/1
10 TABLET, FILM COATED, EXTENDED RELEASE ORAL DAILY
Qty: 30 TABLET | Refills: 5 | Status: SHIPPED | OUTPATIENT
Start: 2021-03-27 | End: 2021-08-04 | Stop reason: SDUPTHER

## 2021-03-27 RX ORDER — METFORMIN HYDROCHLORIDE 500 MG/1
1000 TABLET, EXTENDED RELEASE ORAL
Qty: 60 TABLET | Refills: 5 | Status: SHIPPED | OUTPATIENT
Start: 2021-03-27 | End: 2021-09-21

## 2021-03-27 NOTE — TELEPHONE ENCOUNTER
Since med too expensive, I reviewed drug formulary. I believe following are inexpensive or potentially free for Ovi. Rx for antidiabetic pills sent to pharmacy. Deb Bush would take ER Metformin BID  Deb Bush would take daily glipizide pill with either morning metformin or evening metformin. Check labwork in 8 to 12 weeks (orders are in). Diagnosis Orders   1.  Type 2 diabetes mellitus with hemoglobin A1c goal of 7.0%-8.0% ([facility])  metFORMIN (GLUCOPHAGE-XR) 500 MG extended release tablet    glipiZIDE (GLUCOTROL XL) 10 MG extended release tablet   '

## 2021-03-31 ENCOUNTER — TELEPHONE (OUTPATIENT)
Dept: FAMILY MEDICINE CLINIC | Age: 51
End: 2021-03-31

## 2021-03-31 NOTE — TELEPHONE ENCOUNTER
----- Message from [facility] sent at 3/31/2021  1:11 PM EDT -----  Subject: Message to Provider    QUESTIONS  Information for Provider? Patient has a referral and it says its being   sent to his old job to the [address] [place] [address] [address] and she said she does not want   anything to be sent to his work. Patient would like to speak with the   office about this.   ---------------------------------------------------------------------------  --------------  Partnered  What is the best way for the office to contact you? OK to leave message on   voicemail  Preferred Call Back Phone Number? [phone]  ---------------------------------------------------------------------------  --------------  SCRIPT ANSWERS  Relationship to Patient? Other  Representative Name? Sunita Monzon  Is the Representative on the appropriate HIPAA document in Epic?  Yes

## 2021-03-31 NOTE — TELEPHONE ENCOUNTER
10 24 16 LESS BUT STILL POM RECOM RETX THEN REEVA 3 MONTHS. Advised no information will be faxed to ex employer.

## 2021-04-08 ENCOUNTER — IMMUNIZATION (OUTPATIENT)
Dept: FAMILY MEDICINE CLINIC | Age: 51
End: 2021-04-08
Payer: COMMERCIAL

## 2021-04-08 PROCEDURE — 91300 COVID-19, PFIZER VACCINE 30MCG/0.3ML DOSE: CPT | Performed by: FAMILY MEDICINE

## 2021-04-08 PROCEDURE — 0002A COVID-19, PFIZER VACCINE 30MCG/0.3ML DOSE: CPT | Performed by: FAMILY MEDICINE

## 2021-04-12 ENCOUNTER — VIRTUAL VISIT (OUTPATIENT)
Dept: PSYCHOLOGY | Age: 51
End: 2021-04-12
Payer: COMMERCIAL

## 2021-04-12 DIAGNOSIS — F43.10 PTSD (POST-TRAUMATIC STRESS DISORDER): Primary | ICD-10-CM

## 2021-04-12 PROCEDURE — 90791 PSYCH DIAGNOSTIC EVALUATION: CPT | Performed by: PSYCHOLOGIST

## 2021-04-12 NOTE — PROGRESS NOTES
Behavioral Health Consultation  Payam Corrigan Psy.D. Psychologist  4/12/2021  11-11:30 AM      Time spent with Patient: 30 minutes  This is patient's first [facility] appointment since his last episode of care, which consisted of one visit in 2016. Reason for Consult: PTSD  Referring Provider: Aron Sutherland DO  [address] [facility]    TELEHEALTH VISIT -- Audio/Visual (During [id number] public health emergency)    Pt provided informed consent for the behavioral health program. Discussed with patient the model of service, including the limits of confidentiality (e.g., abuse reporting, suicide intervention) and the nature of the [facility] approach (e.g., focused, targeted interventions; open communication with PCP). Pt indicated understanding. Feedback given to PCP. }  Pursuant to the emergency declaration under the [address] [place] [address] [facility] authority and the [employer] and [employer] Act, this Virtual Visit was conducted, with patient's consent, to reduce the patient's risk of exposure to COVID-19 and provide continuity of care for an established patient. Services were provided through a video synchronous discussion virtually to substitute for in-person clinic visit. Pt gave verbal informed consent to participate in telehealth services. Conducted a risk-benefit analysis and determined that the patient's presenting problems are consistent with the use of telepsychology. Determined that the patient has sufficient knowledge and skills in the use of technology enabling them to adequately benefit from telepsychology. It was determined that this patient was able to be properly treated without an in-person session. Patient verified that they were currently located at the address that was provided during registration.     Verified the following information:  Patient's identification: Yes  Patient location: [address] [facility] [address]  Patient's call back number: [phone]  Patient's emergency contact's name and number, as well as permission to contact them if needed:  Extended Emergency Contact Information  Primary Emergency Contact: Laverne Vasquez  Address: [address] Phone: [phone]  Mobile Phone: [phone]  Relation: Spouse    Provider location: [place], [place]      S:    Love-Work-Play     -Living Situation / Kaia Boers / Intimate Relationships - Lives with wife. -Friendships / Family of Origin - Feels supported by wife and brother.    -Work / Huel Jay from his job for using the bathroom too frequently, despite having a doctor's note. Feels worse since he became unemployed. Uses FMLA often d/t somatic symptoms of PTSD. Applied for disability.      -Fun / Talya Sarah - Enjoys spending time with his dog      Health Behaviors    Numerous health issues. Diabetes. Broke foot, now has screw in it. Swells regularly. More forgetful for years.    -Sleep - Poor sleep. Has sleep apnea, but machine doesn't help him sleep.     -Exercise - Tries to walk for 5-10 minutes but he gets very tired. Wants to do it for longer but his body hurts.    -Nutrition / Eating History - Tries to keep a balanced diet. Wife is a nurse, helps with that.    -Risk Assessment - No. Sometimes wishes he had a way to stop the pain    -Mental Health / Trauma History - PTSD. Feeling very depressed. Deep anxiety all the time. Has a feeling that something bad will happen to him. Scared to drive, of elevator, sirens. 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 He has learned from therapists how to practice breathing, how to think more positively. Breathing exercises help him avoid calling 911 when he has trouble breathing. Still shaky, feels sad. Pt has benefited from past psychotherapy.   He has stopped attending treatment once he felt better. Then symptoms eventually returned. He has resisted therapists' recommendation that he attend group therapy, as he already feels very sad and does not think he could handle hearing about other people's struggles. Social History     Tobacco Use    Smoking status: Former Smoker     Packs/day: 0.25     Years: 5.00     Pack years: 1.25    Smokeless tobacco: Never Used    Tobacco comment: quit smoking about 2004   Substance Use Topics    Alcohol use: No      Illicit drugs:   Social History     Substance and Sexual Activity   Drug Use No        O:  Interventions:  -Contextual assessment  -Supportive techniques  -Discussed patient's treatment history. Highlighted his tendency to switch providers frequently. Due to the chronic nature of his PTSD symptoms, encouraged him to reconnect with a therapist with whom he felt comfortable and to see that provider on an ongoing basis.   -Recommended he consider reading The Body Keeps the Score by Meet Menjivar  -Discussed his plans to apply for disability      A:  MSE:  Appearance: good hygiene  and appropriate attire  Attitude: cooperative, friendly and mild to moderate distress  Consciousness: alert  Orientation: oriented to person, place, time, general circumstance  Memory: recent and remote memory intact  Attention/Concentration: intact during session  Psychomotor Activity: normal  Eye Contact: normal  Speech: normal rate and volume, well-articulated  Mood: Anxious, dysphoric  Affect: congruent  Perception: within normal limits  Thought Content: within normal limits  Thought Process: logical, coherent and goal-directed  Insight: good  Judgment: intact  Ability to understand instructions: Yes  Ability to respond meaningfully: Yes  Morbid Ideation: no   Suicide Assessment: no suicidal ideation, plan, or intent  Homicidal Ideation: no    PHQ Scores 2/8/2018 9/19/2016 4/13/2016   PHQ2 Score 3 6 -   PHQ9 Score 16 23 14     Interpretation of Total Score Depression Severity: 1-4 = Minimal depression, 5-9 = Mild depression, 10-14 = Moderate depression, 15-19 = Moderately severe depression, 20-27 = Severe depression    Diagnosis:    1. PTSD (post-traumatic stress disorder)        Patient Active Problem List   Diagnosis    Hypertension    Moderate episode of recurrent major depressive disorder (HCC)    Right leg pain    Eczema    Right lumbar radiculopathy    Urinary urgency    Obstructive sleep apnea    Memory loss    Abdominal mass    Diabetes mellitus, type 2 ([facility]    Abdominal wall hernia    Hyperlipidemia    Chest pain    Left ventricular hypertrophy    Left atrial dilation    Right atrial dilation    Right ventricular dilation    Syncope    Family history of premature coronary artery disease    Post traumatic stress disorder    Claustrophobia    Anemia    Right foot pain    Left foot pain    Right bundle branch block    Right ankle pain    BMI 50.0-59.9, adult ([facility])    Type 2 diabetes mellitus with hemoglobin A1c goal of 7.0%-8.0% ([facility])    Ankle arthritis    Congenital tarsal coalition    Osteoarthritis of subtalar joint    Osteoarthritis of right subtalar joint    Tarsal coalitions    Ankle abrasion with infection    Wound infection after surgery    Type 2 diabetes mellitus without complication ([facility]    Obesity    Insomnia due to mental disorder    RYAN (generalized anxiety disorder)         Plan:  Pt interventions:  Established rapport, [place]-setting to identify pt's primary goals for [facility] visit / overall health, Supportive techniques, Provided Psychoeducation re: Effective PTSD treatment, Emphasized self-care as important for managing overall health, Cognitive strategies to target balanced thinking, Completed risk evaluation and Provided pt book recommendation. Pt Behavioral Change Plan:  Pt set the following goals:  1.  Consider reading The Body Keeps the Score by Eliu Vilchis    Pt scheduled F/U virtual visit in 2 weeks.

## 2021-04-24 DIAGNOSIS — I10 ESSENTIAL HYPERTENSION: Chronic | ICD-10-CM

## 2021-04-27 RX ORDER — AMLODIPINE BESYLATE 5 MG/1
TABLET ORAL
Qty: 30 TABLET | Refills: 5 | Status: SHIPPED | OUTPATIENT
Start: 2021-04-27 | End: 2022-06-08 | Stop reason: SDUPTHER

## 2021-04-28 ENCOUNTER — VIRTUAL VISIT (OUTPATIENT)
Dept: PSYCHOLOGY | Age: 51
End: 2021-04-28
Payer: COMMERCIAL

## 2021-04-28 DIAGNOSIS — F43.10 PTSD (POST-TRAUMATIC STRESS DISORDER): Primary | ICD-10-CM

## 2021-04-28 PROCEDURE — 90832 PSYTX W PT 30 MINUTES: CPT | Performed by: PSYCHOLOGIST

## 2021-04-28 NOTE — PROGRESS NOTES
Behavioral Health Consultation  Jessica Schneider Psy.D. Psychologist  4/28/2021  2-2:30 PM      Time spent with Patient: 30 minutes  This is patient's second [facility] appointment since his last episode of care, which consisted of one visit in 2016. Reason for Consult: PTSD  Referring Provider: DO Jayla Toure [facility]    TELEHEALTH VISIT -- Audio/Visual (During [id number] public health emergency)  }  Pursuant to the emergency declaration under the [facility] waiver authority and the [address] and [employer] General Act, this Virtual Visit was conducted, with patient's consent, to reduce the patient's risk of exposure to COVID-19 and provide continuity of care for an established patient. Services were provided through a video synchronous discussion virtually to substitute for in-person clinic visit. Pt gave verbal informed consent to participate in telehealth services. Conducted a risk-benefit analysis and determined that the patient's presenting problems are consistent with the use of telepsychology. Determined that the patient has sufficient knowledge and skills in the use of technology enabling them to adequately benefit from telepsychology. It was determined that this patient was able to be properly treated without an in-person session. Patient verified that they were currently located at the address that was provided during registration.     Verified the following information:  Patient's identification: Yes  Patient location: [address] [place] [address]  Patient's call back number: [phone]  Patient's emergency contact's name and number, as well as permission to contact them if needed:  Extended Emergency Contact Information  Primary Emergency Contact: Laverne Vasquez  Address: [address] Phone: [phone] Mobile Phone: [phone]  Relation: Spouse    Provider location: [place]:  Pt reported that he got the workbook for The Body Keeps the Score. Found it helpful initially but the traumatic memories quickly returned. Trying to stay positive, to focus on his blessings, to never give up. Wants desperately to move forward with his life and to stop having such a strong physical reaction to painful memories. Has anxiety attacks regularly (every 2-3 days) in which his heart races, SOB, etc. Sometimes feels like he might need to call 911. Past therapists have taught him to control his breathing, which has been helpful. O:  Interventions:  -Supportive techniques  -Discussed his reaction to the workbook  -Processed hhis ongoing PTSD symptoms. Recommended EMDR treatment in the community. I will help him find a suitable therapist.  -Recommended meditation before bed to manage distress that interferes with sleep       A:  MSE:  Appearance: good hygiene  and appropriate attire  Attitude: cooperative, friendly and mild to moderate distress  Consciousness: alert  Orientation: oriented to person, place, time, general circumstance  Memory: recent and remote memory intact  Attention/Concentration: intact during session  Psychomotor Activity: normal  Eye Contact: normal  Speech: normal rate and volume, well-articulated  Mood: anxious, mildly dysphoric  Affect: congruent  Perception: within normal limits  Thought Content: within normal limits  Thought Process: logical, coherent and goal-directed  Insight: good  Judgment: intact  Ability to understand instructions: Yes  Ability to respond meaningfully: Yes  Morbid Ideation: no   Suicide Assessment: no suicidal ideation, plan, or intent. Appropriate for outpatient/telehealth care at this time.   Homicidal Ideation: no    PHQ Scores 2/8/2018 9/19/2016 4/13/2016   PHQ2 Score 3 6 -   PHQ9 Score 16 23 14     Interpretation of Total Score Depression Severity: 1-4 =

## 2021-04-28 NOTE — TELEPHONE ENCOUNTER
Logged request in MRO binder. Scanned request in Epic and faxed records request to [id number] on 4/28/2021.

## 2021-05-03 ENCOUNTER — TELEPHONE (OUTPATIENT)
Dept: PSYCHOLOGY | Age: 51
End: 2021-05-03

## 2021-05-03 NOTE — TELEPHONE ENCOUNTER
I called patient and left him a voice message with information about a therapist in the community named Dee Huff who does EMDR trauma treatment and may be able to accept his insurance. She is with [facility] ([email or web address]).

## 2021-05-05 DIAGNOSIS — E11.9 TYPE 2 DIABETES MELLITUS WITHOUT COMPLICATION, UNSPECIFIED WHETHER LONG TERM INSULIN USE (HCC): ICD-10-CM

## 2021-05-05 LAB
A/G RATIO: 1.4 (ref 1.1–2.2)
ALBUMIN SERPL-MCNC: 4 G/DL (ref 3.4–5)
ALP BLD-CCNC: 67 U/L (ref 40–129)
ALT SERPL-CCNC: 111 U/L (ref 10–40)
ANION GAP SERPL CALCULATED.3IONS-SCNC: 13 MMOL/L (ref 3–16)
AST SERPL-CCNC: 48 U/L (ref 15–37)
BILIRUB SERPL-MCNC: <0.2 MG/DL (ref 0–1)
BUN BLDV-MCNC: 20 MG/DL (ref 7–20)
CALCIUM SERPL-MCNC: 8.7 MG/DL (ref 8.3–10.6)
CHLORIDE BLD-SCNC: 101 MMOL/L (ref 99–110)
CHOLESTEROL, TOTAL: 149 MG/DL (ref 0–199)
CO2: 26 MMOL/L (ref 21–32)
CREAT SERPL-MCNC: 0.6 MG/DL (ref 0.9–1.3)
CREATININE URINE: 90.2 MG/DL (ref 39–259)
ESTIMATED AVERAGE GLUCOSE: 309.2 MG/DL
GFR AFRICAN AMERICAN: >60
GFR NON-AFRICAN AMERICAN: >60
GLOBULIN: 2.9 G/DL
GLUCOSE BLD-MCNC: 314 MG/DL (ref 70–99)
HBA1C MFR BLD: 12.4 %
HDLC SERPL-MCNC: 55 MG/DL (ref 40–60)
LDL CHOLESTEROL CALCULATED: 74 MG/DL
MICROALBUMIN UR-MCNC: <1.2 MG/DL
MICROALBUMIN/CREAT UR-RTO: NORMAL MG/G (ref 0–30)
POTASSIUM SERPL-SCNC: 4.4 MMOL/L (ref 3.5–5.1)
SODIUM BLD-SCNC: 140 MMOL/L (ref 136–145)
TOTAL PROTEIN: 6.9 G/DL (ref 6.4–8.2)
TRIGL SERPL-MCNC: 98 MG/DL (ref 0–150)
VLDLC SERPL CALC-MCNC: 20 MG/DL

## 2021-05-10 DIAGNOSIS — F43.22 ADJUSTMENT DISORDER WITH ANXIETY: ICD-10-CM

## 2021-05-12 RX ORDER — CLONAZEPAM 1 MG/1
1 TABLET ORAL 3 TIMES DAILY PRN
Qty: 90 TABLET | Refills: 0 | Status: SHIPPED | OUTPATIENT
Start: 2021-05-12 | End: 2021-05-17 | Stop reason: SDUPTHER

## 2021-05-16 DIAGNOSIS — E11.9 TYPE 2 DIABETES MELLITUS WITHOUT COMPLICATION, UNSPECIFIED WHETHER LONG TERM INSULIN USE (HCC): ICD-10-CM

## 2021-05-16 DIAGNOSIS — F39 MOOD DISORDER (HCC): ICD-10-CM

## 2021-05-16 DIAGNOSIS — F43.10 POST TRAUMATIC STRESS DISORDER: ICD-10-CM

## 2021-05-17 ENCOUNTER — TELEPHONE (OUTPATIENT)
Dept: FAMILY MEDICINE CLINIC | Age: 51
End: 2021-05-17

## 2021-05-17 ENCOUNTER — VIRTUAL VISIT (OUTPATIENT)
Dept: FAMILY MEDICINE CLINIC | Age: 51
End: 2021-05-17
Payer: COMMERCIAL

## 2021-05-17 DIAGNOSIS — L02.222 BOIL, BACK: Primary | ICD-10-CM

## 2021-05-17 DIAGNOSIS — F43.22 ADJUSTMENT DISORDER WITH ANXIETY: ICD-10-CM

## 2021-05-17 DIAGNOSIS — Z79.4 TYPE 2 DIABETES MELLITUS WITHOUT COMPLICATION, WITH LONG-TERM CURRENT USE OF INSULIN (HCC): ICD-10-CM

## 2021-05-17 DIAGNOSIS — E11.9 TYPE 2 DIABETES MELLITUS WITHOUT COMPLICATION, WITH LONG-TERM CURRENT USE OF INSULIN (HCC): ICD-10-CM

## 2021-05-17 PROCEDURE — 99442 PR PHYS/QHP TELEPHONE EVALUATION 11-20 MIN: CPT | Performed by: FAMILY MEDICINE

## 2021-05-17 RX ORDER — ARIPIPRAZOLE 10 MG/1
TABLET ORAL
Qty: 30 TABLET | Refills: 5 | Status: SHIPPED | OUTPATIENT
Start: 2021-05-17 | End: 2021-11-29

## 2021-05-17 RX ORDER — DULAGLUTIDE 0.75 MG/.5ML
0.75 INJECTION, SOLUTION SUBCUTANEOUS WEEKLY
Status: CANCELLED | OUTPATIENT
Start: 2021-05-17

## 2021-05-17 RX ORDER — SULFAMETHOXAZOLE AND TRIMETHOPRIM 800; 160 MG/1; MG/1
1 TABLET ORAL 2 TIMES DAILY
Qty: 14 TABLET | Refills: 0 | Status: SHIPPED | OUTPATIENT
Start: 2021-05-17 | End: 2021-05-24

## 2021-05-17 RX ORDER — TAMSULOSIN HYDROCHLORIDE 0.4 MG/1
CAPSULE ORAL
Qty: 30 CAPSULE | Refills: 5 | Status: SHIPPED | OUTPATIENT
Start: 2021-05-17 | End: 2021-11-29

## 2021-05-17 RX ORDER — SIMVASTATIN 20 MG
TABLET ORAL
Qty: 30 TABLET | Refills: 5 | Status: SHIPPED | OUTPATIENT
Start: 2021-05-17 | End: 2021-11-29

## 2021-05-17 RX ORDER — CLONAZEPAM 1 MG/1
1 TABLET ORAL 3 TIMES DAILY PRN
Qty: 90 TABLET | Refills: 0 | Status: SHIPPED | OUTPATIENT
Start: 2021-06-11 | End: 2021-08-04 | Stop reason: SDUPTHER

## 2021-05-17 SDOH — ECONOMIC STABILITY: FOOD INSECURITY: WITHIN THE PAST 12 MONTHS, YOU WORRIED THAT YOUR FOOD WOULD RUN OUT BEFORE YOU GOT MONEY TO BUY MORE.: NEVER TRUE

## 2021-05-17 SDOH — ECONOMIC STABILITY: TRANSPORTATION INSECURITY
IN THE PAST 12 MONTHS, HAS LACK OF TRANSPORTATION KEPT YOU FROM MEETINGS, WORK, OR FROM GETTING THINGS NEEDED FOR DAILY LIVING?: NO

## 2021-05-17 NOTE — PROGRESS NOTES
[facility]  TELEPHONE Progress Note  Jose David Hill DO      The risks and benefits of converting to a virtual visit were discussed in light of the current infectious disease epidemic. Patient also understood that insurance coverage and co-pays are up to their individual insurance plans. Patient identification was verified at the start of the visit. Sonal Matthews  1970    05/18/21    Patient location: Home address on file  Provider location: [de-identified] home    Chief Complaint:   Sonal Matthews is a 45 y.o. patient who is here for diabetes and anxiety and boil on back        HPI:   Requests clonazepam.  This is helpful to his anxiety. Denies any daytime drowsiness. Completed lab work recently. Lab Results   Component Value Date    LABA1C 12.4 05/05/2021     Lab Results   Component Value Date    .2 05/05/2021     A1c continues to be in the elevated range. Patient tells me he takes his pills as directed. Kelsy Morocho tells me that has been dealing with a boil on his back for more than 1 week. He has had these before. Requests medication. Denies any fever. ROS negative for headache, vision changes, chest pain, shortness of breath, abdominal pain, urinary sx, bowel changes. Past medical, surgical, and social history reviewed. Medications and allergies reviewed. No Known Allergies  Prior to Visit Medications    Medication Sig Taking? Authorizing Provider   Dulaglutide (TRULICITY) 4.38 MA/6.1SD SOPN Inject 0.75 mg into the skin once a week Yes Alphonsoynn Reasons Bingcang, DO   sulfamethoxazole-trimethoprim (BACTRIM DS;SEPTRA DS) 800-160 MG per tablet Take 1 tablet by mouth 2 times daily for 7 days Yes Carlynn Reasons Bingcang, DO   mupirocin (BACTROBAN) 2 % ointment Apply 3 times daily. Yes Carlynn Reasons Bingcang, DO   clonazePAM (KLONOPIN) 1 MG tablet Take 1 tablet by mouth 3 times daily as needed for Anxiety for up to 90 doses.  Yes Carlynn Reasons Fredigcang, DO   amLODIPine (NORVASC) 5 MG tablet TAKE ONE TABLET BY MOUTH DAILY Yes Kyle Mckenzie DO   metFORMIN (GLUCOPHAGE-XR) 500 MG extended release tablet Take 2 tablets by mouth daily (with breakfast) Yes Kyle Mckenzie DO   glipiZIDE (GLUCOTROL XL) 10 MG extended release tablet Take 1 tablet by mouth daily Yes Kyle Mckenzie, DO   lisinopril (PRINIVIL;ZESTRIL) 40 MG tablet TAKE ONE TABLET BY MOUTH DAILY Yes Kyle Mckenzie DO   triamterene-hydroCHLOROthiazide (MAXZIDE-25) 37.5-25 MG per tablet Take 1 tablet by mouth once daily Yes Kyle Mckenzie DO   cloNIDine (CATAPRES) 0.3 MG tablet Take 1 tablet by mouth twice daily Yes Kyle Mckenzie DO   nystatin (MYCOSTATIN) 793704 UNIT/GM powder Apply 3 times daily to the groin Yes Kyle Mckenzie DO   COCONUT OIL PO Take by mouth Yes Historical Provider, MD   Cinnamon 500 MG CAPS Take by mouth Yes Historical Provider, MD MALDONADO OMEGA-3 KRILL OIL PO Take by mouth Yes Historical Provider, MD   aspirin EC 81 MG EC tablet Take 81 mg by mouth daily. Yes Historical Provider, MD   ARIPiprazole (ABILIFY) 10 MG tablet TAKE ONE TABLET BY MOUTH DAILY  Kyle Mckenzie DO   simvastatin (ZOCOR) 20 MG tablet TAKE ONE TABLET BY MOUTH EVERY EVENING  Tor Mckenzie DO   tamsulosin (FLOMAX) 0.4 MG capsule TAKE ONE CAPSULE BY MOUTH ONCE NIGHTLY  Kyle Mckenzie DO          Patient-Reported Vitals 10/30/2020   Patient-Reported Weight 375 lb   Patient-Reported Height 5ft 11 in      There were no vitals filed for this visit.    Wt Readings from Last 3 Encounters:   03/25/21 (!) 376 lb 12.8 oz (170.9 kg)   01/06/21 (!) 374 lb 3.2 oz (169.7 kg)   12/02/20 (!) 368 lb (166.9 kg)     BP Readings from Last 3 Encounters:   03/25/21 137/82   01/06/21 134/73   12/02/20 (!) 158/84       Patient Active Problem List   Diagnosis    Hypertension    Moderate episode of recurrent major depressive disorder (HCC)    Right leg pain    Eczema    bundle branch block 5/15/2014    Right ventricular dilation 6/19/2012     Past Surgical History:   Procedure Laterality Date    CARDIOVASCULAR STRESS TEST  May 28 2014    Non-Imaging Stress Test    CIRCUMCISION  03/2017    Due to phimosis: Dr. Trell Nichols Right 2/1/2016    ankle subtower fusion with bone graft using c-arm     Family History   Problem Relation Age of Onset    Heart Disease Mother     Coronary Art Dis Mother     Heart Attack Mother     Mental Illness Mother      Social History     Socioeconomic History    Marital status:      Spouse name: Mariaa Hidalgo Number of children: 0    Years of education: Not on file    Highest education level: Not on file   Occupational History    Occupation:    Tobacco Use    Smoking status: Former Smoker     Packs/day: 0.25     Years: 5.00     Pack years: 1.25    Smokeless tobacco: Never Used    Tobacco comment: quit smoking about 2004   Substance and Sexual Activity    Alcohol use: No    Drug use: No    Sexual activity: Yes     Partners: Female     Comment:  - [address]   Other Topics Concern    Not on file   Social History Narrative    Not on file     Social Determinants of Health     Financial Resource Strain: Low Risk     Difficulty of Paying Living Expenses: Not very hard   Food Insecurity: No Food Insecurity    Worried About Running Out of Food in the Last Year: Never true    Sylvia of Food in the Last Year: Never true   Transportation Needs: No Transportation Needs    Lack of Transportation (Medical): No    Lack of Transportation (Non-Medical):  No   Physical Activity:     Days of Exercise per Week:     Minutes of Exercise per Session:    Stress:     Feeling of Stress :    Social Connections:     Frequency of Communication with Friends and Family:     Frequency of Social Gatherings with Friends and Family:     Attends Zoroastrianism Services:     Active Member of Clubs or Organizations:     Attends Club or Organization Meetings:     Marital Status:    Intimate Partner Violence:     Fear of Current or Ex-Partner:     Emotionally Abused:     Physically Abused:     Sexually Abused:            ASSESSMENT   Diagnosis Orders   1. Boil, back  sulfamethoxazole-trimethoprim (BACTRIM DS;SEPTRA DS) 800-160 MG per tablet    mupirocin (BACTROBAN) 2 % ointment   2. Adjustment disorder with anxiety  clonazePAM (KLONOPIN) 1 MG tablet   3. Type 2 diabetes mellitus without complication, with long-term current use of insulin (HCC)  Dulaglutide (TRULICITY) 6.29 AJ/7.3SD SOPN     #1: The risks, benefits, potential side effects and barriers to medication use were addressed today. Understanding was acknowledged. Patient asked to follow-up if condition(s) do not improve as anticipated. Understands return to clinic if no significant improvement and 5 to 7 days for likely incision and drainage. #2: The current medical regimen is effective;  continue present plan and medications. Pt aware of need for every 3 month medication followup appointments, and that medication refills for benzodiazepines, narcotics and/or stimulants will only be given at appointment. The risks, benefits, potential side effects and barriers to medication use were addressed today. Understanding was acknowledged. Patient asked to follow-up if condition(s) do not improve as anticipated. #3:  Needs better control. Unfortunately during the phone call was not able to talk about details. We will contact him on the telephone encounter. We will wanted to see if once a week GLP-1 injection or an SGLT 2 is on his drug formulary.       PLAN          Time spent on encounter (to include any same day medical record review): 18 minutes  Established E/M: 10-19 (90852), 20-29 (10566), 30-39 (88007), 40-54 (25943)   New E/M: 15-29 (63339), 30-44 (95323), 45-59 (45379), 60-74 (77834)  Telephone E/M: 5-10 (47648), 11-20 [phone]), 21-30 (44888)    If applicable, see additional patient information and instructions under \"Patient Instructions. \"    Return in about 3 months (around 8/17/2021) for Anxiety, Diabetes. There are no Patient Instructions on file for this visit. Please note a portion of this chart was generated using dragon dictation software. Although every effort was made to ensure the accuracy of this automated transcription, some errors in transcription may have occurred. Pursuant to the emergency declaration under the [address] [place] [address], [facility] waiver authority and the [employer] Act, this Virtual  Visit was conducted, with patient's consent, to reduce the patient's risk of exposure to COVID-19 and provide continuity of care for an established patient. Services were provided through a video synchronous discussion virtually to substitute for in-person clinic visit. Patient was instructed that the AVS is available on My Chart or was emailed to the patient if not on My Chart. Lab orders were emailed to patient if they do not use a [id number] lab. Any work notes were sent to patient through My Chart or email.

## 2021-05-18 ENCOUNTER — TELEPHONE (OUTPATIENT)
Dept: ORTHOPEDIC SURGERY | Age: 51
End: 2021-05-18

## 2021-05-18 RX ORDER — DULAGLUTIDE 0.75 MG/.5ML
0.75 INJECTION, SOLUTION SUBCUTANEOUS WEEKLY
Qty: 4 PEN | Refills: 2 | Status: SHIPPED | OUTPATIENT
Start: 2021-05-18 | End: 2021-11-01 | Stop reason: ALTCHOICE

## 2021-05-18 NOTE — TELEPHONE ENCOUNTER
EMR still showing \"Payer Waiting response. \"   Can clinic staff call this in? Then let patient or wife call Jessi Lis to  check cost with Jessi Lis for the once a week Trulicity injection. Go to [email or web address]. com to see how to use it.     Dulaglutide (TRULICITY) 8.51 WB/2.3KN SOPN 4 pen 2 5/18/2021     Sig - Route: Inject 0.75 mg into the skin once a week - Subcutaneous    Sent to pharmacy as: Trulicity 8.34 DJ/0.9NK Subcutaneous Solution Pen-injector (Dulaglutide)    E-Prescribing Status: Receipt confirmed by pharmacy (5/18/2021  7:50 AM EDT)    Prior authorization: Payer Waiting for Response            If expensive, they can call back to see if other

## 2021-05-18 NOTE — TELEPHONE ENCOUNTER
Please call patient. Unfortunately we were not able to review medication options at the telehealth call yesterday. I apologize for that. With the A1c still in the 12% range I advise medication to better control the blood sugar. Is patient open to taking once a week injection to help with the weight loss and blood sugar control? We will try to find medication on his drug formulary that is free or affordable for him. We will await his response.

## 2021-05-18 NOTE — TELEPHONE ENCOUNTER
Per Geetha Friend, pt would like to try a weekly injection medication for wt loss and blood sugar control  Please advise

## 2021-05-20 ENCOUNTER — VIRTUAL VISIT (OUTPATIENT)
Dept: PSYCHOLOGY | Age: 51
End: 2021-05-20
Payer: COMMERCIAL

## 2021-05-20 DIAGNOSIS — F43.10 PTSD (POST-TRAUMATIC STRESS DISORDER): Primary | ICD-10-CM

## 2021-05-20 PROCEDURE — 90832 PSYTX W PT 30 MINUTES: CPT | Performed by: PSYCHOLOGIST

## 2021-05-20 NOTE — PROGRESS NOTES
Behavioral Health Consultation  Payam Corrigan Psy.D. Psychologist  5/20/2021  12-12:30 PM      Time spent with Patient: 30 minutes  This is patient's third [facility] appointment since his last episode of care, which consisted of one visit in 2016. Reason for Consult: PTSD  Referring Provider: Aron Sutherland DO  [facility]    TELEHEALTH VISIT -- Audio/Visual (During YEAGB-09 public health emergency)  }  Pursuant to the emergency declaration under the [facility], [facility] waiver authority and the [employer] Act, this Virtual Visit was conducted, with patient's consent, to reduce the patient's risk of exposure to COVID-19 and provide continuity of care for an established patient. Services were provided through a video synchronous discussion virtually to substitute for in-person clinic visit. Pt gave verbal informed consent to participate in telehealth services. Conducted a risk-benefit analysis and determined that the patient's presenting problems are consistent with the use of telepsychology. Determined that the patient has sufficient knowledge and skills in the use of technology enabling them to adequately benefit from telepsychology. It was determined that this patient was able to be properly treated without an in-person session. Patient verified that they were currently located at the address that was provided during registration.     Verified the following information:  Patient's identification: Yes  Patient location: [address] [place] [address]  Patient's call back number: [phone]  Patient's emergency contact's name and number, as well as permission to contact them if needed:  Extended Emergency Contact Information  Primary Emergency Contact: Laverne Vasquez  Address: [address] [place] [address] Phone: [phone] Mobile Phone: [phone]  Relation: Spouse    Provider location: [place], [address]:  Pt reported that he's feeling a little better today. Slept better last night, no anxiety attacks so far today. Otherwise, feeling the same. Forgetful, hard to accomplish things around the house. Still triggered every night. PCP increased Abilify dose yesterday. Pt called to schedule an appointment to do EMDR therapy with Sabine Celaya at [address] [place] [address]. He is on a waiting list.    Pt requested a letter from this writer documenting his diagnosis and treatment for his disability case. O:  Interventions:  -Supportive techniques  -Processed his experiences an ongoing struggle with PTSD symptoms  -Reinforced his positive attitude and his ongoing efforts towards self-care  -Discussed treatment plan  -Discussed the letter he requested for his disability case      A:  MSE:  Appearance: good hygiene  and appropriate attire  Attitude: cooperative, friendly and mild distress  Consciousness: alert  Orientation: oriented to person, place, time, general circumstance  Memory: recent and remote memory intact  Attention/Concentration: intact during session  Psychomotor Activity: normal  Eye Contact: normal  Speech: normal rate and volume, well-articulated  Mood: anxious, mildly dysphoric  Affect: congruent  Perception: within normal limits  Thought Content: within normal limits  Thought Process: logical, coherent and goal-directed  Insight: good  Judgment: intact  Ability to understand instructions: Yes  Ability to respond meaningfully: Yes  Morbid Ideation: no   Suicide Assessment: no suicidal ideation, plan, or intent. Appropriate for outpatient/telehealth care at this time.   Homicidal Ideation: no    PHQ Scores 2/8/2018 9/19/2016 4/13/2016   PHQ2 Score 3 6 -   PHQ9 Score 16 23 14     Interpretation of Total Score Depression Severity: 1-4 = Minimal depression, 5-9 = Mild depression, 10-14 = Moderate depression, 15-19 = Moderately severe depression, 20-27 = Severe depression    Diagnosis:    1. PTSD (post-traumatic stress disorder)        Patient Active Problem List   Diagnosis    Hypertension    Moderate episode of recurrent major depressive disorder (HCC)    Right leg pain    Eczema    Right lumbar radiculopathy    Urinary urgency    Obstructive sleep apnea    Memory loss    Abdominal mass    Diabetes mellitus, type 2 ([facility])    Abdominal wall hernia    Hyperlipidemia    Chest pain    Left ventricular hypertrophy    Left atrial dilation    Right atrial dilation    Right ventricular dilation    Syncope    Family history of premature coronary artery disease    Post traumatic stress disorder    Claustrophobia    Anemia    Right foot pain    Left foot pain    Right bundle branch block    Right ankle pain    BMI 50.0-59.9, adult ([facility])    Type 2 diabetes mellitus with hemoglobin A1c goal of 7.0%-8.0% ([facility])    Ankle arthritis    Congenital tarsal coalition    Osteoarthritis of subtalar joint    Osteoarthritis of right subtalar joint    Tarsal coalitions    Ankle abrasion with infection    Wound infection after surgery    Type 2 diabetes mellitus without complication ([facility])    Obesity    Insomnia due to mental disorder    RYAN (generalized anxiety disorder)         Plan:  Pt interventions:  Supportive techniques, Emphasized self-care as important for managing overall health, Completed risk evaluation and Discussed benefits of referral for specialty care for EMDR treatment. Pt Behavioral Change Plan:  Pt set the following goals:  1. Consider reading The Body Keeps the Score by Anisha Vilchis    Pt scheduled F/U virtual visit in 3-4 weeks.

## 2021-06-04 ENCOUNTER — TELEPHONE (OUTPATIENT)
Dept: FAMILY MEDICINE CLINIC | Age: 51
End: 2021-06-04

## 2021-06-04 NOTE — TELEPHONE ENCOUNTER
Please see the attached request for records. Argenis Hope has an appt with [facility] on 6/24/2021 and they need his records from Dr. Meghan Koo before then. They are requesting records from 2019-present. Ok to release? Please advise. Thanks. : Please do not send request to [facility], the records can be printed in the office. The original request is in Juhi's basket.

## 2021-06-23 ENCOUNTER — VIRTUAL VISIT (OUTPATIENT)
Dept: PSYCHOLOGY | Age: 51
End: 2021-06-23
Payer: COMMERCIAL

## 2021-06-23 DIAGNOSIS — F43.10 PTSD (POST-TRAUMATIC STRESS DISORDER): Primary | ICD-10-CM

## 2021-06-23 PROCEDURE — 90832 PSYTX W PT 30 MINUTES: CPT | Performed by: PSYCHOLOGIST

## 2021-06-23 NOTE — PROGRESS NOTES
[phone]  Mobile Phone: [phone]  Relation: Spouse    Provider location: [place]:  Pt reported that he's feeling a little better today. Started EMDR treatment with Sly Pedersen at [address] [facility] last week. Using his senses to ground himself. Anxiety remains elevated. Feeling a physical heaviness. Finds Klonopin helpful. Looking forward to EMDR treatment, hoping it will be beneficial.      O:  Interventions:  -Supportive techniques  -Processed his experienced thus far with EMDR treatment  -Highlighted areas of progress  -Discussed terminating these behavioral health visits so patient can focus on EMDR treatment      A:  MSE:  Appearance: good hygiene  and appropriate attire  Attitude: cooperative, friendly and mild distress  Consciousness: alert  Orientation: oriented to person, place, time, general circumstance  Memory: recent and remote memory intact  Attention/Concentration: intact during session  Psychomotor Activity: normal  Eye Contact: normal  Speech: normal rate and volume, well-articulated  Mood: mildly anxious and dysphoric  Affect: congruent  Perception: within normal limits  Thought Content: within normal limits  Thought Process: logical, coherent and goal-directed  Insight: good  Judgment: intact  Ability to understand instructions: Yes  Ability to respond meaningfully: Yes  Morbid Ideation: no   Suicide Assessment: no suicidal ideation, plan, or intent. Appropriate for outpatient/telehealth care at this time. Homicidal Ideation: no    PHQ Scores 2/8/2018 9/19/2016 4/13/2016   PHQ2 Score 3 6 -   PHQ9 Score 16 23 14     Interpretation of Total Score Depression Severity: 1-4 = Minimal depression, 5-9 = Mild depression, 10-14 = Moderate depression, 15-19 = Moderately severe depression, 20-27 = Severe depression    Diagnosis:    1.  PTSD (post-traumatic stress disorder)        Patient Active Problem List   Diagnosis    Hypertension    Moderate episode of recurrent major depressive disorder ([facility] [address]    Right leg pain    Eczema    Right lumbar radiculopathy    Urinary urgency    Obstructive sleep apnea    Memory loss    Abdominal mass    Diabetes mellitus, type 2 (HCC)    Abdominal wall hernia    Hyperlipidemia    Chest pain    Left ventricular hypertrophy    Left atrial dilation    Right atrial dilation    Right ventricular dilation    Syncope    Family history of premature coronary artery disease    Post traumatic stress disorder    Claustrophobia    Anemia    Right foot pain    Left foot pain    Right bundle branch block    Right ankle pain    BMI 50.0-59.9, adult (HCC)    Type 2 diabetes mellitus with hemoglobin A1c goal of 7.0%-8.0% ([facility])    Ankle arthritis    Congenital tarsal coalition    Osteoarthritis of subtalar joint    Osteoarthritis of right subtalar joint    Tarsal coalitions    Ankle abrasion with infection    Wound infection after surgery    Type 2 diabetes mellitus without complication ([facility])    Obesity    Insomnia due to mental disorder    RYAN (generalized anxiety disorder)         Plan:  Pt interventions:  Supportive techniques, Emphasized self-care as important for managing overall health, Completed risk evaluation and Discussed benefits of referral for specialty care for EMDR treatment. Pt Behavioral Change Plan:  Pt set the following goals:  1. Consider reading The Body Keeps the Score by Valentina Cheadle der Maricela Deems    Pt may call to schedule F/U visits as needed in the future.

## 2021-07-05 DIAGNOSIS — I10 ESSENTIAL HYPERTENSION: Chronic | ICD-10-CM

## 2021-07-06 RX ORDER — TRIAMTERENE AND HYDROCHLOROTHIAZIDE 37.5; 25 MG/1; MG/1
TABLET ORAL
Qty: 30 TABLET | Refills: 5 | Status: SHIPPED | OUTPATIENT
Start: 2021-07-06 | End: 2022-01-06

## 2021-07-06 RX ORDER — CLONIDINE HYDROCHLORIDE 0.3 MG/1
TABLET ORAL
Qty: 60 TABLET | Refills: 5 | Status: SHIPPED | OUTPATIENT
Start: 2021-07-06 | End: 2022-01-06

## 2021-07-11 DIAGNOSIS — E11.9 TYPE 2 DIABETES, HBA1C GOAL < 8% (HCC): ICD-10-CM

## 2021-07-11 DIAGNOSIS — I10 ESSENTIAL HYPERTENSION: Chronic | ICD-10-CM

## 2021-07-11 RX ORDER — LISINOPRIL 40 MG/1
TABLET ORAL
Qty: 30 TABLET | Refills: 4 | Status: SHIPPED | OUTPATIENT
Start: 2021-07-11 | End: 2022-01-03

## 2021-08-04 ENCOUNTER — TELEPHONE (OUTPATIENT)
Dept: FAMILY MEDICINE CLINIC | Age: 51
End: 2021-08-04

## 2021-08-04 ENCOUNTER — OFFICE VISIT (OUTPATIENT)
Dept: FAMILY MEDICINE CLINIC | Age: 51
End: 2021-08-04
Payer: COMMERCIAL

## 2021-08-04 VITALS
HEART RATE: 101 BPM | TEMPERATURE: 97.3 F | DIASTOLIC BLOOD PRESSURE: 84 MMHG | WEIGHT: 315 LBS | SYSTOLIC BLOOD PRESSURE: 130 MMHG | RESPIRATION RATE: 16 BRPM | BODY MASS INDEX: 54.31 KG/M2 | OXYGEN SATURATION: 96 %

## 2021-08-04 DIAGNOSIS — H61.23 BILATERAL HEARING LOSS DUE TO CERUMEN IMPACTION: Primary | ICD-10-CM

## 2021-08-04 DIAGNOSIS — F43.22 ADJUSTMENT DISORDER WITH ANXIETY: ICD-10-CM

## 2021-08-04 DIAGNOSIS — L02.429 BOIL, THIGH: Primary | ICD-10-CM

## 2021-08-04 DIAGNOSIS — E11.9 TYPE 2 DIABETES MELLITUS WITH HEMOGLOBIN A1C GOAL OF 7.0%-8.0% (HCC): ICD-10-CM

## 2021-08-04 PROCEDURE — 99213 OFFICE O/P EST LOW 20 MIN: CPT | Performed by: FAMILY MEDICINE

## 2021-08-04 RX ORDER — SULFAMETHOXAZOLE AND TRIMETHOPRIM 800; 160 MG/1; MG/1
1 TABLET ORAL 2 TIMES DAILY
Qty: 14 TABLET | Refills: 0 | Status: SHIPPED | OUTPATIENT
Start: 2021-08-04 | End: 2021-08-11

## 2021-08-04 RX ORDER — GLIPIZIDE 10 MG/1
10 TABLET, FILM COATED, EXTENDED RELEASE ORAL DAILY
Qty: 30 TABLET | Refills: 5 | Status: SHIPPED | OUTPATIENT
Start: 2021-08-04 | End: 2022-04-06

## 2021-08-04 RX ORDER — CLONAZEPAM 1 MG/1
1 TABLET ORAL 3 TIMES DAILY PRN
Qty: 90 TABLET | Refills: 2 | Status: SHIPPED | OUTPATIENT
Start: 2021-08-04 | End: 2022-01-31

## 2021-08-04 NOTE — TELEPHONE ENCOUNTER
Patient did not bring up the boil concerns during the appointment. I will go ahead and sent a prescription of Bactrim twice daily to take as early as today. I advise they call us back this Friday or early next week on a status check.

## 2021-08-04 NOTE — TELEPHONE ENCOUNTER
See media. Trulicity is suppose to be approved from May 2021 until may of 2022. Giovanna Pompa is out of Gingr. Please call insurance or 3rd part on form (May 19, 2021) to problem solve why it now expensive. Went to his [employer] and told $600 for 4 pens.

## 2021-08-04 NOTE — TELEPHONE ENCOUNTER
Per pharmacy, this is most likely pt's portion to pay out of pocket- copay. He will have to call insurance plan to get more details.

## 2021-08-04 NOTE — PROGRESS NOTES
[facility]  Progress Note  DO Ovi Blankenship  1970    08/04/21    Chief Complaint:   Tc Patton is a 45 y.o. male who is here for anxiety and muffled hearing        HPI:   Here to request refill for clonazepam which is helping his anxiety and the glipizide which she takes for the diabetes. Went to his [facility] recently and told that the weekly Trulicity now $622 for a month supply. This clinic received notification in May of this year that the Trulicity was covered. Clinical check with his insurance plan to verify coverage and get back to him this week. He is asked to call us back as early as tomorrow at noon if he is not hearing back from us. Otherwise denies any ear pain. ROS negative for headache, visionchanges, chest pain, shortness of breath, abdominal pain, urinary sx, bowel changes. Past medical, surgical, and social history reviewed. and allergies reviewed. No Known Allergies  Prior to Visit Medications    Medication Sig Taking? Authorizing Provider   glipiZIDE (GLUCOTROL XL) 10 MG extended release tablet Take 1 tablet by mouth daily Yes Patrick Mckenzie DO   clonazePAM (KLONOPIN) 1 MG tablet Take 1 tablet by mouth 3 times daily as needed for Anxiety for up to 90 doses.  Yes Patrick Mckenzie DO   lisinopril (PRINIVIL;ZESTRIL) 40 MG tablet TAKE ONE TABLET BY MOUTH DAILY Yes Patrick Mckenzie DO   cloNIDine (CATAPRES) 0.3 MG tablet Take 1 tablet by mouth twice daily Yes Patrick Mckenzie DO   triamterene-hydroCHLOROthiazide (MAXZIDE-25) 37.5-25 MG per tablet Take 1 tablet by mouth once daily Yes Patrick Mckenzie DO   ARIPiprazole (ABILIFY) 10 MG tablet TAKE ONE TABLET BY MOUTH DAILY Yes Tor Mckenzie DO   simvastatin (ZOCOR) 20 MG tablet TAKE ONE TABLET BY MOUTH EVERY EVENING Yes Patrick Mckenzie DO   tamsulosin (FLOMAX) 0.4 MG capsule TAKE ONE CAPSULE BY MOUTH ONCE NIGHTLY Yes Patrick Marie Fredigcang, DO   amLODIPine (NORVASC) 5 MG tablet TAKE ONE TABLET BY MOUTH DAILY Yes Jessica Mckenzie, DO   metFORMIN (GLUCOPHAGE-XR) 500 MG extended release tablet Take 2 tablets by mouth daily (with breakfast) Yes Jessica Mckenzie, DO   nystatin (MYCOSTATIN) 295070 UNIT/GM powder Apply 3 times daily to the groin Yes Jessica Mckenzie, DO   COCONUT OIL PO Take by mouth Yes Historical Provider, MD   Cinnamon 500 MG CAPS Take by mouth Yes Historical Provider, MD MALDONADO OMEGA-3 KRILL OIL PO Take by mouth Yes Historical Provider, MD   aspirin EC 81 MG EC tablet Take 81 mg by mouth daily.  Yes Historical Provider, MD   Dulaglutide (TRULICITY) 5.19 BA/2.4GE SOPN Inject 0.75 mg into the skin once a week  Patient not taking: Reported on 8/4/2021  Camilla Babin DO          Vitals:    08/04/21 1100   BP: 130/84   Pulse: 101   Resp: 16   Temp: 97.3 °F (36.3 °C)   TempSrc: Tympanic   SpO2: 96%   Weight: (!) 389 lb 6.4 oz (176.6 kg)      Wt Readings from Last 3 Encounters:   08/04/21 (!) 389 lb 6.4 oz (176.6 kg)   03/25/21 (!) 376 lb 12.8 oz (170.9 kg)   01/06/21 (!) 374 lb 3.2 oz (169.7 kg)     BP Readings from Last 3 Encounters:   08/04/21 130/84   03/25/21 137/82   01/06/21 134/73       Patient Active Problem List   Diagnosis    Hypertension    Moderate episode of recurrent major depressive disorder (HCC)    Right leg pain    Eczema    Right lumbar radiculopathy    Urinary urgency    Obstructive sleep apnea    Memory loss    Abdominal mass    Diabetes mellitus, type 2 (HCC)    Abdominal wall hernia    Hyperlipidemia    Chest pain    Left ventricular hypertrophy    Left atrial dilation    Right atrial dilation    Right ventricular dilation    Syncope    Family history of premature coronary artery disease    Post traumatic stress disorder    Claustrophobia    Anemia    Right foot pain    Left foot pain    Right bundle branch block    Right ankle pain    BMI 50.0-59.9, adult Neck: soft, supple, no appreciable thyromegaly,mass  CV: No upper extremity edema. Resp:  Breathing comfortably. Psych:normal affect. Neuro: AOx3  Other Pertinent Physical Exam findings:   Cerumen impaction noted bilaterally. There is more cerumen in the right versus the left but both sides are impacted. ASSESSMENT   Diagnosis Orders   1. Bilateral hearing loss due to cerumen impaction     2. Type 2 diabetes mellitus with hemoglobin A1c goal of 7.0%-8.0% ([facility])  glipiZIDE (GLUCOTROL XL) 10 MG extended release tablet   3. Adjustment disorder with anxiety  clonazePAM (KLONOPIN) 1 MG tablet     #1: Medical assistant [facility] was successful with cerumen removal bilaterally. Patient was able to hear much better after successful cerumen removal.  #2. The current medical regimen is effective;  continue present plan and medications. #3: The current medical regimen is effective;  continue present plan and medications. Pt aware of need for every 3 month medication followup appointments, and that medication refills for benzodiazepines, narcotics and/or stimulants will only be given at appointment. The risks, benefits, potential side effects and barriers to medication use were addressed today. Understanding was acknowledged. Patient asked to follow-up if condition(s) do not improve as anticipated.         PLAN          Time spent on encounter (including any number of the following: review of labs, imaging, provider notes, outside hospital records; performing examination/evaluation; counseling patient and family; ordering medications/tests; placing referrals and communication with referring physicians; coordination of care, and documentation in the EHR): 28 minutes  Established E/M: 10-19 (21336), 20-29 (43790), 30-39 (95045), 40-54 (50787)   New E/M: 15-29 (87926), 30-44 (34152), 45-59 (12577), 60-74 (67928)  Telephone E/M: 5-10 ([place]), 11-20 (26071), 21-30 (35127)    If applicable, see additional patient information and instructions under \"Patient Instructions. \"    No follow-ups on file. There are no Patient Instructions on file for this visit. Please note a portion of this chart was generated using dragon dictation software. Although every effort was made to ensure the accuracy of this automated transcription,some errors in transcription may have occurred.

## 2021-08-04 NOTE — TELEPHONE ENCOUNTER
Returned call to wife to advise to pickup Bactrim rx and follow up with Dr. Adalid Denny with effectiveness. Wife voiced understanding.

## 2021-08-04 NOTE — TELEPHONE ENCOUNTER
Patient's wifeWilleacuong Woodson) called stating that a boil that was on patient's thigh is now infected. Kelvin Siddiqi would like a VV or something called in for the infection. Please call Kelvin Siddiqi. [phone]. Please advise. Thanks.

## 2021-09-21 DIAGNOSIS — E11.9 TYPE 2 DIABETES MELLITUS WITH HEMOGLOBIN A1C GOAL OF 7.0%-8.0% (HCC): ICD-10-CM

## 2021-09-21 RX ORDER — METFORMIN HYDROCHLORIDE 500 MG/1
TABLET, EXTENDED RELEASE ORAL
Qty: 60 TABLET | Refills: 5 | Status: SHIPPED | OUTPATIENT
Start: 2021-09-21 | End: 2021-12-09

## 2021-09-21 NOTE — TELEPHONE ENCOUNTER
Requested Prescriptions     Pending Prescriptions Disp Refills    metFORMIN (GLUCOPHAGE-XR) 500 MG extended release tablet [Pharmacy Med Name: METFORMIN HCL  MG TABLET] 60 tablet 5     Sig: TAKE TWO TABLETS BY MOUTH DAILY WITH BREAKFAST     Last ov 8/4/21  Last lab 5/5/21

## 2021-10-26 ENCOUNTER — TELEMEDICINE (OUTPATIENT)
Dept: PSYCHOLOGY | Age: 51
End: 2021-10-26
Payer: COMMERCIAL

## 2021-10-26 ENCOUNTER — TELEPHONE (OUTPATIENT)
Dept: FAMILY MEDICINE CLINIC | Age: 51
End: 2021-10-26

## 2021-10-26 DIAGNOSIS — F43.10 PTSD (POST-TRAUMATIC STRESS DISORDER): Primary | ICD-10-CM

## 2021-10-26 DIAGNOSIS — F51.5 NIGHTMARES: Primary | ICD-10-CM

## 2021-10-26 PROCEDURE — 90832 PSYTX W PT 30 MINUTES: CPT | Performed by: PSYCHOLOGIST

## 2021-10-26 RX ORDER — PRAZOSIN HYDROCHLORIDE 2 MG/1
2 CAPSULE ORAL NIGHTLY
Qty: 30 CAPSULE | Refills: 0 | Status: SHIPPED | OUTPATIENT
Start: 2021-10-26 | End: 2021-11-29

## 2021-10-26 NOTE — PROGRESS NOTES
Behavioral Health Consultation  Rhea Trevino Psy.D. Psychologist  10/26/2021  10:30-11 AM      Time spent with Patient: 30 minutes  This is patient's fifth [facility] appointment. Reason for Consult: PTSD  Referring Provider: DO Elodia Perera [facility]    TELEHEALTH VISIT -- Audio/Visual (During QVRRZ-02 public health emergency)  }  Pursuant to the emergency declaration under the [address] [place] [address] [facility] and the [employer] Act, this Virtual Visit was conducted, with patient's consent, to reduce the patient's risk of exposure to COVID-19 and provide continuity of care for an established patient. Services were provided through a video synchronous discussion virtually to substitute for in-person clinic visit. Pt gave verbal informed consent to participate in telehealth services. Conducted a risk-benefit analysis and determined that the patient's presenting problems are consistent with the use of telepsychology. Determined that the patient has sufficient knowledge and skills in the use of technology enabling them to adequately benefit from telepsychology. It was determined that this patient was able to be properly treated without an in-person session. Patient verified that they were currently located at the address that was provided during registration.     Verified the following information:  Patient's identification: Yes  Patient location: [address] [place] [address]  Patient's call back number: [phone]  Patient's emergency contact's name and number, as well as permission to contact them if needed:  Extended Emergency Contact Information  Primary Emergency Contact: Laverne Vasquez  Address: [address] [place] [address] Phone: [phone]  Mobile Phone: [phone]  Relation: Spouse    Provider location: [place], [address]:  Pt reported that he had a very rough week d/t panic attacks. Nearly called 911 on one occasion. Pt continues to work with Laila Sanders at [address] [facility], which he finds helpful. She recommended that pt discuss prazosin with his PCP. Pt takes Klonopin 1mg TID. O:  Interventions:  -Supportive techniques  -Processed his experience of recent treatment at [address] [facility]  -Reinforced his efforts towards self-care  -Recommended TIP strategies of using cold temperatures to active the vagus nerve  -Discussed medications. Recommended pt discuss prazosin with his PCP. -Reinforced pt's work with his therapist. Encouraged him to follow up with his therapist rather than this writer in the future, unless he has stopped working with her, in which case he can return to my care. A:  MSE:  Appearance: good hygiene  and appropriate attire  Attitude: cooperative, friendly and mild distress  Consciousness: alert  Orientation: oriented to person, place, time, general circumstance  Memory: recent and remote memory intact  Attention/Concentration: intact during session  Psychomotor Activity: normal  Eye Contact: normal  Speech: normal rate and volume, well-articulated  Mood: anxious, mildly dysphoric  Affect: congruent  Perception: within normal limits  Thought Content: within normal limits  Thought Process: logical, coherent and goal-directed  Insight: good  Judgment: intact  Ability to understand instructions: Yes  Ability to respond meaningfully: Yes  Morbid Ideation: no   Suicide Assessment: no suicidal ideation, plan, or intent. Appropriate for outpatient/telehealth care at this time.   Homicidal Ideation: no    PHQ Scores 2/8/2018 9/19/2016 4/13/2016   PHQ2 Score 3 6 -   PHQ9 Score 16 23 14     Interpretation of Total Score Depression Severity: 1-4 = Minimal depression, 5-9 = Mild depression, 10-14 = Moderate depression, 15-19 = Moderately severe depression, 20-27 = Severe depression    Diagnosis:    1. PTSD (post-traumatic stress disorder)        Patient Active Problem List   Diagnosis    Hypertension    Moderate episode of recurrent major depressive disorder (HCC)    Right leg pain    Eczema    Right lumbar radiculopathy    Urinary urgency    Obstructive sleep apnea    Memory loss    Abdominal mass    Diabetes mellitus, type 2 ([facility]    Abdominal wall hernia    Hyperlipidemia    Chest pain    Left ventricular hypertrophy    Left atrial dilation    Right atrial dilation    Right ventricular dilation    Syncope    Family history of premature coronary artery disease    Post traumatic stress disorder    Claustrophobia    Anemia    Right foot pain    Left foot pain    Right bundle branch block    Right ankle pain    BMI 50.0-59.9, adult ([facility])    Type 2 diabetes mellitus with hemoglobin A1c goal of 7.0%-8.0% ([facility])    Ankle arthritis    Congenital tarsal coalition    Osteoarthritis of subtalar joint    Osteoarthritis of right subtalar joint    Tarsal coalitions    Ankle abrasion with infection    Wound infection after surgery    Type 2 diabetes mellitus without complication ([facility])    Obesity    Insomnia due to mental disorder    RYAN (generalized anxiety disorder)         Plan:  Pt interventions:  Supportive techniques, Provided Psychoeducation re: see above, Emphasized self-care as important for managing overall health, Cognitive strategies to target balanced thinking and Discussed psychotropic medications. Pt Behavioral Change Plan:  Pt set the following goals:  1. Consider reading The Body Keeps the Score by Cele Roy lawrence Steve Medina    Pt may call to schedule F/U visits as needed in the future.

## 2021-10-26 NOTE — TELEPHONE ENCOUNTER
Received communication from [facility]) behavioral health consultant Dr. Eliceo Campos about patient's ongoing PTSD and nightmares. I will ask our clinic staff to contact patient to offer medication to take at bedtime or anytime within 1 hour of schedule sleep. It is called prazosin. We will start at a low dose. The goal is that this could help him get better sleep and decrease the amount of nightmares he experiences. He would need to take this on a nightly basis. If he prefers to not start the medication yet but discuss further at his upcoming appointment that is fine as well.   I will see him soon on November 1 and we will continue to focus on ways to ease anxiety

## 2021-11-01 ENCOUNTER — OFFICE VISIT (OUTPATIENT)
Dept: FAMILY MEDICINE CLINIC | Age: 51
End: 2021-11-01
Payer: COMMERCIAL

## 2021-11-01 VITALS
SYSTOLIC BLOOD PRESSURE: 138 MMHG | OXYGEN SATURATION: 95 % | DIASTOLIC BLOOD PRESSURE: 74 MMHG | HEART RATE: 95 BPM | RESPIRATION RATE: 12 BRPM | BODY MASS INDEX: 39.41 KG/M2 | WEIGHT: 282.6 LBS | TEMPERATURE: 97.3 F

## 2021-11-01 DIAGNOSIS — E11.9 TYPE 2 DIABETES MELLITUS WITH HEMOGLOBIN A1C GOAL OF 7.0%-8.0% (HCC): ICD-10-CM

## 2021-11-01 DIAGNOSIS — F51.5 NIGHTMARES: Primary | ICD-10-CM

## 2021-11-01 DIAGNOSIS — Z23 NEED FOR SHINGLES VACCINE: ICD-10-CM

## 2021-11-01 DIAGNOSIS — F43.22 ADJUSTMENT DISORDER WITH ANXIETY: ICD-10-CM

## 2021-11-01 DIAGNOSIS — Z23 FLU VACCINE NEED: ICD-10-CM

## 2021-11-01 PROCEDURE — 90471 IMMUNIZATION ADMIN: CPT | Performed by: FAMILY MEDICINE

## 2021-11-01 PROCEDURE — 90750 HZV VACC RECOMBINANT IM: CPT | Performed by: FAMILY MEDICINE

## 2021-11-01 PROCEDURE — 90472 IMMUNIZATION ADMIN EACH ADD: CPT | Performed by: FAMILY MEDICINE

## 2021-11-01 PROCEDURE — 99214 OFFICE O/P EST MOD 30 MIN: CPT | Performed by: FAMILY MEDICINE

## 2021-11-01 PROCEDURE — 90674 CCIIV4 VAC NO PRSV 0.5 ML IM: CPT | Performed by: FAMILY MEDICINE

## 2021-11-01 NOTE — PATIENT INSTRUCTIONS
THE PRAZOSIN MEDICATION IS SAFE WITH YOUR CURRENT PRESCRIPTIONS. TRY THE MEDICATION. TAKE IT EITHER AT BEDTIME OR WITHIN 3 HOURS BEFORE BEDITME. IF STILL EXPERIENCING BAD DREAMS OR PTSD AND NO BETTER, CALL THIS CLINIC IN 10-14 DAYS. Do your bloodwork in November or December. --You can get your lab work or x-ray done at [facility]. LAB: [address]  Lab hours (7AM - 5PM Monday through Friday; 8AM - noon on Saturdays),   Ph# [phone]    X-RAY: [address]  Radiology hours, (7:00AM - 5PM Monday through Friday only)  Ph# [phone] or [phone]  --Call our office in 1 week if you have not heard about the results. Or check [employer] if you have previously enrolled. YOU DID YOUR FLU SHOT AND YOUR SHINGLES VACCINE TODAY.

## 2021-11-01 NOTE — PROGRESS NOTES
[facility]  Progress Note  Josphine Runner, [place] Zoran Persic  1970    11/02/21    Chief Complaint:   Karin Fuller is a 45 y.o. male who is here for nightmares and PTSD        HPI:   Had panic attacks and was about to call 911. Takes clonazepam.  Talk therapist recommending prazosin through [facility]. Finding this is a good partnership in helping ease his nightmares. Going through the disability process      ROS negative for headache, visionchanges, chest pain, shortness of breath, abdominal pain, urinary sx, bowel changes. Past medical, surgical, and social history reviewed. and allergies reviewed. No Known Allergies  Prior to Visit Medications    Medication Sig Taking?  Authorizing Provider   prazosin (MINIPRESS) 2 MG capsule Take 1 capsule by mouth nightly Yes Brian Cunningham, DO   metFORMIN (GLUCOPHAGE-XR) 500 MG extended release tablet TAKE TWO TABLETS BY MOUTH DAILY WITH BREAKFAST Yes [facility] DO   glipiZIDE (GLUCOTROL XL) 10 MG extended release tablet Take 1 tablet by mouth daily Yes Brian Cunningham,    lisinopril (PRINIVIL;ZESTRIL) 40 MG tablet TAKE ONE TABLET BY MOUTH DAILY Yes [facility] DO   cloNIDine (CATAPRES) 0.3 MG tablet Take 1 tablet by mouth twice daily Yes [facility] DO   triamterene-hydroCHLOROthiazide (MAXZIDE-25) 37.5-25 MG per tablet Take 1 tablet by mouth once daily Yes [facility]   ARIPiprazole (ABILIFY) 10 MG tablet TAKE ONE TABLET BY MOUTH DAILY Yes [facility]   simvastatin (ZOCOR) 20 MG tablet TAKE ONE TABLET BY MOUTH EVERY EVENING Yes [facility]   tamsulosin (FLOMAX) 0.4 MG capsule TAKE ONE CAPSULE BY MOUTH ONCE NIGHTLY Yes Tor Mckenzie DO   amLODIPine (NORVASC) 5 MG tablet TAKE ONE TABLET BY MOUTH DAILY Yes [facility]   nystatin (MYCOSTATIN) 015865 UNIT/GM powder Apply 3 times daily to the groin Yes Kerry Mckenzie, DO   COCONUT OIL PO Take by mouth Yes Historical Provider, MD   Cinnamon 500 MG CAPS Take by mouth Yes Historical Provider, MD MALDONADO OMEGA-3 KRILL OIL PO Take by mouth Yes Historical Provider, MD   aspirin EC 81 MG EC tablet Take 81 mg by mouth daily. Yes Historical Provider, MD   clonazePAM (KLONOPIN) 1 MG tablet Take 1 tablet by mouth 3 times daily as needed for Anxiety for up to 90 doses.   Leighannlizbeth Silva Magaly, DO          Vitals:    11/01/21 1535 11/01/21 1557   BP: (!) 158/84 138/74   Pulse: 95    Resp: 12    Temp: 97.3 °F (36.3 °C)    TempSrc: Temporal    SpO2: 95%    Weight: 282 lb 9.6 oz (128.2 kg)       Wt Readings from Last 3 Encounters:   11/01/21 282 lb 9.6 oz (128.2 kg)   08/04/21 (!) 389 lb 6.4 oz (176.6 kg)   03/25/21 (!) 376 lb 12.8 oz (170.9 kg)     BP Readings from Last 3 Encounters:   11/01/21 138/74   08/04/21 130/84   03/25/21 137/82       Patient Active Problem List   Diagnosis    Hypertension    Moderate episode of recurrent major depressive disorder ([facility])    Right leg pain    Eczema    Right lumbar radiculopathy    Urinary urgency    Obstructive sleep apnea    Memory loss    Abdominal mass    Diabetes mellitus, type 2 ([facility])    Abdominal wall hernia    Hyperlipidemia    Chest pain    Left ventricular hypertrophy    Left atrial dilation    Right atrial dilation    Right ventricular dilation    Syncope    Family history of premature coronary artery disease    Post traumatic stress disorder    Claustrophobia    Anemia    Right foot pain    Left foot pain    Right bundle branch block    Right ankle pain    BMI 50.0-59.9, adult ([facility])    Type 2 diabetes mellitus with hemoglobin A1c goal of 7.0%-8.0% ([facility])    Ankle arthritis    Congenital tarsal coalition    Osteoarthritis of subtalar joint    Osteoarthritis of right subtalar joint    Tarsal coalitions    Ankle abrasion with infection    Wound infection after surgery    Type 2 diabetes mellitus without complication ([facility]    Obesity    Insomnia due to mental disorder    RYAN (generalized anxiety disorder)       Immunization History   Administered Date(s) Administered    COVID-19, Pfizer, PF, 30mcg/0.3mL 03/18/2021, 04/08/2021    Influenza Virus Vaccine 03/24/2016    Influenza, MDCK Quadv, IM, PF (Flucelvax 2 yrs and older) 11/01/2021    Influenza, Quadv, IM, (6 mo and older Fluzone, Flulaval, Fluarix and 3 yrs and older Afluria) 09/12/2019    Zoster Recombinant (Shingrix) 11/01/2021       Past Medical History:   Diagnosis Date    Abdominal wall hernia 2/21/2012    Anemia 4/15/2013    Claustrophobia 4/15/2013    Depression 3/8/2011    Diabetes mellitus, type 2 ([facility] 2/20/2012    Family history of premature coronary artery disease 6/19/2012    Hyperlipidemia 2/27/2012    Hypertension 3/8/2011    Insomnia 2/17/2012    Left atrial dilation 6/19/2012    Left ventricular hypertrophy 6/19/2012    Neuropathy     Obesity 3/8/2011    Obstructive sleep apnea 2/17/2012    uses Cpap sometimes    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    Post traumatic stress disorder 4/15/2013    Right atrial dilation 6/19/2012    Right bundle branch block 5/15/2014    Right ventricular dilation 6/19/2012     Past Surgical History:   Procedure Laterality Date    CARDIOVASCULAR STRESS TEST  May 28 2014    Non-Imaging Stress Test    CIRCUMCISION  03/2017    Due to phimosis: Dr. Sayra Cevallos Right 2/1/2016    ankle subtower fusion with bone graft using c-arm     Family History   Problem Relation Age of Onset    Heart Disease Mother     Coronary Art Dis Mother     Heart Attack Mother     Mental Illness Mother      Social History     Socioeconomic History    Marital status:      Spouse name: All Godfrey Number of children: 0    Years of education: Not on file    Highest education level: Not on file   Occupational History    Occupation:    Tobacco Use    Smoking status: Former Smoker     Packs/day: 0.25     Years: 5.00     Pack years: 1.25    Smokeless tobacco: Never Used    Tobacco comment: quit smoking about 2004   Substance and Sexual Activity    Alcohol use: No    Drug use: No    Sexual activity: Yes     Partners: Female     Comment:  - [address] [place]   Other Topics Concern    Not on file   Social History Narrative    Not on file     Social Determinants of Health     Financial Resource Strain: Low Risk     Difficulty of Paying Living Expenses: Not very hard   Food Insecurity: No Food Insecurity    Worried About Running Out of Food in the Last Year: Never true    Sylvia of Food in the Last Year: Never true   Transportation Needs: No Transportation Needs    Lack of Transportation (Medical): No    Lack of Transportation (Non-Medical): No   Physical Activity:     Days of Exercise per Week:     Minutes of Exercise per Session:    Stress:     Feeling of Stress :    Social Connections:     Frequency of Communication with Friends and Family:     Frequency of Social Gatherings with Friends and Family:     Attends [employer] Services:     Active Member of Clubs or Organizations:     Attends Club or Organization Meetings:     Marital Status:    Intimate Partner Violence:     Fear of Current or Ex-Partner:     Emotionally Abused:     Physically Abused:     Sexually Abused:        O: /74   Pulse 95   Temp 97.3 °F (36.3 °C) (Temporal)   Resp 12   Wt 282 lb 9.6 oz (128.2 kg)   SpO2 95%   BMI 39.41 kg/m²   Physical Exam  GEN: No acute distress,cooperative, well nourished, alert. HEENT: PEERLA, EOMI , normocephalic/atraumatic, external nose appears normal.  External ear is normal.    Neck: soft, supple, no appreciable thyromegaly,mass  CV: No upper extremity edema. Resp:  Breathing comfortably. Psych: Somewhat anxious and dysthymic affect.   Does not endorse SI/HI  Neuro: AOx3  Other Pertinent Physical Exam findings:     Lab Results   Component Value Date    LABA1C 12.4 05/05/2021     Lab Results   Component Value Date    .2 05/05/2021         ASSESSMENT   Diagnosis Orders   1. Nightmares     2. Type 2 diabetes mellitus with hemoglobin A1c goal of 7.0%-8.0% ([facility])  Hemoglobin A1C    Comprehensive Metabolic Panel    Lipid Panel    Microalbumin / Creatinine Urine Ratio   3. Need for shingles vaccine  Zoster recombinant [facility])   4. Flu vaccine need  INFLUENZA, MDCK QUADV, 2 YRS AND OLDER, IM, PF, PREFILL SYR OR SDV, 0.5ML (FLUCELVAX QUADV, PF)   5. Adjustment disorder with anxiety         #1 and #5: He has not yet started the prazosin. Prescription was just recently sent to the pharmacy. Encouraged to take nightly. If not effective in 1 to 2 weeks can call us back and will consider increasing dosing. Keep a partnership with talk therapist.  #2:  Due for lab work. Anticipate not controlled with last A1c in the 12% range. Will likely need dedicated office appointment to improve hyperglycemia. Preventive care completed for #3 and #4 today. Dose 1 of Shingrix. PLAN          Time spent on encounter (including any number of the following: review of labs, imaging, provider notes, outside hospital records; performing examination/evaluation; counseling patient and family; ordering medications/tests; placing referrals and communication with referring physicians; coordination of care, and documentation in the EHR): 32 minutes  Established E/M: 10-19 (14008), 20-29 (89792), 30-39 (68915), 40-54 (49571)   New E/M: 15-29 (98064), 30-44 (59430), 45-59 (24629), 60-74 (67632)  Telephone E/M: 5-10 (Constantin), 11-20 (99320), 21-30 (77514)    If applicable, see additional patient information and instructions under \"Patient Instructions. \"    Return in about 3 months (around 2/1/2022) for Diabetes possibly sooner depending on lab results. .  Patient Instructions   THE PRAZOSIN MEDICATION IS SAFE WITH YOUR CURRENT PRESCRIPTIONS. TRY THE MEDICATION. TAKE IT EITHER AT BEDTIME OR WITHIN 3 HOURS BEFORE BEDITME. IF STILL EXPERIENCING BAD DREAMS OR PTSD AND NO BETTER, CALL THIS CLINIC IN 10-14 DAYS. Do your bloodwork in November or December. --You can get your lab work or x-ray done at [facility]. LAB: [address]  Lab hours (7AM - 5PM Monday through Friday; 8AM - noon on Saturdays),   Ph# [phone]    X-RAY: [address]  Radiology hours, (7:00AM - 5PM Monday through Friday only)  Ph# [phone] or [phone]  --Call our office in 1 week if you have not heard about the results. Or check [employer] if you have previously enrolled. YOU DID YOUR FLU SHOT AND YOUR SHINGLES VACCINE TODAY. Please note a portion of this chart was generated using dragon dictation software. Although every effort was made to ensure the accuracy of this automated transcription,some errors in transcription may have occurred.

## 2021-11-11 ENCOUNTER — TELEPHONE (OUTPATIENT)
Dept: FAMILY MEDICINE CLINIC | Age: 51
End: 2021-11-11

## 2021-11-11 NOTE — TELEPHONE ENCOUNTER
Ovi's spouse called stating she called his insurance and was told they will cover Trulicity 1.5 mg/mL and 3 mg/mL. She would like to know if a script can be sent to [facility]. She states his blood sugar is not controlled. Its always in the 200s. Please advise. Thanks.         [employer] [address] [place] [address] Dr Sanchez7 [facility] [phone]

## 2021-11-28 DIAGNOSIS — F51.5 NIGHTMARES: ICD-10-CM

## 2021-11-28 DIAGNOSIS — F39 MOOD DISORDER (HCC): ICD-10-CM

## 2021-11-28 DIAGNOSIS — E11.9 TYPE 2 DIABETES MELLITUS WITHOUT COMPLICATION, UNSPECIFIED WHETHER LONG TERM INSULIN USE (HCC): ICD-10-CM

## 2021-11-28 DIAGNOSIS — F43.10 POST TRAUMATIC STRESS DISORDER: ICD-10-CM

## 2021-11-29 RX ORDER — TAMSULOSIN HYDROCHLORIDE 0.4 MG/1
CAPSULE ORAL
Qty: 30 CAPSULE | Refills: 5 | Status: SHIPPED | OUTPATIENT
Start: 2021-11-29 | End: 2022-06-08 | Stop reason: SDUPTHER

## 2021-11-29 RX ORDER — SIMVASTATIN 20 MG
TABLET ORAL
Qty: 30 TABLET | Refills: 5 | Status: SHIPPED | OUTPATIENT
Start: 2021-11-29 | End: 2022-06-08 | Stop reason: SDUPTHER

## 2021-11-29 RX ORDER — ARIPIPRAZOLE 10 MG/1
TABLET ORAL
Qty: 30 TABLET | Refills: 5 | Status: SHIPPED | OUTPATIENT
Start: 2021-11-29 | End: 2022-06-08 | Stop reason: ALTCHOICE

## 2021-11-29 RX ORDER — PRAZOSIN HYDROCHLORIDE 2 MG/1
CAPSULE ORAL
Qty: 30 CAPSULE | Refills: 0 | Status: SHIPPED | OUTPATIENT
Start: 2021-11-29 | End: 2021-12-27

## 2021-11-30 DIAGNOSIS — E11.9 TYPE 2 DIABETES MELLITUS WITH HEMOGLOBIN A1C GOAL OF 7.0%-8.0% (HCC): ICD-10-CM

## 2021-11-30 LAB
A/G RATIO: 1.6 (ref 1.1–2.2)
ALBUMIN SERPL-MCNC: 4.2 G/DL (ref 3.4–5)
ALP BLD-CCNC: 70 U/L (ref 40–129)
ALT SERPL-CCNC: 132 U/L (ref 10–40)
ANION GAP SERPL CALCULATED.3IONS-SCNC: 15 MMOL/L (ref 3–16)
AST SERPL-CCNC: 59 U/L (ref 15–37)
BILIRUB SERPL-MCNC: 0.3 MG/DL (ref 0–1)
BUN BLDV-MCNC: 19 MG/DL (ref 7–20)
CALCIUM SERPL-MCNC: 9.2 MG/DL (ref 8.3–10.6)
CHLORIDE BLD-SCNC: 97 MMOL/L (ref 99–110)
CHOLESTEROL, TOTAL: 152 MG/DL (ref 0–199)
CO2: 25 MMOL/L (ref 21–32)
CREAT SERPL-MCNC: 0.6 MG/DL (ref 0.9–1.3)
CREATININE URINE: 102.9 MG/DL (ref 39–259)
GFR AFRICAN AMERICAN: >60
GFR NON-AFRICAN AMERICAN: >60
GLUCOSE BLD-MCNC: 299 MG/DL (ref 70–99)
HDLC SERPL-MCNC: 58 MG/DL (ref 40–60)
LDL CHOLESTEROL CALCULATED: 77 MG/DL
MICROALBUMIN UR-MCNC: <1.2 MG/DL
MICROALBUMIN/CREAT UR-RTO: NORMAL MG/G (ref 0–30)
POTASSIUM SERPL-SCNC: 4.2 MMOL/L (ref 3.5–5.1)
SODIUM BLD-SCNC: 137 MMOL/L (ref 136–145)
TOTAL PROTEIN: 6.9 G/DL (ref 6.4–8.2)
TRIGL SERPL-MCNC: 84 MG/DL (ref 0–150)
VLDLC SERPL CALC-MCNC: 17 MG/DL

## 2021-12-01 LAB
ESTIMATED AVERAGE GLUCOSE: 317.8 MG/DL
HBA1C MFR BLD: 12.7 %

## 2021-12-03 ENCOUNTER — TELEPHONE (OUTPATIENT)
Dept: FAMILY MEDICINE CLINIC | Age: 51
End: 2021-12-03

## 2021-12-03 DIAGNOSIS — E11.9 TYPE 2 DIABETES MELLITUS WITH HEMOGLOBIN A1C GOAL OF 7.0%-8.0% (HCC): Primary | ICD-10-CM

## 2021-12-03 RX ORDER — DULAGLUTIDE 1.5 MG/.5ML
INJECTION, SOLUTION SUBCUTANEOUS
Qty: 5 PEN | Refills: 5 | Status: SHIPPED
Start: 2021-12-03 | End: 2021-12-09

## 2021-12-03 RX ORDER — DULAGLUTIDE 1.5 MG/.5ML
1.5 INJECTION, SOLUTION SUBCUTANEOUS WEEKLY
Qty: 5 PEN | Refills: 5 | Status: SHIPPED | OUTPATIENT
Start: 2021-12-03 | End: 2021-12-03 | Stop reason: SDUPTHER

## 2021-12-03 NOTE — TELEPHONE ENCOUNTER
Since it has been more than 2 months I will have a Jameson Rodriguez begin the lowest dose which is 0.75 mg to be injected into the side of the lower abdomen. Do this every 7 days for 4 weeks (4 injections over the span of 1 month)    THEN    Increase to 1.5 mg into the side of the lower abdomen every 7 days thereafter.

## 2021-12-03 NOTE — TELEPHONE ENCOUNTER
See result note about uncontrolled A1c and wife asking about Trulicity. I thought patient had discontinued the Trulicity. Please call back to see if he is still on it. If so then I would recommend that injecting the 1.5 mg once a week (every 7 days). Prescription sent to [address] [place] [address]. If he has not been on Trulicity for several weeks then route back to me to give recommendation of titration schedule.

## 2021-12-07 NOTE — TELEPHONE ENCOUNTER
Submitted PA for Trulicity 7.6MH/6.0HQ pen-injectors, Key: [id number]. Medication has been APPROVED through 12/07/2022. Please notify patient. Thank you.

## 2021-12-08 ENCOUNTER — TELEPHONE (OUTPATIENT)
Dept: FAMILY MEDICINE CLINIC | Age: 51
End: 2021-12-08

## 2021-12-08 DIAGNOSIS — E11.9 TYPE 2 DIABETES MELLITUS WITHOUT COMPLICATION, WITH LONG-TERM CURRENT USE OF INSULIN (HCC): Primary | ICD-10-CM

## 2021-12-08 DIAGNOSIS — Z79.4 TYPE 2 DIABETES MELLITUS WITHOUT COMPLICATION, WITH LONG-TERM CURRENT USE OF INSULIN (HCC): Primary | ICD-10-CM

## 2021-12-08 NOTE — TELEPHONE ENCOUNTER
Patient's wife Jitendra Hidden) called concerning patient's medication and would like clinical to call her back. Please advise. Thanks.

## 2021-12-08 NOTE — TELEPHONE ENCOUNTER
Wife states the trulicity is $927 even with the PA. She Called ins.  And [address] is covered if you would like to refer him to a specialist. Can you RX anything until he can see specialist?

## 2021-12-09 RX ORDER — METFORMIN HYDROCHLORIDE 500 MG/1
2000 TABLET, EXTENDED RELEASE ORAL
Qty: 120 TABLET | Refills: 5 | Status: SHIPPED | OUTPATIENT
Start: 2021-12-09 | End: 2022-04-06

## 2021-12-09 NOTE — TELEPHONE ENCOUNTER
Give them referral information for Zofran to call to set up appointment to meet with endocrinologist.    Additionally since he is on Metformin extended release 500 mg, two tablets daily (1000 mg daily),    I would like for him to increase dosing in the meantime. He has the option of either taking 2 tablets in the morning and 2 tablets in the evening with food     or     to take all 4 tablets with food with a meal.    Either way it should be total of 2000 mg every 24 hour. Prescription is updated at [facility]. Please let them know.

## 2021-12-26 DIAGNOSIS — F51.5 NIGHTMARES: ICD-10-CM

## 2021-12-27 RX ORDER — PRAZOSIN HYDROCHLORIDE 2 MG/1
CAPSULE ORAL
Qty: 30 CAPSULE | Refills: 0 | Status: SHIPPED | OUTPATIENT
Start: 2021-12-27 | End: 2022-01-20

## 2022-01-03 DIAGNOSIS — I10 ESSENTIAL HYPERTENSION: Chronic | ICD-10-CM

## 2022-01-03 DIAGNOSIS — E11.9 TYPE 2 DIABETES, HBA1C GOAL < 8% (HCC): ICD-10-CM

## 2022-01-03 RX ORDER — LISINOPRIL 40 MG/1
TABLET ORAL
Qty: 30 TABLET | Refills: 4 | Status: SHIPPED | OUTPATIENT
Start: 2022-01-03 | End: 2022-06-08 | Stop reason: SDUPTHER

## 2022-01-05 DIAGNOSIS — I10 ESSENTIAL HYPERTENSION: Chronic | ICD-10-CM

## 2022-01-06 RX ORDER — TRIAMTERENE AND HYDROCHLOROTHIAZIDE 37.5; 25 MG/1; MG/1
TABLET ORAL
Qty: 30 TABLET | Refills: 0 | Status: SHIPPED | OUTPATIENT
Start: 2022-01-06 | End: 2022-02-07

## 2022-01-06 RX ORDER — CLONIDINE HYDROCHLORIDE 0.3 MG/1
TABLET ORAL
Qty: 60 TABLET | Refills: 0 | Status: SHIPPED | OUTPATIENT
Start: 2022-01-06 | End: 2022-02-07

## 2022-01-20 ENCOUNTER — TELEPHONE (OUTPATIENT)
Dept: FAMILY MEDICINE CLINIC | Age: 52
End: 2022-01-20

## 2022-01-20 DIAGNOSIS — F51.4 ADULT NIGHT TERROR: Primary | ICD-10-CM

## 2022-01-20 RX ORDER — PRAZOSIN HYDROCHLORIDE 5 MG/1
5 CAPSULE ORAL NIGHTLY
Qty: 30 CAPSULE | Refills: 3 | Status: SHIPPED | OUTPATIENT
Start: 2022-01-20 | End: 2022-06-08 | Stop reason: SDUPTHER

## 2022-01-20 NOTE — TELEPHONE ENCOUNTER
Rafsj Patel spouse, called requesting to speak with Dr. Lopez Hansen MA. She states Hugo Downey would like to increase his dose of prazosin. He is currently taking 2 mg daily. She states Dr. Ted Tate told them he could increase the dose if needed. Please advise. Thanks.       Everett Echevarria  [phone] (H)     [address]

## 2022-01-20 NOTE — TELEPHONE ENCOUNTER
I went ahead and sent a prescription of the next dose up of prazosin to the [address] [place] [address]. Armani Arthur would switch from the 2 mg prazosin at bedtime to 5 mg at bedtime.

## 2022-01-30 DIAGNOSIS — F43.22 ADJUSTMENT DISORDER WITH ANXIETY: ICD-10-CM

## 2022-01-31 ENCOUNTER — OFFICE VISIT (OUTPATIENT)
Dept: FAMILY MEDICINE CLINIC | Age: 52
End: 2022-01-31
Payer: COMMERCIAL

## 2022-01-31 VITALS
DIASTOLIC BLOOD PRESSURE: 68 MMHG | SYSTOLIC BLOOD PRESSURE: 158 MMHG | OXYGEN SATURATION: 97 % | WEIGHT: 315 LBS | RESPIRATION RATE: 12 BRPM | BODY MASS INDEX: 52.66 KG/M2 | TEMPERATURE: 98.6 F | HEART RATE: 108 BPM

## 2022-01-31 DIAGNOSIS — F33.1 MODERATE EPISODE OF RECURRENT MAJOR DEPRESSIVE DISORDER (HCC): ICD-10-CM

## 2022-01-31 DIAGNOSIS — R20.0 NUMBNESS OF RIGHT ANTERIOR THIGH: Primary | ICD-10-CM

## 2022-01-31 DIAGNOSIS — F43.10 POST TRAUMATIC STRESS DISORDER: ICD-10-CM

## 2022-01-31 DIAGNOSIS — E11.9 TYPE 2 DIABETES MELLITUS WITH HEMOGLOBIN A1C GOAL OF 7.0%-8.0% (HCC): ICD-10-CM

## 2022-01-31 DIAGNOSIS — F41.1 GAD (GENERALIZED ANXIETY DISORDER): ICD-10-CM

## 2022-01-31 DIAGNOSIS — M54.16 RIGHT LUMBAR RADICULOPATHY: ICD-10-CM

## 2022-01-31 PROCEDURE — 99214 OFFICE O/P EST MOD 30 MIN: CPT | Performed by: FAMILY MEDICINE

## 2022-01-31 RX ORDER — CLONAZEPAM 1 MG/1
TABLET ORAL
Qty: 90 TABLET | Refills: 0 | Status: SHIPPED | OUTPATIENT
Start: 2022-01-31 | End: 2022-03-17 | Stop reason: SDUPTHER

## 2022-01-31 ASSESSMENT — PATIENT HEALTH QUESTIONNAIRE - PHQ9
SUM OF ALL RESPONSES TO PHQ9 QUESTIONS 1 & 2: 1
SUM OF ALL RESPONSES TO PHQ QUESTIONS 1-9: 4
5. POOR APPETITE OR OVEREATING: 1
3. TROUBLE FALLING OR STAYING ASLEEP: 1
2. FEELING DOWN, DEPRESSED OR HOPELESS: 1
SUM OF ALL RESPONSES TO PHQ QUESTIONS 1-9: 4
SUM OF ALL RESPONSES TO PHQ QUESTIONS 1-9: 4
1. LITTLE INTEREST OR PLEASURE IN DOING THINGS: 0
4. FEELING TIRED OR HAVING LITTLE ENERGY: 1
SUM OF ALL RESPONSES TO PHQ QUESTIONS 1-9: 4

## 2022-01-31 NOTE — TELEPHONE ENCOUNTER
Requested Prescriptions     Pending Prescriptions Disp Refills    clonazePAM (KLONOPIN) 1 MG tablet [Pharmacy Med Name: clonazePAM 1 MG TABLET] 90 tablet      Sig: TAKE ONE TABLET BY MOUTH THREE TIMES A DAY AS NEEDED FOR ANXIETY     Last ov 11/1/21  Last lab  11/30/21

## 2022-01-31 NOTE — PROGRESS NOTES
[facility]  Progress Note  DO Ovi Schneider  1970    02/06/22    Chief Complaint:   Maira Stephens is a 45 y.o. male who is here for paperwork for disability and new problem of numbness on right anterior thigh        HPI:   Maryam Rankin has paperwork for me to complete regarding ongoing physical and mental disability. He tells me that he would find it difficult to continue work with his current employer due to bouts of panic attacks and PTSD. Additionally with his physical ailments he finds it difficult to do his work as well. New problem is numbness of left thigh which has been present for more than a week. Does not recall any injury. Typically does not wear tight fitting pants. Typically wears stretchable sweatpants. Additionally he is here to go over the last A1c of 12.7% checked on November 30. Tells me that he takes his medications as prescribed. He feels ready to consult with a endocrinologist as he and I are not able to find a way to control his blood sugars adequately. ROS negative for headache, visionchanges, chest pain, shortness of breath, abdominal pain, urinary sx, bowel changes. Past medical, surgical, and social history reviewed. and allergies reviewed. No Known Allergies  Prior to Visit Medications    Medication Sig Taking?  Authorizing Provider   clonazePAM (KLONOPIN) 1 MG tablet TAKE ONE TABLET BY MOUTH THREE TIMES A DAY AS NEEDED FOR ANXIETY Yes Romel Mceknzie, DO   diclofenac sodium (VOLTAREN) 1 % GEL Apply 4 g topically 4 times daily Yes Romel Mckenzie DO   prazosin (MINIPRESS) 5 MG capsule Take 1 capsule by mouth nightly Yes Romel Mckenzie DO   triamterene-hydroCHLOROthiazide (MAXZIDE-25) 37.5-25 MG per tablet Take 1 tablet by mouth once daily Yes Romel Mckenzie DO   cloNIDine (CATAPRES) 0.3 MG tablet Take 1 tablet by mouth twice daily Yes Romel Mckenzie DO   lisinopril (PRINIVIL;ZESTRIL) 40 MG tablet TAKE ONE TABLET BY MOUTH DAILY Yes Murray Mckenzie, DO   metFORMIN (GLUCOPHAGE-XR) 500 MG extended release tablet Take 4 tablets by mouth daily (with breakfast) Yes Murray Mckenzie DO   tamsulosin (FLOMAX) 0.4 MG capsule TAKE ONE CAPSULE BY MOUTH ONCE NIGHTLY Yes Murray Mckenzie DO   simvastatin (ZOCOR) 20 MG tablet TAKE ONE TABLET BY MOUTH EVERY EVENING Yes Murray Mckenzie DO   ARIPiprazole (ABILIFY) 10 MG tablet TAKE ONE TABLET BY MOUTH DAILY Yes Murray Mckenzie DO   glipiZIDE (GLUCOTROL XL) 10 MG extended release tablet Take 1 tablet by mouth daily Yes Tor Mckenzie, DO   amLODIPine (NORVASC) 5 MG tablet TAKE ONE TABLET BY MOUTH DAILY Yes Murray Mckenzie DO   nystatin (MYCOSTATIN) 178058 UNIT/GM powder Apply 3 times daily to the groin Yes Murray Mckenzie DO   COCONUT OIL PO Take by mouth Yes Historical Provider, MD   Cinnamon 500 MG CAPS Take by mouth Yes Historical Provider, MD MALDONADO OMEGA-3 KRILL OIL PO Take by mouth Yes Historical Provider, MD   aspirin EC 81 MG EC tablet Take 81 mg by mouth daily.  Yes Historical Provider, MD          Vitals:    01/31/22 1536   BP: (!) 158/68   Pulse: 108   Resp: 12   Temp: 98.6 °F (37 °C)   TempSrc: Temporal   SpO2: 97%   Weight: (!) 377 lb 9.6 oz (171.3 kg)      Wt Readings from Last 3 Encounters:   01/31/22 (!) 377 lb 9.6 oz (171.3 kg)   11/01/21 282 lb 9.6 oz (128.2 kg)   08/04/21 (!) 389 lb 6.4 oz (176.6 kg)     BP Readings from Last 3 Encounters:   01/31/22 (!) 158/68   11/01/21 138/74   08/04/21 130/84       Patient Active Problem List   Diagnosis    Hypertension    Moderate episode of recurrent major depressive disorder (HCC)    Right leg pain    Eczema    Right lumbar radiculopathy    Urinary urgency    Obstructive sleep apnea    Memory loss    Abdominal mass    Diabetes mellitus, type 2 ([address]    Abdominal wall hernia    Hyperlipidemia    Chest pain    Left ventricular hypertrophy    Left atrial dilation    Right atrial dilation    Right ventricular dilation    Syncope    Family history of premature coronary artery disease    Post traumatic stress disorder    Claustrophobia    Anemia    Right foot pain    Left foot pain    Right bundle branch block    Right ankle pain    BMI 50.0-59.9, adult ([facility])    Type 2 diabetes mellitus with hemoglobin A1c goal of 7.0%-8.0% ([facility])    Ankle arthritis    Congenital tarsal coalition    Osteoarthritis of subtalar joint    Osteoarthritis of right subtalar joint    Tarsal coalitions    Ankle abrasion with infection    Wound infection after surgery    Type 2 diabetes mellitus without complication ([facility]    Obesity    Insomnia due to mental disorder    RYAN (generalized anxiety disorder)       Immunization History   Administered Date(s) Administered    COVID-19, Pfizer Purple top, DILUTE for use, 12+ yrs, 30mcg/0.3mL dose 03/18/2021, 04/08/2021, 01/02/2022    Influenza Virus Vaccine 03/24/2016    Influenza, MDCK Quadv, IM, PF (Flucelvax 2 yrs and older) 11/01/2021    Influenza, Quadv, IM, (6 mo and older Fluzone, Flulaval, Fluarix and 3 yrs and older Afluria) 09/12/2019    Zoster Recombinant (Shingrix) 11/01/2021       Past Medical History:   Diagnosis Date    Abdominal wall hernia 2/21/2012    Anemia 4/15/2013    Claustrophobia 4/15/2013    Depression 3/8/2011    Diabetes mellitus, type 2 ([facility] 2/20/2012    Family history of premature coronary artery disease 6/19/2012    Hyperlipidemia 2/27/2012    Hypertension 3/8/2011    Insomnia 2/17/2012    Left atrial dilation 6/19/2012    Left ventricular hypertrophy 6/19/2012    Neuropathy     Obesity 3/8/2011    Obstructive sleep apnea 2/17/2012    uses Cpap sometimes    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    Post traumatic stress disorder 4/15/2013    Right atrial dilation 6/19/2012    Right bundle branch block 5/15/2014    Right ventricular dilation 6/19/2012     Past Surgical History:   Procedure Laterality Date    CARDIOVASCULAR STRESS TEST  May 28 2014    Non-Imaging Stress Test    CIRCUMCISION  03/2017    Due to phimosis: Dr. Liana Head Right 2/1/2016    ankle subtower fusion with bone graft using c-arm     Family History   Problem Relation Age of Onset    Heart Disease Mother     Coronary Art Dis Mother     Heart Attack Mother     Mental Illness Mother      Social History     Socioeconomic History    Marital status:      Spouse name: Shannan Proctor Number of children: 0    Years of education: Not on file    Highest education level: Not on file   Occupational History    Occupation:    Tobacco Use    Smoking status: Former Smoker     Packs/day: 0.25     Years: 5.00     Pack years: 1.25    Smokeless tobacco: Never Used    Tobacco comment: quit smoking about 2004   Substance and Sexual Activity    Alcohol use: No    Drug use: No    Sexual activity: Yes     Partners: Female     Comment:  - [address] [place]   Other Topics Concern    Not on file   Social History Narrative    Not on file     Social Determinants of Health     Financial Resource Strain: Low Risk     Difficulty of Paying Living Expenses: Not very hard   Food Insecurity: No Food Insecurity    Worried About Running Out of Food in the Last Year: Never true    Sylvia of Food in the Last Year: Never true   Transportation Needs: No Transportation Needs    Lack of Transportation (Medical): No    Lack of Transportation (Non-Medical):  No   Physical Activity:     Days of Exercise per Week: Not on file    Minutes of Exercise per Session: Not on file   Stress:     Feeling of Stress : Not on file   Social Connections:     Frequency of Communication with Friends and Family: Not on file    Frequency of Social Gatherings with Friends and Family: Not on file    Attends Worship Services: Not on file   [employer] of [employer]s or Organizations: Not on file    Attends Club or Organization Meetings: Not on file    Marital Status: Not on file   Intimate Partner Violence:     Fear of Current or Ex-Partner: Not on file    Emotionally Abused: Not on file    Physically Abused: Not on file    Sexually Abused: Not on file   Housing Stability:     Unable to Pay for Housing in the Last Year: Not on file    Number of [place] in the Last Year: Not on file    Unstable Housing in the Last Year: Not on file       O: BP (!) 158/68   Pulse 108   Temp 98.6 °F (37 °C) (Temporal)   Resp 12   Wt (!) 377 lb 9.6 oz (171.3 kg)   SpO2 97%   BMI 52.66 kg/m²   Physical Exam  GEN: No acute distress,cooperative, well nourished, alert. HEENT: PEERLA, EOMI , normocephalic/atraumatic, external nose appears normal.  External ear is normal.    Neck: soft, supple, no appreciable thyromegaly,mass  CV: No upper extremity edema. Resp:  Breathing comfortably. Psych:normal affect. Does not endorse SI/HI  Neuro: AOx3  Other Pertinent Physical Exam findings:   Gait is even. Lab Results   Component Value Date    LABA1C 12.7 11/30/2021     Lab Results   Component Value Date    .8 11/30/2021         ASSESSMENT   Diagnosis Orders   1. Numbness of right anterior thigh  diclofenac sodium (VOLTAREN) 1 % GEL    DISCONTINUED: diclofenac sodium (VOLTAREN) 1 % GEL   2. Type 2 diabetes mellitus with hemoglobin A1c goal of 7.0%-8.0% ([facility])     3. Right lumbar radiculopathy     4. RYAN (generalized anxiety disorder)     5. Moderate episode of recurrent major depressive disorder ([address]     6. Post traumatic stress disorder       #1:  Possible meralgia paresthetica. Risk factors include elevated BMI. The risks, benefits, potential side effects and barriers to medication use were addressed today. Understanding was acknowledged. Patient asked to follow-up if condition(s) do not improve as anticipated.   #2:  He is agreeable to see endocrinology for assistance on getting blood sugar better controlled. #3-6: Reasons noted of why paperwork is completed today. Keep follow-up with behavioral health consultant. PLAN          Time spent on encounter (including any number of the following: review of labs, imaging, provider notes, outside hospital records; performing examination/evaluation; counseling patient and family; ordering medications/tests; placing referrals and communication with referring physicians; coordination of care, and documentation in the EHR): 30 minutes  Established E/M: 10-19 (14942), 20-29 (59641), 30-39 (95452), 40-54 (03685)   New E/M: 15-29 (42059), 30-44 (04406), 45-59 (47734), 60-74 (55781)  Telephone E/M: 5-10 (Deonna), 11-20 (04284), 21-30 (51265)    If applicable, see additional patient information and instructions under \"Patient Instructions. \"    No follow-ups on file. Patient Instructions     KEEP  [facility]    Patient Education        Meralgia Paresthetica: Care Instructions  Your Care Instructions  Meralgia paresthetica (say \"muh-RAL-juh rwk-lbl-CYEK-ick-uh\") is pain and numbness in the outer part of your thigh. The pain might get worse after you walk or stand for a long time. This pain and numbness occur when a nerve in your thigh is pinched (compressed). Sometimes the problem is caused by wearing tight clothing or being overweight. Most of the time the problem goes away on its own in a few months. Lowering any pressure on the thigh area may help. Wear loose clothes, and lose weight if you need to. Follow-up care is a key part of your treatment and safety. Be sure to make and go to all appointments, and call your doctor if you are having problems. It's also a good idea to know your test results and keep a list of the medicines you take. How can you care for yourself at home? · Most times the problem gets better on its own. Try wearing loose clothing to see if this helps.   · Lose weight if you need to. Talk with your doctor if you need help. When should you call for help? Watch closely for changes in your health, and be sure to contact your doctor if:    · You have new symptoms, such as pain that gets worse or new numbness in your thigh.     · You do not get better as expected. Where can you learn more? Go to https://Certus Grouppepiceweb.[email or web address]. org and sign in to your Movity account. Enter D693 in the ideaForge box to learn more about \"Meralgia Paresthetica: Care Instructions. \"     If you do not have an account, please click on the \"Sign Up Now\" link. Current as of: April 8, 2021               Content Version: 13.1  © 9250-8102 Healthwise, Incorporated. Care instructions adapted under license by [facility] ([facility]). If you have questions about a medical condition or this instruction, always ask your healthcare professional. [address] any warranty or liability for your use of this information. Please note a portion of this chart was generated using dragon dictation software. Although every effort was made to ensure the accuracy of this automated transcription,some errors in transcription may have occurred.

## 2022-01-31 NOTE — PATIENT INSTRUCTIONS
KEEP UP PARTNERSHIP WITH [facility] [employer] WITH JODIE ROXY    Patient Education        Meralgia Paresthetica: Care Instructions  Your Care Instructions  Meralgia paresthetica (say \"muh-RAL-juh btu-gwe-BCFE-ick-uh\") is pain and numbness in the outer part of your thigh. The pain might get worse after you walk or stand for a long time. This pain and numbness occur when a nerve in your thigh is pinched (compressed). Sometimes the problem is caused by wearing tight clothing or being overweight. Most of the time the problem goes away on its own in a few months. Lowering any pressure on the thigh area may help. Wear loose clothes, and lose weight if you need to. Follow-up care is a key part of your treatment and safety. Be sure to make and go to all appointments, and call your doctor if you are having problems. It's also a good idea to know your test results and keep a list of the medicines you take. How can you care for yourself at home? · Most times the problem gets better on its own. Try wearing loose clothing to see if this helps. · Lose weight if you need to. Talk with your doctor if you need help. When should you call for help? Watch closely for changes in your health, and be sure to contact your doctor if:    · You have new symptoms, such as pain that gets worse or new numbness in your thigh.     · You do not get better as expected. Where can you learn more? Go to https://AirPR.SkyBitz. org and sign in to your [employer] account. Enter P793 in the [facility] box to learn more about \"Meralgia Paresthetica: Care Instructions. \"     If you do not have an account, please click on the \"Sign Up Now\" link. Current as of: April 8, 2021               Content Version: 13.1  © 1996-4079 Healthwise, Incorporated. Care instructions adapted under license by [facility] ([facility]).  If you have questions about a medical condition or this instruction, always ask your healthcare professional. Mackenzie Bejarano[employer] Incorporated disclaims any warranty or liability for your use of this information.

## 2022-02-07 DIAGNOSIS — I10 ESSENTIAL HYPERTENSION: Chronic | ICD-10-CM

## 2022-02-07 RX ORDER — CLONIDINE HYDROCHLORIDE 0.3 MG/1
TABLET ORAL
Qty: 60 TABLET | Refills: 0 | Status: SHIPPED | OUTPATIENT
Start: 2022-02-07 | End: 2022-03-16

## 2022-02-07 RX ORDER — TRIAMTERENE AND HYDROCHLOROTHIAZIDE 37.5; 25 MG/1; MG/1
TABLET ORAL
Qty: 30 TABLET | Refills: 0 | Status: SHIPPED | OUTPATIENT
Start: 2022-02-07 | End: 2022-03-16

## 2022-02-07 NOTE — TELEPHONE ENCOUNTER
Requested Prescriptions     Pending Prescriptions Disp Refills    triamterene-hydroCHLOROthiazide (MAXZIDE-25) 37.5-25 MG per tablet [Pharmacy Med Name: Triamterene-HCTZ 37.5-25 MG Oral Tablet] 30 tablet 0     Sig: Take 1 tablet by mouth once daily    cloNIDine (CATAPRES) 0.3 MG tablet [Pharmacy Med Name: cloNIDine HCl 0.3 MG Oral Tablet] 60 tablet 0     Sig: Take 1 tablet by mouth twice daily       LOV 1/31/2022  No f/u  Labs 11/30/21

## 2022-02-10 ENCOUNTER — VIRTUAL VISIT (OUTPATIENT)
Dept: PSYCHOLOGY | Age: 52
End: 2022-02-10
Payer: COMMERCIAL

## 2022-02-10 DIAGNOSIS — F43.10 PTSD (POST-TRAUMATIC STRESS DISORDER): Primary | ICD-10-CM

## 2022-02-10 PROCEDURE — 90832 PSYTX W PT 30 MINUTES: CPT | Performed by: PSYCHOLOGIST

## 2022-02-10 NOTE — PROGRESS NOTES
Behavioral Health Consultation  Yeimy Parson Psy.D. Psychologist  2/10/2022  8:30-9 AM      Time spent with Patient: 30 minutes  This is patient's sixth [facility] appointment. Reason for Consult: PTSD  Referring Provider: DO Jayla Villasenor [facility]    TELEHEALTH VISIT -- Audio/Visual (During GKNIN-21 public health emergency)  }  Pursuant to the emergency declaration under the [address] [place] [address], [facility] waiver authority and the [employer] Act, this Virtual Visit was conducted, with patient's consent, to reduce the patient's risk of exposure to COVID-19 and provide continuity of care for an established patient. Services were provided through a video synchronous discussion virtually to substitute for in-person clinic visit. Pt gave verbal informed consent to participate in telehealth services. Conducted a risk-benefit analysis and determined that the patient's presenting problems are consistent with the use of telepsychology. Determined that the patient has sufficient knowledge and skills in the use of technology enabling them to adequately benefit from telepsychology. It was determined that this patient was able to be properly treated without an in-person session. Patient verified that they were currently located at the address that was provided during registration.     Verified the following information:  Patient's identification: Yes  Patient location: [address] [place] [address]  Patient's call back number: [phone]  Patient's emergency contact's name and number, as well as permission to contact them if needed:  Extended Emergency Contact Information  Primary Emergency Contact: Laverne Vasquez  Address: [address] [place] [address] Phone: [phone]  Mobile Phone: [phone]  Relation: Spouse    Provider location: PHQ2 Score 1 3 6 -   PHQ9 Score 4 16 23 14     Interpretation of Total Score Depression Severity: 1-4 = Minimal depression, 5-9 = Mild depression, 10-14 = Moderate depression, 15-19 = Moderately severe depression, 20-27 = Severe depression    Diagnosis:    1. PTSD (post-traumatic stress disorder)        Patient Active Problem List   Diagnosis    Hypertension    Moderate episode of recurrent major depressive disorder ([facility])    Right leg pain    Eczema    Right lumbar radiculopathy    Urinary urgency    Obstructive sleep apnea    Memory loss    Abdominal mass    Diabetes mellitus, type 2 ([facility])    Abdominal wall hernia    Hyperlipidemia    Chest pain    Left ventricular hypertrophy    Left atrial dilation    Right atrial dilation    Right ventricular dilation    Syncope    Family history of premature coronary artery disease    Post traumatic stress disorder    Claustrophobia    Anemia    Right foot pain    Left foot pain    Right bundle branch block    Right ankle pain    BMI 50.0-59.9, adult ([facility])    Type 2 diabetes mellitus with hemoglobin A1c goal of 7.0%-8.0% ([facility])    Ankle arthritis    Congenital tarsal coalition    Osteoarthritis of subtalar joint    Osteoarthritis of right subtalar joint    Tarsal coalitions    Ankle abrasion with infection    Wound infection after surgery    Type 2 diabetes mellitus without complication ([facility])    Obesity    Insomnia due to mental disorder    RYAN (generalized anxiety disorder)         Plan:  Pt interventions:  Supportive techniques, Emphasized self-care as important for managing overall health, Cognitive strategies to target balanced thinking and Discussed psychotropic medications. Pt Behavioral Change Plan:  Pt set the following goals:  1. Consider reading The Body Keeps the Score by Lilli Roche    Pt may call to schedule F/U visits as needed in the future.

## 2022-02-16 ENCOUNTER — TELEPHONE (OUTPATIENT)
Dept: FAMILY MEDICINE CLINIC | Age: 52
End: 2022-02-16

## 2022-02-16 NOTE — TELEPHONE ENCOUNTER
Ovi's wife called asking if the paperwork that was left with Dr. Misha Gupta has been filled out. I checked to see if we had anything on file but did not find anything. She asked that Jameel Tran be notified once the papers are complete.     [phone] (home) [phone] (work)

## 2022-02-17 NOTE — TELEPHONE ENCOUNTER
Informed the patient his papers are completed as noted by Dr. Nicholas Almaguer to know if he can come pick them up today?      Thank you

## 2022-02-18 ENCOUNTER — TELEPHONE (OUTPATIENT)
Dept: FAMILY MEDICINE CLINIC | Age: 52
End: 2022-02-18

## 2022-02-18 NOTE — TELEPHONE ENCOUNTER
Left message for Jose Martin Song to return call to the office. Please refer to Dr. Lynda Espinoza message    Offer either an add on 12 pm or 12:40 pm on Monday Feb 21.

## 2022-02-18 NOTE — TELEPHONE ENCOUNTER
Pt spouse called stating pt is having back pain down left leg. The cream that was given is no longer helping. Would like to know if the doctor could fit him in next week?     Thank you

## 2022-02-21 ENCOUNTER — OFFICE VISIT (OUTPATIENT)
Dept: FAMILY MEDICINE CLINIC | Age: 52
End: 2022-02-21
Payer: COMMERCIAL

## 2022-02-21 ENCOUNTER — TELEPHONE (OUTPATIENT)
Dept: FAMILY MEDICINE CLINIC | Age: 52
End: 2022-02-21

## 2022-02-21 VITALS
RESPIRATION RATE: 16 BRPM | BODY MASS INDEX: 44.1 KG/M2 | SYSTOLIC BLOOD PRESSURE: 134 MMHG | HEIGHT: 71 IN | OXYGEN SATURATION: 96 % | DIASTOLIC BLOOD PRESSURE: 80 MMHG | WEIGHT: 315 LBS | HEART RATE: 104 BPM | TEMPERATURE: 97.3 F

## 2022-02-21 DIAGNOSIS — M54.16 RIGHT LUMBAR RADICULOPATHY: ICD-10-CM

## 2022-02-21 DIAGNOSIS — M19.079 ANKLE ARTHRITIS: ICD-10-CM

## 2022-02-21 DIAGNOSIS — F43.10 POST TRAUMATIC STRESS DISORDER: ICD-10-CM

## 2022-02-21 DIAGNOSIS — M19.071 OSTEOARTHRITIS OF RIGHT SUBTALAR JOINT: ICD-10-CM

## 2022-02-21 DIAGNOSIS — M54.32 LEFT SIDED SCIATICA: Primary | ICD-10-CM

## 2022-02-21 PROCEDURE — 99214 OFFICE O/P EST MOD 30 MIN: CPT | Performed by: FAMILY MEDICINE

## 2022-02-21 RX ORDER — MELOXICAM 15 MG/1
15 TABLET ORAL DAILY PRN
Qty: 10 TABLET | Refills: 0 | Status: SHIPPED | OUTPATIENT
Start: 2022-02-21 | End: 2022-02-28

## 2022-02-21 RX ORDER — OXYCODONE HYDROCHLORIDE AND ACETAMINOPHEN 5; 325 MG/1; MG/1
1 TABLET ORAL EVERY 6 HOURS PRN
Qty: 12 TABLET | Refills: 0 | Status: SHIPPED | OUTPATIENT
Start: 2022-02-21 | End: 2022-02-24

## 2022-02-21 NOTE — PATIENT INSTRUCTIONS
--You can get your lab work or x-ray done at [facility]. LAB: [address]  Lab hours (7AM - 5PM Monday through Friday; 8AM - noon on Saturdays),   Ph# [phone]    X-RAY: [address]  Radiology hours, (7:00AM - 5PM Monday through Friday only)  Ph# [phone] or [facility]  --Call our office in 1 week if you have not heard about the results. Or check [employer] if you have previously enrolled. Your doctor will be part of [employer] until March 11. You are certainly welcome to continue your healthcare needs with Dr. Ailyn Hadley until then. To find a different primary care provider consider calling the \"find a doctor line\" at [phone]    -or-    --go to [employer]  --enter your location as your home zip code  --click \"save my location\"  --click \"find a doctor\"  --click \"Primary Care\"  --check out the reviews on the physician, nurse practitioner, or physician assistant that you are interested in.  --make sure that the provider is taking new patients (there should be a message on the right of the screen \"New Patient availability within. Veena Peña Veena Peña Veena Peña \"  --click \"book on-line. \"      --follow the prompts    -or-    Schedule an appointment in order to spend time for Dr. Ailyn Hadley to assist you in finding a good replacement match  --Using an active [employer] account  --Logging on [employer], searching \"[place]\" and setting up appointment  --or calling the scheduling line at [phone]. If appointment times are not available for your preferred day and time, then call the clinic back at [phone] or send a [employer] message and the clinic team will assist.    Thank you.     Dr. Ailyn Hadley

## 2022-02-21 NOTE — PROGRESS NOTES
[facility]  Progress Note  Lili Carolinaekaterina Mikey Crabtreealice  1970    03/06/22    Chief Complaint:   Oswaldo Morin is a 45 y.o. male who is here for chronic pain        HPI:   Left-sided sciatica is getting worse. Denies any alarm features of loss of  bladder or bowel control. Denies any saddle anesthesia. CHRONIC PAIN REVIEW & ASSESSMENT:  Due to chronic/continued usage of narcotic pain medications, the following questions/discussion occurred. 1) How are you doing in performing your activities of daily living? Well. 2) Any side effects of your medications? no  3) Does your pain medicine prove adequate analgesia/pain control? Yes  4) Any aberrant behavior to warrant concern? no     Progress with treatment goals:   --opiate medications are part of an overall plan for patient rehabilitation, including ability to perform a home exercise program and perform necessary activities of daily living.   -- Oswaldo Morin continues to experience relief with combination of opiate medications and home exercise program and is able to maintain activities and ability to function. Based on an analysis of risks, benefits, and alternatives, continuation of prescription of opiates appears appropriate for management of her chronic pain. Reason/medically necessity for prescription of more than one controlled substance: Yes. Discussed that other medications such as benzodiazepines, sedative-hypnotics, or barbiturates may interact with opiates. Potential risks include increased opiate toxicity, increased sleep apnea risk, and increased risk of overdose deaths and other potential adverse effects. The patient should keep all physicians informed of all medications. ROS negative for headache, visionchanges, chest pain, shortness of breath, abdominal pain, urinary sx, bowel changes. Past medical, surgical, and social history reviewed. and allergies reviewed.     No Known Allergies  Prior to Visit Medications    Medication Sig Taking? Authorizing Provider   triamterene-hydroCHLOROthiazide (MAXZIDE-25) 37.5-25 MG per tablet Take 1 tablet by mouth once daily Yes Catherine Hare, DO   cloNIDine (CATAPRES) 0.3 MG tablet Take 1 tablet by mouth twice daily Yes Soraya Foots Fredigcang, DO   clonazePAM (KLONOPIN) 1 MG tablet TAKE ONE TABLET BY MOUTH THREE TIMES A DAY AS NEEDED FOR ANXIETY Yes Soraya Foots Fredigcang, DO   diclofenac sodium (VOLTAREN) 1 % GEL Apply 4 g topically 4 times daily Yes Catherine Hare, DO   prazosin (MINIPRESS) 5 MG capsule Take 1 capsule by mouth nightly Yes Catherine Hare, DO   lisinopril (PRINIVIL;ZESTRIL) 40 MG tablet TAKE ONE TABLET BY MOUTH DAILY Yes Soraya Foots Fredigcang, DO   metFORMIN (GLUCOPHAGE-XR) 500 MG extended release tablet Take 4 tablets by mouth daily (with breakfast) Yes Soraya Foots Bingcang, DO   tamsulosin (FLOMAX) 0.4 MG capsule TAKE ONE CAPSULE BY MOUTH ONCE NIGHTLY Yes Soraya Foots Fredigcang, DO   simvastatin (ZOCOR) 20 MG tablet TAKE ONE TABLET BY MOUTH EVERY EVENING Yes Soraya Foots Bingcang, DO   ARIPiprazole (ABILIFY) 10 MG tablet TAKE ONE TABLET BY MOUTH DAILY Yes [facility] Bingcang, DO   glipiZIDE (GLUCOTROL XL) 10 MG extended release tablet Take 1 tablet by mouth daily Yes Tor Cedenong, DO   amLODIPine (NORVASC) 5 MG tablet TAKE ONE TABLET BY MOUTH DAILY Yes [facility] Bingcang, DO   nystatin (MYCOSTATIN) 897720 UNIT/GM powder Apply 3 times daily to the groin Yes Soraya Foots Fredigcang, DO   COCONUT OIL PO Take by mouth Yes Historical Provider, MD   Cinnamon 500 MG CAPS Take by mouth Yes Historical Provider, MD MALDONADO OMEGA-3 KRILL OIL PO Take by mouth Yes Historical Provider, MD   aspirin EC 81 MG EC tablet Take 81 mg by mouth daily.  Yes Historical Provider, MD   meloxicam (MOBIC) 15 MG tablet TAKE ONE TABLET BY MOUTH DAILY AS NEEDED FOR PAIN  Soraya Foots Fredigcang, DO          Vitals:    02/21/22 1240   BP: 134/80   Pulse: 104   Resp: 16   Temp: 97.3 °F (36.3 °C)   TempSrc: Temporal   SpO2: 96%   Weight: (!) 375 lb 12.8 oz (170.5 kg)   Height: 5' 11\" (1.803 m)      Wt Readings from Last 3 Encounters:   02/21/22 (!) 375 lb 12.8 oz (170.5 kg)   01/31/22 (!) 377 lb 9.6 oz (171.3 kg)   11/01/21 282 lb 9.6 oz (128.2 kg)     BP Readings from Last 3 Encounters:   02/21/22 134/80   01/31/22 (!) 158/68   11/01/21 138/74       Patient Active Problem List   Diagnosis    Hypertension    Moderate episode of recurrent major depressive disorder (HCC)    Right leg pain    Eczema    Right lumbar radiculopathy    Urinary urgency    Obstructive sleep apnea    Memory loss    Abdominal mass    Diabetes mellitus, type 2 (HCC)    Abdominal wall hernia    Hyperlipidemia    Chest pain    Left ventricular hypertrophy    Left atrial dilation    Right atrial dilation    Right ventricular dilation    Syncope    Family history of premature coronary artery disease    Post traumatic stress disorder    Claustrophobia    Anemia    Right foot pain    Left foot pain    Right bundle branch block    Right ankle pain    BMI 50.0-59.9, adult (HCC)    Type 2 diabetes mellitus with hemoglobin A1c goal of 7.0%-8.0% ([facility])    Ankle arthritis    Congenital tarsal coalition    Osteoarthritis of subtalar joint    Osteoarthritis of right subtalar joint    Tarsal coalitions    Ankle abrasion with infection    Wound infection after surgery    Type 2 diabetes mellitus without complication ([facility] [address]    Obesity    Insomnia due to mental disorder    RYAN (generalized anxiety disorder)       Immunization History   Administered Date(s) Administered    COVID-19, Pfizer Purple top, DILUTE for use, 12+ yrs, 30mcg/0.3mL dose 03/18/2021, 04/08/2021, 01/02/2022    Influenza Virus Vaccine 03/24/2016    Influenza, MDCK Quadv, IM, PF (Flucelvax 2 yrs and older) 11/01/2021    Influenza, Quadv, IM, (6 mo and older Fluzone, Flulaval, Fluarix and 3 yrs and older Afluria) 09/12/2019    Zoster Recombinant (Shingrix) 11/01/2021       Past Medical History:   Diagnosis Date    Abdominal wall hernia 2/21/2012    Anemia 4/15/2013    Claustrophobia 4/15/2013    Depression 3/8/2011    Diabetes mellitus, type 2 ([address] 2/20/2012    Family history of premature coronary artery disease 6/19/2012    Hyperlipidemia 2/27/2012    Hypertension 3/8/2011    Insomnia 2/17/2012    Left atrial dilation 6/19/2012    Left ventricular hypertrophy 6/19/2012    Neuropathy     Obesity 3/8/2011    Obstructive sleep apnea 2/17/2012    uses Cpap sometimes    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    Post traumatic stress disorder 4/15/2013    Right atrial dilation 6/19/2012    Right bundle branch block 5/15/2014    Right ventricular dilation 6/19/2012     Past Surgical History:   Procedure Laterality Date    CARDIOVASCULAR STRESS TEST  May 28 2014    Non-Imaging Stress Test    CIRCUMCISION  03/2017    Due to phimosis: Dr. Darnell Marvin Right 2/1/2016    ankle subtower fusion with bone graft using c-arm     Family History   Problem Relation Age of Onset    Heart Disease Mother     Coronary Art Dis Mother     Heart Attack Mother     Mental Illness Mother      Social History     Socioeconomic History    Marital status:      Spouse name: Shannon Edwards Number of children: 0    Years of education: Not on file    Highest education level: Not on file   Occupational History    Occupation:    Tobacco Use    Smoking status: Former Smoker     Packs/day: 0.25     Years: 5.00     Pack years: 1.25    Smokeless tobacco: Never Used    Tobacco comment: quit smoking about 2004   Substance and Sexual Activity    Alcohol use: No    Drug use: No    Sexual activity: Yes     Partners: Female     Comment:  - [address] [place]   Other Topics Concern    Not on file   Social History Narrative    Not on file     Social Determinants of Health     Financial Resource Strain: Low Risk     Difficulty of Paying Living Expenses: Not very hard   Food Insecurity: No Food Insecurity    Worried About Running Out of Food in the Last Year: Never true    Sylvia of Food in the Last Year: Never true   Transportation Needs: No Transportation Needs    Lack of Transportation (Medical): No    Lack of Transportation (Non-Medical): No   Physical Activity:     Days of Exercise per Week: Not on file    Minutes of Exercise per Session: Not on file   Stress:     Feeling of Stress : Not on file   Social Connections:     Frequency of Communication with Friends and Family: Not on file    Frequency of Social Gatherings with Friends and Family: Not on file    Attends Bahai Services: Not on file    Active Member of [address] [place] [address] [employer] or Organizations: Not on file    Attends Club or Organization Meetings: Not on file    Marital Status: Not on file   Intimate Partner Violence:     Fear of Current or Ex-Partner: Not on file    Emotionally Abused: Not on file    Physically Abused: Not on file    Sexually Abused: Not on file   Housing Stability:     Unable to Pay for Housing in the Last Year: Not on file    Number of [place] in the Last Year: Not on file    Unstable Housing in the Last Year: Not on file       O: /80   Pulse 104   Temp 97.3 °F (36.3 °C) (Temporal)   Resp 16   Ht 5' 11\" (1.803 m)   Wt (!) 375 lb 12.8 oz (170.5 kg)   SpO2 96%   BMI 52.41 kg/m²   Physical Exam  GEN: No acute distress,cooperative, well nourished, alert. HEENT: PEERLA, EOMI , normocephalic/atraumatic, external nose appears normal.  External ear is normal.    Neck: soft, supple, no appreciable thyromegaly,mass  CV: No upper extremity edema. Resp:  Breathing comfortably. Psych:normal affect. Neuro: AOx3  Other Pertinent Physical Exam findings: Heart: Normal S1 and S2 with regular rhythm.   Lungs: Clear to auscultation bilaterally. ASSESSMENT   Diagnosis Orders   1. Left sided sciatica  oxyCODONE-acetaminophen (PERCOCET) 5-325 MG per tablet    XR LUMBAR SPINE (2-3 VIEWS)    External Referral To Physical Therapy    DISCONTINUED: meloxicam (MOBIC) 15 MG tablet   2. Right lumbar radiculopathy  oxyCODONE-acetaminophen (PERCOCET) 5-325 MG per tablet    External Referral To Physical Therapy   3. Osteoarthritis of right subtalar joint  oxyCODONE-acetaminophen (PERCOCET) 5-325 MG per tablet    External Referral To Physical Therapy   4. Post traumatic stress disorder  External Referral To Physical Therapy   5. Ankle arthritis  External Referral To Physical Therapy     #1: not controlled. Proceed with x-ray. #2-3: The current medical regimen is effective;  continue present plan and medications. Pt aware of need for every 3 month medication followup appointments, and that medication refills for benzodiazepines, narcotics and/or stimulants will only be given at appointment. The risks, benefits, potential side effects and barriers to medication use were addressed today. Understanding was acknowledged. Patient asked to follow-up if condition(s) do not improve as anticipated. #4-5: proceed with PT. PLAN          Time spent on encounter (including any number of the following: review of labs, imaging, provider notes, outside hospital records; performing examination/evaluation; counseling patient and family; ordering medications/tests; placing referrals and communication with referring physicians; coordination of care, and documentation in the EHR): 30 minutes  Established E/M: 10-19 (12398), 20-29 (67200), 30-39 (88760), 40-54 (38482)   New E/M: 15-29 (44289), 30-44 (65814), 45-59 (13971), 60-74 (17655)  Telephone E/M: 5-10 ([email or web address]), 11-20 (21232), 21-30 (74105)    If applicable, see additional patient information and instructions under \"Patient Instructions. \"    Return for Diabetes, Chronic Pain, Back pain w replacement PCP in 1-2 months. .  Patient Instructions     --You can get your lab work or x-ray done at [facility]. LAB: [address]  Lab hours (7AM - 5PM Monday through Friday; 8AM - noon on Saturdays),   Ph# [phone]    X-RAY: [address]  Radiology hours, (7:00AM - 5PM Monday through Friday only)  Ph# [phone] or [phone]  --Call our office in 1 week if you have not heard about the results. Or check [employer] if you have previously enrolled. Your doctor will be part of [facility] until March 11. You are certainly welcome to continue your healthcare needs with Dr. Alec Madden until then. To find a different primary care provider consider calling the \"find a doctor line\" at [phone]    -or-    --go to [employer]  --enter your location as your home zip code  --click \"save my location\"  --click \"find a doctor\"  --click \"Primary Care\"  --check out the reviews on the physician, nurse practitioner, or physician assistant that you are interested in.  --make sure that the provider is taking new patients (there should be a message on the right of the screen \"New Patient availability within. [facility] [place] [facility] \"  --click \"book on-line. \"      --follow the prompts    -or-    Schedule an appointment in order to spend time for Dr. Alec Madden to assist you in finding a good replacement match  --Using an active E[employer]Box account  --Logging on [employer], searching \"Bingcang\" and setting up appointment  --or calling the scheduling line at [phone]. If appointment times are not available for your preferred day and time, then call the clinic back at [phone] or send a EadBox message and the clinic team will assist.    Thank you. Dr. Alec Madden          Please note a portion of this chart was generated using dragon dictation software. Although every effort was made to ensure the accuracy of this automated transcription,some errors in transcription may have occurred.

## 2022-02-27 DIAGNOSIS — M54.32 LEFT SIDED SCIATICA: ICD-10-CM

## 2022-02-28 ENCOUNTER — TELEPHONE (OUTPATIENT)
Dept: FAMILY MEDICINE CLINIC | Age: 52
End: 2022-02-28

## 2022-02-28 RX ORDER — MELOXICAM 15 MG/1
TABLET ORAL
Qty: 10 TABLET | Refills: 0 | Status: SHIPPED | OUTPATIENT
Start: 2022-02-28 | End: 2022-03-09

## 2022-02-28 NOTE — TELEPHONE ENCOUNTER
Requested Prescriptions     Pending Prescriptions Disp Refills    meloxicam (MOBIC) 15 MG tablet [Pharmacy Med Name: MELOXICAM 15 MG TABLET] 10 tablet 0     Sig: TAKE ONE TABLET BY MOUTH DAILY AS NEEDED FOR PAIN     Last ov 2/21/22  Last lab 11/30/21

## 2022-03-09 DIAGNOSIS — M54.32 LEFT SIDED SCIATICA: ICD-10-CM

## 2022-03-09 RX ORDER — MELOXICAM 15 MG/1
TABLET ORAL
Qty: 10 TABLET | Refills: 0 | Status: SHIPPED | OUTPATIENT
Start: 2022-03-09 | End: 2022-06-08 | Stop reason: ALTCHOICE

## 2022-03-16 DIAGNOSIS — I10 ESSENTIAL HYPERTENSION: Chronic | ICD-10-CM

## 2022-03-16 RX ORDER — CLONIDINE HYDROCHLORIDE 0.3 MG/1
TABLET ORAL
Qty: 60 TABLET | Refills: 0 | Status: SHIPPED | OUTPATIENT
Start: 2022-03-16 | End: 2022-04-27

## 2022-03-16 RX ORDER — TRIAMTERENE AND HYDROCHLOROTHIAZIDE 37.5; 25 MG/1; MG/1
TABLET ORAL
Qty: 30 TABLET | Refills: 0 | Status: SHIPPED | OUTPATIENT
Start: 2022-03-16 | End: 2022-04-27

## 2022-03-17 ENCOUNTER — OFFICE VISIT (OUTPATIENT)
Dept: FAMILY MEDICINE CLINIC | Age: 52
End: 2022-03-17
Payer: COMMERCIAL

## 2022-03-17 VITALS
BODY MASS INDEX: 53.14 KG/M2 | TEMPERATURE: 97.1 F | WEIGHT: 315 LBS | SYSTOLIC BLOOD PRESSURE: 139 MMHG | DIASTOLIC BLOOD PRESSURE: 80 MMHG | HEART RATE: 77 BPM | OXYGEN SATURATION: 97 %

## 2022-03-17 DIAGNOSIS — F43.22 ADJUSTMENT DISORDER WITH ANXIETY: ICD-10-CM

## 2022-03-17 DIAGNOSIS — Z12.11 SCREEN FOR COLON CANCER: ICD-10-CM

## 2022-03-17 DIAGNOSIS — E11.9 TYPE 2 DIABETES MELLITUS WITHOUT COMPLICATION, WITHOUT LONG-TERM CURRENT USE OF INSULIN (HCC): Primary | ICD-10-CM

## 2022-03-17 DIAGNOSIS — E11.9 TYPE 2 DIABETES MELLITUS WITH HEMOGLOBIN A1C GOAL OF 7.0%-8.0% (HCC): ICD-10-CM

## 2022-03-17 LAB — HBA1C MFR BLD: 12.8 %

## 2022-03-17 PROCEDURE — 3046F HEMOGLOBIN A1C LEVEL >9.0%: CPT | Performed by: NURSE PRACTITIONER

## 2022-03-17 PROCEDURE — 83036 HEMOGLOBIN GLYCOSYLATED A1C: CPT | Performed by: NURSE PRACTITIONER

## 2022-03-17 PROCEDURE — 99215 OFFICE O/P EST HI 40 MIN: CPT | Performed by: NURSE PRACTITIONER

## 2022-03-17 RX ORDER — GLUCOSAMINE HCL/CHONDROITIN SU 500-400 MG
CAPSULE ORAL
Qty: 100 STRIP | Refills: 5 | Status: SHIPPED | OUTPATIENT
Start: 2022-03-17 | End: 2022-06-08

## 2022-03-17 RX ORDER — CLONAZEPAM 1 MG/1
TABLET ORAL
Qty: 90 TABLET | Refills: 0 | Status: SHIPPED | OUTPATIENT
Start: 2022-03-17 | End: 2022-05-23

## 2022-03-17 RX ORDER — FLASH GLUCOSE SENSOR
1 KIT MISCELLANEOUS
Qty: 6 EACH | Refills: 5 | Status: SHIPPED | OUTPATIENT
Start: 2022-03-17

## 2022-03-17 RX ORDER — INSULIN ASPART 100 [IU]/ML
4 INJECTION, SOLUTION INTRAVENOUS; SUBCUTANEOUS
Qty: 5 PEN | Refills: 3 | Status: SHIPPED | OUTPATIENT
Start: 2022-03-17 | End: 2022-04-06

## 2022-03-17 RX ORDER — PEN NEEDLE, DIABETIC 31 G X1/4"
1 NEEDLE, DISPOSABLE MISCELLANEOUS DAILY
Qty: 100 EACH | Refills: 3 | Status: SHIPPED | OUTPATIENT
Start: 2022-03-17 | End: 2022-06-08

## 2022-03-17 RX ORDER — INSULIN GLARGINE 100 [IU]/ML
10 INJECTION, SOLUTION SUBCUTANEOUS NIGHTLY
Qty: 5 PEN | Refills: 0 | Status: SHIPPED | OUTPATIENT
Start: 2022-03-17 | End: 2022-03-30 | Stop reason: DRUGHIGH

## 2022-03-17 ASSESSMENT — ENCOUNTER SYMPTOMS
BACK PAIN: 1
SHORTNESS OF BREATH: 1
GASTROINTESTINAL NEGATIVE: 1
EYES NEGATIVE: 1

## 2022-03-17 NOTE — PROGRESS NOTES
Bruce Reed (:  1970) is a 45 y.o. male,Established patient, here for evaluation of the following chief complaint(s):  Diabetes      ASSESSMENT/PLAN:  1. Type 2 diabetes mellitus without complication, without long-term current use of insulin ([facility])  -     POCT glycosylated hemoglobin (Hb A1C)  -     insulin glargine (BASAGLAR KWIKPEN) 100 UNIT/ML injection pen; Inject 10 Units into the skin nightly Increase by 2 units every 2 nights until morning sugar is 120 then maintain that dose, Disp-5 pen, R-0Normal  -     blood glucose monitor strips; Test three times a day & as needed for symptoms of irregular blood glucose. Dispense sufficient amount for indicated testing frequency plus additional to accommodate PRN testing needs. , Disp-100 strip, R-5, Normal  -     Insulin Pen Needle ([employer] PEN NEEDLES) 31G X 6 MM MISC; DAILY Starting Thu 3/17/2022, Disp-100 each, R-3, Normal  2. Adjustment disorder with anxiety  -     clonazePAM (KLONOPIN) 1 MG tablet; TAKE ONE TABLET BY MOUTH THREE TIMES A DAY AS NEEDED FOR ANXIETY, Disp-90 tablet, R-0Normal  3. Screen for colon cancer  -     Dulce Plunkett MD, Gastroenterology, [place]  4. Type 2 diabetes mellitus with hemoglobin A1c goal of 7.0%-8.0% ([facility])  Assessment & Plan:  A1C 12.4  Cont metformin  Cont Glucotrol  Freestyle Brice placed and instructed, johnie uploaded to phone  Start Basaglar Trenda James sample provided) 10U at [facility] and increase as directed  Start Novolog 4u with SSI at each meal. Pt has SSI instructions. Will follow up in 2 weeks with sugars. 5. BMI 50.0-59.9, adult [facility])  Assessment & Plan:  Long discussion regarding carbs  Will attempt to decrease over next month with 10# weight loss goal      Return in about 4 weeks (around 2022). SUBJECTIVE/OBJECTIVE:  Here to establish with our practice  DM2- A1C 12- only on metformin and glucotrol. Not checking sugars.  Likes potatos  chronic back pain- seeing [facility] tomorrow for eval  DM2- A1C 12.0- seeing endocrine in April  Anxiety- PTSD from [place] war. Klonopin BID to TID for many years and stable  HTN- not well controlled on meds        Current Outpatient Medications   Medication Sig Dispense Refill    insulin glargine (BASAGLAR KWIKPEN) 100 UNIT/ML injection pen Inject 10 Units into the skin nightly Increase by 2 units every 2 nights until morning sugar is 120 then maintain that dose 5 pen 0    clonazePAM (KLONOPIN) 1 MG tablet TAKE ONE TABLET BY MOUTH THREE TIMES A DAY AS NEEDED FOR ANXIETY 90 tablet 0    blood glucose monitor strips Test three times a day & as needed for symptoms of irregular blood glucose. Dispense sufficient amount for indicated testing frequency plus additional to accommodate PRN testing needs.  100 strip 5    Insulin Pen Needle ([employer] PEN NEEDLES) 31G X 6 MM MISC 1 each by Does not apply route daily 100 each 3    insulin aspart (NOVOLOG FLEXPEN) 100 UNIT/ML injection pen Inject 4 Units into the skin 3 times daily (before meals) 5 pen 3    Continuous Blood Gluc Sensor (FREESTYLE JIMBO 2 SENSOR) MISC 1 Device by Does not apply route every 14 days 6 each 5    cloNIDine (CATAPRES) 0.3 MG tablet Take 1 tablet by mouth twice daily 60 tablet 0    triamterene-hydroCHLOROthiazide (MAXZIDE-25) 37.5-25 MG per tablet Take 1 tablet by mouth once daily 30 tablet 0    meloxicam (MOBIC) 15 MG tablet TAKE ONE TABLET BY MOUTH DAILY AS NEEDED FOR PAIN 10 tablet 0    diclofenac sodium (VOLTAREN) 1 % GEL Apply 4 g topically 4 times daily 150 g 1    prazosin (MINIPRESS) 5 MG capsule Take 1 capsule by mouth nightly 30 capsule 3    lisinopril (PRINIVIL;ZESTRIL) 40 MG tablet TAKE ONE TABLET BY MOUTH DAILY 30 tablet 4    metFORMIN (GLUCOPHAGE-XR) 500 MG extended release tablet Take 4 tablets by mouth daily (with breakfast) 120 tablet 5    tamsulosin (FLOMAX) 0.4 MG capsule TAKE ONE CAPSULE BY MOUTH ONCE NIGHTLY 30 capsule 5    simvastatin (ZOCOR) 20 MG tablet TAKE ONE TABLET BY MOUTH EVERY EVENING 30 tablet 5    ARIPiprazole (ABILIFY) 10 MG tablet TAKE ONE TABLET BY MOUTH DAILY 30 tablet 5    glipiZIDE (GLUCOTROL XL) 10 MG extended release tablet Take 1 tablet by mouth daily 30 tablet 5    amLODIPine (NORVASC) 5 MG tablet TAKE ONE TABLET BY MOUTH DAILY 30 tablet 5    nystatin (MYCOSTATIN) 026906 UNIT/GM powder Apply 3 times daily to the groin 2 Bottle 2    COCONUT OIL PO Take by mouth      Cinnamon 500 MG CAPS Take by mouth      [employer] OMEGA-3 KRILL OIL PO Take by mouth      aspirin EC 81 MG EC tablet Take 81 mg by mouth daily. No current facility-administered medications for this visit. Review of Systems   Constitutional: Positive for fatigue. HENT: Negative. Eyes: Negative. Respiratory: Positive for shortness of breath. Cardiovascular: Positive for leg swelling. Gastrointestinal: Negative. Endocrine: Negative. Genitourinary: Negative. Musculoskeletal: Positive for back pain and myalgias. Neurological: Negative. Psychiatric/Behavioral: The patient is nervous/anxious. All other systems reviewed and are negative. Vitals:    03/17/22 1122   BP: 139/80   Site: Left Upper Arm   Position: Sitting   Cuff Size: Medium Adult   Pulse: 77   Temp: 97.1 °F (36.2 °C)   SpO2: 97%   Weight: (!) 381 lb (172.8 kg)       Physical Exam  Constitutional:       Appearance: He is well-developed. HENT:      Head: Normocephalic. Eyes:      Pupils: Pupils are equal, round, and reactive to light. Cardiovascular:      Rate and Rhythm: Normal rate and regular rhythm. Pulmonary:      Effort: Pulmonary effort is normal.   Musculoskeletal:         General: Normal range of motion. Cervical back: Normal range of motion. Skin:     General: Skin is warm and dry. Neurological:      Mental Status: He is alert and oriented to person, place, and time.            On this date 3/17/2022 I have spent 65 minutes reviewing previous notes, test results and face to face with the patient discussing the diagnosis and importance of compliance with the treatment plan as well as documenting on the day of the visit. An electronic signature was used to authenticate this note.     --VAMSI Garcia - CNP

## 2022-03-17 NOTE — ASSESSMENT & PLAN NOTE
A1C 12.4  Cont metformin  Cont Glucotrol  Freestyle Brice placed and instructed, johnie uploaded to phone  Start Rola Kolb Berta sample provided) 10U at [facility] and increase as directed  Start Novolog 4u with SSI at each meal. Pt has SSI instructions. Will follow up in 2 weeks with sugars.

## 2022-03-18 ENCOUNTER — TELEPHONE (OUTPATIENT)
Dept: FAMILY MEDICINE CLINIC | Age: 52
End: 2022-03-18

## 2022-03-18 NOTE — TELEPHONE ENCOUNTER
Pt's wife called to state that insurance won't cover the Carlton Lomeli [facility] 2 Sensor, and they are too expensive to afford. Are there alternative medications?     LOV 03/17/2022

## 2022-03-18 NOTE — TELEPHONE ENCOUNTER
Pharmacy called and needs clarification on rx below:    Test strips    Dosage is 3 times a day as needed. How many times a day does the pt need to test.     Please call pharmacy with information.

## 2022-03-19 DIAGNOSIS — M54.32 LEFT SIDED SCIATICA: ICD-10-CM

## 2022-03-21 ENCOUNTER — TELEPHONE (OUTPATIENT)
Dept: FAMILY MEDICINE CLINIC | Age: 52
End: 2022-03-21

## 2022-03-21 RX ORDER — MELOXICAM 15 MG/1
TABLET ORAL
Qty: 10 TABLET | Refills: 0 | OUTPATIENT
Start: 2022-03-21

## 2022-03-21 NOTE — TELEPHONE ENCOUNTER
He is on medicaid- something should be covered. She will need to ask the pharmacy what alternatives are covered or ask the pharmacy to initiate a PA for this.

## 2022-03-21 NOTE — TELEPHONE ENCOUNTER
I can set him up with sample monitors for now until we can get his insurance to clear. I gave him 2 so he's set for 30 days and I needed to see him back then anyway. Was he able to  any meds?

## 2022-03-21 NOTE — TELEPHONE ENCOUNTER
I called and talked to the pharmacist - there was a total of 6 Rxs. The Freestyle keyana sensors are $224. And the Novolog flexpen is $311. They have a high deductable.

## 2022-03-21 NOTE — TELEPHONE ENCOUNTER
Pt's mother called and stated that new rx that was given to pt is close to $700.00. (She doesn't know the name of it.)    Mother said they can't afford this, is there anything else the pt can take? Please let her or the pt know what they can do, if anything.     LOV: 3/17/22

## 2022-03-21 NOTE — TELEPHONE ENCOUNTER
Wife will come  samples tomorrow and she found a coupon to get the Novolog for $75 and is looking for another one for Basaglar.

## 2022-03-22 ENCOUNTER — TELEPHONE (OUTPATIENT)
Dept: FAMILY MEDICINE CLINIC | Age: 52
End: 2022-03-22

## 2022-03-22 RX ORDER — PEN NEEDLE, DIABETIC 31 G X1/4"
1 NEEDLE, DISPOSABLE MISCELLANEOUS DAILY
Qty: 100 EACH | Refills: 3 | Status: SHIPPED | OUTPATIENT
Start: 2022-03-22

## 2022-03-22 NOTE — TELEPHONE ENCOUNTER
Pt has the insulin pen but he needs the pen needles    Grecia Ingram, [address] [place] [address] Av   Phone:  [phone]  Fax:  [phone]

## 2022-03-30 ENCOUNTER — OFFICE VISIT (OUTPATIENT)
Dept: FAMILY MEDICINE CLINIC | Age: 52
End: 2022-03-30
Payer: COMMERCIAL

## 2022-03-30 ENCOUNTER — HOSPITAL ENCOUNTER (OUTPATIENT)
Dept: VASCULAR LAB | Age: 52
Discharge: HOME | End: 2022-03-30
Payer: COMMERCIAL

## 2022-03-30 VITALS
RESPIRATION RATE: 12 BRPM | TEMPERATURE: 97.7 F | SYSTOLIC BLOOD PRESSURE: 150 MMHG | OXYGEN SATURATION: 97 % | WEIGHT: 315 LBS | HEART RATE: 79 BPM | HEIGHT: 70 IN | BODY MASS INDEX: 45.1 KG/M2 | DIASTOLIC BLOOD PRESSURE: 90 MMHG

## 2022-03-30 DIAGNOSIS — I20.8 EXERTIONAL ANGINA (HCC): ICD-10-CM

## 2022-03-30 DIAGNOSIS — L03.115 CELLULITIS OF BOTH LOWER EXTREMITIES: ICD-10-CM

## 2022-03-30 DIAGNOSIS — I10 UNCONTROLLED HYPERTENSION: ICD-10-CM

## 2022-03-30 DIAGNOSIS — L03.116 CELLULITIS OF BOTH LOWER EXTREMITIES: ICD-10-CM

## 2022-03-30 DIAGNOSIS — E11.65 UNCONTROLLED TYPE 2 DIABETES MELLITUS WITH HYPERGLYCEMIA (HCC): ICD-10-CM

## 2022-03-30 DIAGNOSIS — R60.0 LEG EDEMA: ICD-10-CM

## 2022-03-30 DIAGNOSIS — R60.0 LEG EDEMA: Primary | ICD-10-CM

## 2022-03-30 PROBLEM — M54.32 SCIATICA OF LEFT SIDE: Status: ACTIVE | Noted: 2022-03-18

## 2022-03-30 PROBLEM — M19.91 PRIMARY OSTEOARTHRITIS: Status: ACTIVE | Noted: 2022-03-18

## 2022-03-30 LAB
BASOPHILS ABSOLUTE: 0.1 K/UL (ref 0–0.2)
BASOPHILS RELATIVE PERCENT: 0.8 %
EKG ATRIAL RATE: 67 BPM
EKG DIAGNOSIS: NORMAL
EKG P AXIS: 18 DEGREES
EKG P-R INTERVAL: 142 MS
EKG Q-T INTERVAL: 412 MS
EKG QRS DURATION: 98 MS
EKG QTC CALCULATION (BAZETT): 435 MS
EKG R AXIS: 92 DEGREES
EKG T AXIS: 39 DEGREES
EKG VENTRICULAR RATE: 67 BPM
EOSINOPHILS ABSOLUTE: 0.3 K/UL (ref 0–0.6)
EOSINOPHILS RELATIVE PERCENT: 3.4 %
HCT VFR BLD CALC: 39.3 % (ref 40.5–52.5)
HEMOGLOBIN: 13.2 G/DL (ref 13.5–17.5)
LYMPHOCYTES ABSOLUTE: 1.7 K/UL (ref 1–5.1)
LYMPHOCYTES RELATIVE PERCENT: 20.4 %
MCH RBC QN AUTO: 28.8 PG (ref 26–34)
MCHC RBC AUTO-ENTMCNC: 33.5 G/DL (ref 31–36)
MCV RBC AUTO: 86 FL (ref 80–100)
MONOCYTES ABSOLUTE: 0.8 K/UL (ref 0–1.3)
MONOCYTES RELATIVE PERCENT: 9 %
NEUTROPHILS ABSOLUTE: 5.7 K/UL (ref 1.7–7.7)
NEUTROPHILS RELATIVE PERCENT: 66.4 %
PDW BLD-RTO: 13.5 % (ref 12.4–15.4)
PLATELET # BLD: 199 K/UL (ref 135–450)
PMV BLD AUTO: 9.3 FL (ref 5–10.5)
RBC # BLD: 4.58 M/UL (ref 4.2–5.9)
WBC # BLD: 8.6 K/UL (ref 4–11)

## 2022-03-30 PROCEDURE — 3046F HEMOGLOBIN A1C LEVEL >9.0%: CPT | Performed by: FAMILY MEDICINE

## 2022-03-30 PROCEDURE — 99214 OFFICE O/P EST MOD 30 MIN: CPT | Performed by: FAMILY MEDICINE

## 2022-03-30 PROCEDURE — 93970 EXTREMITY STUDY: CPT

## 2022-03-30 PROCEDURE — 93005 ELECTROCARDIOGRAM TRACING: CPT

## 2022-03-30 PROCEDURE — 93010 ELECTROCARDIOGRAM REPORT: CPT | Performed by: INTERNAL MEDICINE

## 2022-03-30 RX ORDER — CEPHALEXIN 500 MG/1
500 CAPSULE ORAL 3 TIMES DAILY
Qty: 21 CAPSULE | Refills: 0 | Status: SHIPPED | OUTPATIENT
Start: 2022-03-30 | End: 2022-04-06

## 2022-03-30 RX ORDER — FUROSEMIDE 40 MG/1
40 TABLET ORAL DAILY
Qty: 7 TABLET | Refills: 0 | Status: SHIPPED
Start: 2022-03-30 | End: 2022-04-08 | Stop reason: ALTCHOICE

## 2022-03-30 RX ORDER — INSULIN GLARGINE 100 [IU]/ML
30 INJECTION, SOLUTION SUBCUTANEOUS NIGHTLY
Qty: 5 PEN | Refills: 0 | Status: SHIPPED
Start: 2022-03-30 | End: 2022-04-06

## 2022-03-30 NOTE — PROGRESS NOTES
Portions of this chart may have been created with voice recognition software. Occasional wrong-word or \"sound-alike\" substitutions may have occurred due to the inherent limitations of voice recognition software. Read the chart carefully and recognize, using context, where these substitutions have occurred        Chief Complaint     Diabetes (feet are swollen, red, and hurt to touch, x 1 week, numbness in left leg)               SUBJECTIVE    Usama Darden is a 47 y.o. male here for establishing care with me and for the following concerns        1. Leg edema/2. Cellulitis of both lower extremities  Patient complains of bilateral lower extremity edema which started worsening for the past 2 to 3 weeks and developing redness and pain and a rash that is spreading up his legs. Patient states that he has been having pain that is also continuing to get worse. He has a history of sciatica of his right lower extremity now his left lower extremity has also started hurting more. Patient is unable to stand or do his regular day-to-day activities because of the pain. No fever chills no excessive fatigue. Patient is mostly sedentary not very active owing to his morbid obesity. At risk for venous thrombosis. We will evaluate further at this time. - VL DUP LOWER EXTREMITY VENOUS BILATERAL; Future    3. Exertional angina (HCC)  Also patient when asked about chest pain he states that that this morning when he went to take the trash out he had a chest pain which lasts for a few seconds associated with some shortness of breath. He states he has on and off exertional chest pain that has been ongoing for several months. Right now he does not complain of any chest pain no radiating pain no nausea no lightheadedness dizziness. He will need a stress test in the future however with today we will evaluate with an EKG and possibly cardiology referral.  - EKG 12 Lead; Future    4.  Uncontrolled type 2 diabetes mellitus with hyperglycemia ([address]  Patient blood sugars are still uncontrolled. Last week when he met with our colleague in the office he was started on Tresiba and dosing titrated currently he is up to 20 units however his insurance will not pay for [employer] anymore and that he has [address] which has been sent as a prescription. Recommended discontinuing Soco Lean and starting on Basaglar 30 units nightly along with insulin sliding scale as recommended. Recommended to check blood sugars frequently has 4 times a day and a follow-up back in 1 week for reevaluation. - insulin glargine (BASAGLAR KWIKPEN) 100 UNIT/ML injection pen; Inject 30 Units into the skin nightly  Dispense: 5 pen; Refill: 0  - CBC with Auto Differential; Future  - TSH; Future  - Comprehensive Metabolic Panel; Future    5. Uncontrolled Hypertension   Recently patient's blood pressures have also not been now well controlled. Patient is on clonidine and triamterene hydrochlorothiazide. Currently will not make any adjustments to his blood pressure medications because of her Lasix being added for his edema. We will reevaluate again in 1 week. Do cardiology work-up further as well and will monitor his symptoms.         REVIEW OF SYSTEMS:  Pertinent symptoms noted in HPI      Lab Results   Component Value Date    WBC 8.2 11/11/2016    HGB 12.7 (L) 11/11/2016    HCT 39.8 (L) 11/11/2016    MCV 85.5 11/11/2016     11/11/2016      Lab Results   Component Value Date    LABA1C 12.8 03/17/2022     Lab Results   Component Value Date    .8 11/30/2021     Lab Results   Component Value Date    TSH 2.19 05/16/2014     Lab Results   Component Value Date    CHOL 152 11/30/2021     Lab Results   Component Value Date    TRIG 84 11/30/2021     Lab Results   Component Value Date    HDL 58 11/30/2021     Lab Results   Component Value Date    LDLCALC 77 11/30/2021     Lab Results   Component Value Date    LABVLDL 17 11/30/2021     No results found for: CHOLHDLRATIO Lab Results   Component Value Date     11/30/2021    K 4.2 11/30/2021    CL 97 (L) 11/30/2021    CO2 25 11/30/2021    BUN 19 11/30/2021    CREATININE 0.6 (L) 11/30/2021    GLUCOSE 299 (H) 11/30/2021    CALCIUM 9.2 11/30/2021    PROT 6.9 11/30/2021    LABALBU 4.2 11/30/2021    BILITOT 0.3 11/30/2021    ALKPHOS 70 11/30/2021    AST 59 (H) 11/30/2021     (H) 11/30/2021    LABGLOM >60 11/30/2021    GFRAA >60 11/30/2021    AGRATIO 1.6 11/30/2021    GLOB 2.9 05/05/2021           Current Outpatient Medications   Medication Sig Dispense Refill    cephALEXin (KEFLEX) 500 MG capsule Take 1 capsule by mouth 3 times daily for 7 days 21 capsule 0    furosemide (LASIX) 40 MG tablet Take 1 tablet by mouth daily 7 tablet 0    insulin glargine (BASAGLAR KWIKPEN) 100 UNIT/ML injection pen Inject 30 Units into the skin nightly 5 pen 0    Insulin Pen Needle ([employer] PEN NEEDLES) 31G X 6 MM MISC 1 each by Does not apply route daily 100 each 3    clonazePAM (KLONOPIN) 1 MG tablet TAKE ONE TABLET BY MOUTH THREE TIMES A DAY AS NEEDED FOR ANXIETY 90 tablet 0    blood glucose monitor strips Test three times a day & as needed for symptoms of irregular blood glucose. Dispense sufficient amount for indicated testing frequency plus additional to accommodate PRN testing needs.  100 strip 5    Insulin Pen Needle ([employer] PEN NEEDLES) 31G X 6 MM MISC 1 each by Does not apply route daily 100 each 3    insulin aspart (NOVOLOG FLEXPEN) 100 UNIT/ML injection pen Inject 4 Units into the skin 3 times daily (before meals) 5 pen 3    Continuous Blood Gluc Sensor (FREESTYLE JIMBO 2 SENSOR) MISC 1 Device by Does not apply route every 14 days 6 each 5    cloNIDine (CATAPRES) 0.3 MG tablet Take 1 tablet by mouth twice daily 60 tablet 0    triamterene-hydroCHLOROthiazide (MAXZIDE-25) 37.5-25 MG per tablet Take 1 tablet by mouth once daily 30 tablet 0    meloxicam (MOBIC) 15 MG tablet TAKE ONE TABLET BY MOUTH DAILY AS NEEDED FOR PAIN 10 tablet 0    diclofenac sodium (VOLTAREN) 1 % GEL Apply 4 g topically 4 times daily 150 g 1    prazosin (MINIPRESS) 5 MG capsule Take 1 capsule by mouth nightly 30 capsule 3    lisinopril (PRINIVIL;ZESTRIL) 40 MG tablet TAKE ONE TABLET BY MOUTH DAILY 30 tablet 4    metFORMIN (GLUCOPHAGE-XR) 500 MG extended release tablet Take 4 tablets by mouth daily (with breakfast) 120 tablet 5    tamsulosin (FLOMAX) 0.4 MG capsule TAKE ONE CAPSULE BY MOUTH ONCE NIGHTLY 30 capsule 5    simvastatin (ZOCOR) 20 MG tablet TAKE ONE TABLET BY MOUTH EVERY EVENING 30 tablet 5    ARIPiprazole (ABILIFY) 10 MG tablet TAKE ONE TABLET BY MOUTH DAILY 30 tablet 5    glipiZIDE (GLUCOTROL XL) 10 MG extended release tablet Take 1 tablet by mouth daily 30 tablet 5    amLODIPine (NORVASC) 5 MG tablet TAKE ONE TABLET BY MOUTH DAILY 30 tablet 5    nystatin (MYCOSTATIN) 937025 UNIT/GM powder Apply 3 times daily to the groin 2 Bottle 2    COCONUT OIL PO Take by mouth      Cinnamon 500 MG CAPS Take by mouth      [employer] OMEGA-3 KRILL OIL PO Take by mouth      aspirin EC 81 MG EC tablet Take 81 mg by mouth daily. No current facility-administered medications for this visit.        No Known Allergies      Past Medical History:   Diagnosis Date    Abdominal wall hernia 2/21/2012    Anemia 4/15/2013    Claustrophobia 4/15/2013    Depression 3/8/2011    [facility] 2/20/2012    Family history of premature coronary artery disease 6/19/2012    Hyperlipidemia 2/27/2012    Hypertension 3/8/2011    Insomnia 2/17/2012    Left atrial dilation 6/19/2012    Left ventricular hypertrophy 6/19/2012    Neuropathy     Obesity 3/8/2011    Obstructive sleep apnea 2/17/2012    uses Cpap sometimes    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    Post traumatic stress disorder 4/15/2013    Right atrial dilation 6/19/2012    Right bundle branch block 5/15/2014    Right ventricular dilation 6/19/2012         Past Surgical History:   Procedure Laterality Date    CARDIOVASCULAR STRESS TEST  May 28 2014    Non-Imaging Stress Test    CIRCUMCISION  2017    Due to phimosis: Dr. Tate Mis Right 2016    ankle subtower fusion with bone graft using c-arm         Family History   Problem Relation Age of Onset    Heart Disease Mother     Coronary Art Dis Mother     Heart Attack Mother     Mental Illness Mother         Social History     Socioeconomic History    Marital status:      Spouse name: Heriberto Alba Number of children: 0    Years of education: None    Highest education level: None   Occupational History    Occupation:    Tobacco Use    Smoking status: Former Smoker     Packs/day: 0.25     Years: 5.00     Pack years: 1.25     Quit date: 2008     Years since quittin.2    Smokeless tobacco: Never Used    Tobacco comment: quit smoking about    Substance and Sexual Activity    Alcohol use: No    Drug use: No    Sexual activity: Yes     Partners: Female     Comment:  - [address] [place]   Other Topics Concern    None   Social History Narrative    None     Social Determinants of Health     Financial Resource Strain: Low Risk     Difficulty of Paying Living Expenses: Not very hard   Food Insecurity: No Food Insecurity    Worried About Running Out of Food in the Last Year: Never true    Sylvia of Food in the Last Year: Never true   Transportation Needs: No Transportation Needs    Lack of Transportation (Medical): No    Lack of Transportation (Non-Medical):  No   Physical Activity:     Days of Exercise per Week: Not on file    Minutes of Exercise per Session: Not on file   Stress:     Feeling of Stress : Not on file   Social Connections:     Frequency of Communication with Friends and Family: Not on file    Frequency of Social Gatherings with Friends and Family: Not on file    Attends Hindu Services: Not on angina ([facility]  -     Cancel: EKG 12 lead; Future  -     EKG 12 Lead; Future    Uncontrolled type 2 diabetes mellitus with hyperglycemia ([facility]    Uncontrolled hypertension    Other orders  -     cephALEXin (KEFLEX) 500 MG capsule; Take 1 capsule by mouth 3 times daily for 7 days  -     furosemide (LASIX) 40 MG tablet; Take 1 tablet by mouth daily  -     insulin glargine (BASAGLAR KWIKPEN) 100 UNIT/ML injection pen; Inject 30 Units into the skin nightly  -     CBC with Auto Differential; Future  -     TSH; Future  -     Comprehensive Metabolic Panel; Future           DISCHARGE MEDICATION LIST        Medication List          Accurate as of March 30, 2022  1:56 PM. If you have any questions, ask your nurse or doctor. START taking these medications    cephALEXin 500 MG capsule  Commonly known as: KEFLEX  Take 1 capsule by mouth 3 times daily for 7 days  Started by: Ariela Fernandez MD     furosemide 40 MG tablet  Commonly known as: Lasix  Take 1 tablet by mouth daily  Started by: Ariela Fernandez MD        CHANGE how you take these medications    Basaglar KwikPen 100 UNIT/ML injection pen  Generic drug: insulin glargine  Inject 30 Units into the skin nightly  What changed:   · how much to take  · additional instructions  Changed by: Ariela Fernandez MD        CONTINUE taking these medications    amLODIPine 5 MG tablet  Commonly known as: NORVASC  TAKE ONE TABLET BY MOUTH DAILY     ARIPiprazole 10 MG tablet  Commonly known as: ABILIFY  TAKE ONE TABLET BY MOUTH DAILY     aspirin EC 81 MG EC tablet     blood glucose test strips  Test three times a day & as needed for symptoms of irregular blood glucose. Dispense sufficient amount for indicated testing frequency plus additional to accommodate PRN testing needs.      Cinnamon 500 MG Caps     clonazePAM 1 MG tablet  Commonly known as: KLONOPIN  TAKE ONE TABLET BY MOUTH THREE TIMES A DAY AS NEEDED FOR ANXIETY     cloNIDine 0.3 MG tablet  Commonly known as: CATAPRES  Take 1 tablet by mouth twice daily     COCONUT OIL PO     CVS OMEGA-3 KRILL OIL PO     diclofenac sodium 1 % Gel  Commonly known as: VOLTAREN  Apply 4 g topically 4 times daily     FreeStyle Brice 2 Sensor Misc  1 Device by Does not apply route every 14 days     glipiZIDE 10 MG extended release tablet  Commonly known as: GLUCOTROL XL  Take 1 tablet by mouth daily     * [employer] Pen Needles 31G X 6 MM Misc  Generic drug: Insulin Pen Needle  1 each by Does not apply route daily     * [employer] Pen Needles 31G X 6 MM Misc  Generic drug: Insulin Pen Needle  1 each by Does not apply route daily     lisinopril 40 MG tablet  Commonly known as: PRINIVIL;ZESTRIL  TAKE ONE TABLET BY MOUTH DAILY     meloxicam 15 MG tablet  Commonly known as: MOBIC  TAKE ONE TABLET BY MOUTH DAILY AS NEEDED FOR PAIN     metFORMIN 500 MG extended release tablet  Commonly known as: GLUCOPHAGE-XR  Take 4 tablets by mouth daily (with breakfast)     NovoLOG FlexPen 100 UNIT/ML injection pen  Generic drug: insulin aspart  Inject 4 Units into the skin 3 times daily (before meals)     nystatin 391887 UNIT/GM powder  Commonly known as: MYCOSTATIN  Apply 3 times daily to the groin     prazosin 5 MG capsule  Commonly known as: MINIPRESS  Take 1 capsule by mouth nightly     simvastatin 20 MG tablet  Commonly known as: ZOCOR  TAKE ONE TABLET BY MOUTH EVERY EVENING     tamsulosin 0.4 MG capsule  Commonly known as: FLOMAX  TAKE ONE CAPSULE BY MOUTH ONCE NIGHTLY     triamterene-hydroCHLOROthiazide 37.5-25 MG per tablet  Commonly known as: MAXZIDE-25  Take 1 tablet by mouth once daily         * This list has 2 medication(s) that are the same as other medications prescribed for you. Read the directions carefully, and ask your doctor or other care provider to review them with you.                Where to Get Your Medications      These medications were sent to [facility], [address] [place] [address] Phone: [phone]   · cephALEXin 500 MG capsule  · furosemide 40 MG tablet     Information about where to get these medications is not yet available    Ask your nurse or doctor about these medications  · Basaglar KwikPen 100 UNIT/ML injection pen          Return in about 1 week (around 4/6/2022). Please refer to diagnosis, orders and patient instructions for further recommendations given. Body mass index is 56.95 kg/m². . Goals of Healthy standard of living discussed. Emphasis given on appropriate diet and routine regular physical activity with cardio and strength training as much as tolerated advised. All patient's questions and concerns were addressed appropriately. All orders, handouts were reviewed in detail with the patient and patient voiced understanding verbally.

## 2022-03-31 ENCOUNTER — TELEPHONE (OUTPATIENT)
Dept: FAMILY MEDICINE CLINIC | Age: 52
End: 2022-03-31

## 2022-03-31 DIAGNOSIS — D64.9 ANEMIA OF UNKNOWN ETIOLOGY: Primary | ICD-10-CM

## 2022-03-31 DIAGNOSIS — Z12.11 COLON CANCER SCREENING: ICD-10-CM

## 2022-03-31 DIAGNOSIS — I20.8 EXERTIONAL ANGINA (HCC): Primary | ICD-10-CM

## 2022-03-31 LAB
A/G RATIO: 1.2 (ref 1.1–2.2)
ALBUMIN SERPL-MCNC: 4 G/DL (ref 3.4–5)
ALP BLD-CCNC: 74 U/L (ref 40–129)
ALT SERPL-CCNC: 59 U/L (ref 10–40)
ANION GAP SERPL CALCULATED.3IONS-SCNC: 16 MMOL/L (ref 3–16)
AST SERPL-CCNC: 42 U/L (ref 15–37)
BILIRUB SERPL-MCNC: 0.4 MG/DL (ref 0–1)
BUN BLDV-MCNC: 18 MG/DL (ref 7–20)
CALCIUM SERPL-MCNC: 9.8 MG/DL (ref 8.3–10.6)
CHLORIDE BLD-SCNC: 100 MMOL/L (ref 99–110)
CO2: 24 MMOL/L (ref 21–32)
CREAT SERPL-MCNC: 0.7 MG/DL (ref 0.9–1.3)
GFR AFRICAN AMERICAN: >60
GFR NON-AFRICAN AMERICAN: >60
GLUCOSE BLD-MCNC: 238 MG/DL (ref 70–99)
POTASSIUM SERPL-SCNC: 4.4 MMOL/L (ref 3.5–5.1)
SODIUM BLD-SCNC: 140 MMOL/L (ref 136–145)
TOTAL PROTEIN: 7.4 G/DL (ref 6.4–8.2)
TSH SERPL DL<=0.05 MIU/L-ACNC: 2.68 UIU/ML (ref 0.27–4.2)

## 2022-04-05 PROBLEM — R79.89 ELEVATED LIVER FUNCTION TESTS: Status: ACTIVE | Noted: 2022-04-05

## 2022-04-06 ENCOUNTER — OFFICE VISIT (OUTPATIENT)
Dept: ENDOCRINOLOGY | Age: 52
End: 2022-04-06
Payer: COMMERCIAL

## 2022-04-06 VITALS
WEIGHT: 315 LBS | BODY MASS INDEX: 45.1 KG/M2 | HEART RATE: 102 BPM | SYSTOLIC BLOOD PRESSURE: 152 MMHG | RESPIRATION RATE: 14 BRPM | HEIGHT: 70 IN | TEMPERATURE: 98 F | DIASTOLIC BLOOD PRESSURE: 84 MMHG | OXYGEN SATURATION: 96 %

## 2022-04-06 DIAGNOSIS — R79.89 ELEVATED LIVER FUNCTION TESTS: ICD-10-CM

## 2022-04-06 DIAGNOSIS — Z82.49 FAMILY HISTORY OF PREMATURE CORONARY ARTERY DISEASE: Chronic | ICD-10-CM

## 2022-04-06 DIAGNOSIS — E66.01 CLASS 3 SEVERE OBESITY WITH SERIOUS COMORBIDITY AND BODY MASS INDEX (BMI) OF 50.0 TO 59.9 IN ADULT, UNSPECIFIED OBESITY TYPE (HCC): ICD-10-CM

## 2022-04-06 DIAGNOSIS — I10 PRIMARY HYPERTENSION: Chronic | ICD-10-CM

## 2022-04-06 DIAGNOSIS — E78.2 MIXED HYPERLIPIDEMIA: Chronic | ICD-10-CM

## 2022-04-06 PROCEDURE — 3046F HEMOGLOBIN A1C LEVEL >9.0%: CPT | Performed by: INTERNAL MEDICINE

## 2022-04-06 PROCEDURE — 99205 OFFICE O/P NEW HI 60 MIN: CPT | Performed by: INTERNAL MEDICINE

## 2022-04-06 RX ORDER — METFORMIN HYDROCHLORIDE 500 MG/1
1000 TABLET, EXTENDED RELEASE ORAL 2 TIMES DAILY
Qty: 120 TABLET | Refills: 3 | Status: SHIPPED | OUTPATIENT
Start: 2022-04-06 | End: 2022-07-11

## 2022-04-06 RX ORDER — INSULIN GLARGINE 100 [IU]/ML
40 INJECTION, SOLUTION SUBCUTANEOUS NIGHTLY
Qty: 10 PEN | Refills: 3 | Status: SHIPPED | OUTPATIENT
Start: 2022-04-06 | End: 2022-04-11

## 2022-04-06 RX ORDER — GLIPIZIDE 10 MG/1
10 TABLET, FILM COATED, EXTENDED RELEASE ORAL DAILY
Qty: 30 TABLET | Refills: 5 | Status: SHIPPED | OUTPATIENT
Start: 2022-04-06 | End: 2022-04-06 | Stop reason: ALTCHOICE

## 2022-04-06 RX ORDER — INSULIN ASPART 100 [IU]/ML
5 INJECTION, SOLUTION INTRAVENOUS; SUBCUTANEOUS
Qty: 10 PEN | Refills: 3 | Status: SHIPPED | OUTPATIENT
Start: 2022-04-06 | End: 2022-04-11

## 2022-04-06 NOTE — PROGRESS NOTES
6/19/2012    Neuropathy     Obesity 3/8/2011    Obstructive sleep apnea 2/17/2012    uses Cpap sometimes    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    Post traumatic stress disorder 4/15/2013    Right atrial dilation 6/19/2012    Right bundle branch block 5/15/2014    Right ventricular dilation 6/19/2012      Patient Active Problem List   Diagnosis    Hypertension    Moderate episode of recurrent major depressive disorder (HCC)    Right leg pain    Eczema    Right lumbar radiculopathy    Urinary urgency    Obstructive sleep apnea    Memory loss    Abdominal mass    Diabetes mellitus, type 2 (HCC)    Abdominal wall hernia    Hyperlipidemia    Chest pain    Left ventricular hypertrophy    Left atrial dilation    Right atrial dilation    Right ventricular dilation    Syncope    Family history of premature coronary artery disease    Post traumatic stress disorder    Claustrophobia    Anemia    Right foot pain    Left foot pain    Right bundle branch block    Right ankle pain    BMI 50.0-59.9, adult ([facility])    Type 2 diabetes mellitus with hemoglobin A1c goal of 7.0%-8.0% ([facility])    Ankle arthritis    Congenital tarsal coalition    Osteoarthritis of subtalar joint    Osteoarthritis of right subtalar joint    Tarsal coalitions    Ankle abrasion with infection    Wound infection after surgery    Type 2 diabetes mellitus without complication ([facility])    Class 3 severe obesity with body mass index (BMI) of 50.0 to 59.9 in adult ([facility] [address]    Insomnia due to mental disorder    RYAN (generalized anxiety disorder)    Primary osteoarthritis    Sciatica of left side    Type 2 diabetes mellitus, uncontrolled, with neuropathy ([facility])    Elevated liver function tests     Past Surgical History:   Procedure Laterality Date    CARDIOVASCULAR STRESS TEST  May 28 2014    Non-Imaging Stress Test    CIRCUMCISION  03/2017    Due to phimosis: Dr. Lianna Lopez ORTHOPEDIC SURGERY Right 2016    ankle subtower fusion with bone graft using c-arm     Social History     Socioeconomic History    Marital status:      Spouse name: Piper Precise Number of children: 0    Years of education: Not on file    Highest education level: Not on file   Occupational History    Occupation:    Tobacco Use    Smoking status: Former Smoker     Packs/day: 0.25     Years: 5.00     Pack years: 1.25     Types: Cigarettes     Quit date: 2008     Years since quittin.2    Smokeless tobacco: Never Used    Tobacco comment: quit smoking about    Substance and Sexual Activity    Alcohol use: No    Drug use: No    Sexual activity: Yes     Partners: Female     Comment:  - [address] [place]   Other Topics Concern    Not on file   Social History Narrative    Not on file     Social Determinants of Health     Financial Resource Strain: Low Risk     Difficulty of Paying Living Expenses: Not very hard   Food Insecurity: No Food Insecurity    Worried About Running Out of Food in the Last Year: Never true    Sylvia of Food in the Last Year: Never true   Transportation Needs: No Transportation Needs    Lack of Transportation (Medical): No    Lack of Transportation (Non-Medical):  No   Physical Activity:     Days of Exercise per Week: Not on file    Minutes of Exercise per Session: Not on file   Stress:     Feeling of Stress : Not on file   Social Connections:     Frequency of Communication with Friends and Family: Not on file    Frequency of Social Gatherings with Friends and Family: Not on file    Attends Christianity Services: Not on file    Active Member of Clubs or Organizations: Not on file    Attends Club or Organization Meetings: Not on file    Marital Status: Not on file   Intimate Partner Violence:     Fear of Current or Ex-Partner: Not on file    Emotionally Abused: Not on file    Physically Abused: Not on file    Sexually Abused: Not on file Housing Stability:     Unable to Pay for Housing in the Last Year: Not on file    Number of Places Lived in the Last Year: Not on file    Unstable Housing in the Last Year: Not on file     Family History   Problem Relation Age of Onset    Heart Disease Mother     Coronary Art Dis Mother     Heart Attack Mother     Mental Illness Mother      Current Outpatient Medications   Medication Sig Dispense Refill    insulin aspart (NOVOLOG FLEXPEN) 100 UNIT/ML injection pen Inject 5 Units into the skin 3 times daily (before meals) Plus 3 units for every 50 of BG above 150 qac 10 pen 3    insulin glargine (BASAGLAR KWIKPEN) 100 UNIT/ML injection pen Inject 40 Units into the skin nightly 10 pen 3    metFORMIN (GLUCOPHAGE-XR) 500 MG extended release tablet Take 2 tablets by mouth in the morning and at bedtime 120 tablet 3    cephALEXin (KEFLEX) 500 MG capsule Take 1 capsule by mouth 3 times daily for 7 days 21 capsule 0    furosemide (LASIX) 40 MG tablet Take 1 tablet by mouth daily 7 tablet 0    Insulin Pen Needle ([employer] PEN NEEDLES) 31G X 6 MM MISC 1 each by Does not apply route daily 100 each 3    clonazePAM (KLONOPIN) 1 MG tablet TAKE ONE TABLET BY MOUTH THREE TIMES A DAY AS NEEDED FOR ANXIETY 90 tablet 0    blood glucose monitor strips Test three times a day & as needed for symptoms of irregular blood glucose. Dispense sufficient amount for indicated testing frequency plus additional to accommodate PRN testing needs.  100 strip 5    Insulin Pen Needle ([employer] PEN NEEDLES) 31G X 6 MM MISC 1 each by Does not apply route daily 100 each 3    Continuous Blood Gluc Sensor (FREESTYLE JIMBO 2 SENSOR) MISC 1 Device by Does not apply route every 14 days 6 each 5    cloNIDine (CATAPRES) 0.3 MG tablet Take 1 tablet by mouth twice daily 60 tablet 0    triamterene-hydroCHLOROthiazide (MAXZIDE-25) 37.5-25 MG per tablet Take 1 tablet by mouth once daily 30 tablet 0    diclofenac sodium (VOLTAREN) 1 % GEL Apply 4 g topically 4 times daily 150 g 1    prazosin (MINIPRESS) 5 MG capsule Take 1 capsule by mouth nightly 30 capsule 3    lisinopril (PRINIVIL;ZESTRIL) 40 MG tablet TAKE ONE TABLET BY MOUTH DAILY 30 tablet 4    tamsulosin (FLOMAX) 0.4 MG capsule TAKE ONE CAPSULE BY MOUTH ONCE NIGHTLY 30 capsule 5    simvastatin (ZOCOR) 20 MG tablet TAKE ONE TABLET BY MOUTH EVERY EVENING 30 tablet 5    ARIPiprazole (ABILIFY) 10 MG tablet TAKE ONE TABLET BY MOUTH DAILY 30 tablet 5    amLODIPine (NORVASC) 5 MG tablet TAKE ONE TABLET BY MOUTH DAILY 30 tablet 5    nystatin (MYCOSTATIN) 918560 UNIT/GM powder Apply 3 times daily to the groin 2 Bottle 2    COCONUT OIL PO Take by mouth      Cinnamon 500 MG CAPS Take by mouth      [employer] OMEGA-3 KRILL OIL PO Take by mouth      aspirin EC 81 MG EC tablet Take 81 mg by mouth daily.  meloxicam (MOBIC) 15 MG tablet TAKE ONE TABLET BY MOUTH DAILY AS NEEDED FOR PAIN (Patient not taking: Reported on 2022) 10 tablet 0     No current facility-administered medications for this visit.      No Known Allergies  Family Status   Relation Name Status    Mother   at age 49    Father   at age 68       Lab Review:    Lab Results   Component Value Date    WBC 8.6 2022    HGB 13.2 2022    HCT 39.3 2022    MCV 86.0 2022     2022     Lab Results   Component Value Date     2022    K 4.4 2022     2022    CO2 24 2022    BUN 18 2022    CREATININE 0.7 2022    GLUCOSE 238 2022    CALCIUM 9.8 2022    PROT 7.4 2022    PROT 6.4 2016    LABALBU 4.0 2022    BILITOT 0.4 2022    ALKPHOS 74 2022    AST 42 2022    ALT 59 2022    LABGLOM >60 2022    GFRAA >60 2022    GFRAA >60 2013    AGRATIO 1.2 2022    GLOB 2.9 2021     Lab Results   Component Value Date    TSH 2.68 2022     Lab Results   Component Value Date    LABA1C 12.8 insomnia  Hematologic/Lymphatic: no tendency for easy bleeding, no swollen lymph nodes, has tendency for easy bruising  Immunology: no seasonal allergies, no frequent infections, no frequent illnesses  Endocrine: has temperature intolerance    BP (!) 152/84   Pulse 102   Temp 98 °F (36.7 °C)   Resp 14   Ht 5' 9.6\" (1.768 m)   Wt (!) 374 lb (169.6 kg)   SpO2 96%   BMI 54.28 kg/m²    Wt Readings from Last 3 Encounters:   04/06/22 (!) 374 lb (169.6 kg)   03/30/22 (!) 392 lb 6.4 oz (178 kg)   03/17/22 (!) 381 lb (172.8 kg)     Body mass index is 54.28 kg/m².       OBJECTIVE:  Constitutional: no acute distress, well appearing and well nourished  Psychiatric: oriented to person, place and time, judgement and insight and normal, recent and remote memory and intact and mood and affect are normal  Skin: skin and subcutaneous tissue is normal without mass, normal turgor  Head and Face: examination of head and face revealed no abnormalities  Eyes: no lid or conjunctival swelling, erythema or discharge, pupils are normal, equal, round, reactive to light  Ears/Nose: external inspection of ears and nose revealed no abnormalities, hearing is grossly normal  Oropharynx/Mouth/Face: lips, tongue and gums are normal with no lesions, the voice quality was normal  Neck: neck is supple and symmetric, with midline trachea and no masses, thyroid is normal  Lymphatics: normal cervical lymph nodes, normal supraclavicular nodes  Pulmonary: no increased work of breathing or signs of respiratory distress, lungs are clear to auscultation  Cardiovascular: normal heart rate and rhythm, normal S1 and S2, no murmurs and pedal pulses and 2+ bilaterally, 1+ edema  Abdomen: abdomen is soft, non-tender with no masses  Musculoskeletal: normal gait and station and exam of the digits and nails are normal  Neurological: normal coordination and normal general cortical function    Visual inspection:  Deformity/amputation: absent  Skin lesions/pre-ulcerative calluses: absent  Edema: right- 1+, left- 1+    Sensory exam:  Monofilament sensation: normal  (minimum of 5 random plantar locations tested, avoiding callused areas - > 1 area with absence of sensation is + for neuropathy)    Plus at least one of the following:  Pulses: normal  Proprioception: Intact  Vibration (128 Hz): Impaired    ASSESSMENT/PLAN:  1. Type 2 diabetes mellitus, uncontrolled, with neuropathy ([facility] [address]  Counseled patient about pathophysiology of diabetes, basal prandial insulin requirements, insulin adjustments, correction scale insulin. Referred for diabetes education, but patient refused because of financial issues. Asked patient to send me blood glucose records. Download CGM in 2 weeks. Made adjustments to NovoLog and Basaglar insulins. Discontinue glipizide, unlikely effective  - insulin aspart (NOVOLOG FLEXPEN) 100 UNIT/ML injection pen; Inject 5 Units into the skin 3 times daily (before meals) Plus 3 units for every 50 of BG above 150 qac  Dispense: 10 pen; Refill: 3  - insulin glargine (BASAGLAR KWIKPEN) 100 UNIT/ML injection pen; Inject 40 Units into the skin nightly  Dispense: 10 pen; Refill: 3  - metFORMIN (GLUCOPHAGE-XR) 500 MG extended release tablet; Take 2 tablets by mouth in the morning and at bedtime  Dispense: 120 tablet; Refill: 3  - [facility] DIABETES FOOT EXAM  - Hemoglobin A1C; Future  - C-Peptide; Future  - Comprehensive Metabolic Panel; Future  - Lipid Panel; Future  - Microalbumin / Creatinine Urine Ratio; Future    2. Mixed hyperlipidemia  Continue simvastatin 20 mg daily  - Lipid Panel; Future    3. Class 3 severe obesity with serious comorbidity and body mass index (BMI) of 50.0 to 59.9 in adult, unspecified obesity type (HCC)  Eat healthy, activity as tolerated    4. Primary hypertension  Continue current medications  - Comprehensive Metabolic Panel; Future    5. Elevated liver function tests  Follow closely  - Comprehensive Metabolic Panel; Future    6. Family history of premature coronary artery disease  Mother  from sudden cardiac death at 48  Patient has cardiology consultation scheduled    Reviewed and/or ordered clinical lab results Yes  Reviewed and/or ordered radiology tests Yes  Reviewed and/or ordered other diagnostic tests No  Discussed test results with performing physician No  Independently reviewed image, tracing, or specimen No  Made a decision to obtain old records No  Reviewed and summarized old records Yes   TSH 2.68  ALT 59  AST 42  Hemoglobin A1c 12.8  LDL 77  HDL 58  Triglycerides 84  Obtained history from other than patient No    Merry Navarro was counseled regarding symptoms of diabetes diagnosis, course and complications of disease if inadequately treated, side effects of medications, diagnosis, treatment options, and prognosis, risks, benefits, complications, and alternatives of treatment, labs, imaging and other studies and treatment targets and goals, diabetes pathophysiology, basal and prandial insulin requirements, medications available for diabetes treatment, insulin adjustments, correction scale of insulin. He understands instructions and counseling. These diagnosis were discussed and reviewed with the patient including the advantages of drug therapy. He was counseled at this visit on the following: diabetes complication prevention and foot care. Total time I spent for this encounter gathering history, performing physical exam, coordinating care, counseling, and documenting in the chart - 60 minutes    Return in about 3 months (around 2022) for diabetes.     Electronically signed by Emily Hayes MD on 2022 at 10:56 PM

## 2022-04-08 ENCOUNTER — OFFICE VISIT (OUTPATIENT)
Dept: FAMILY MEDICINE CLINIC | Age: 52
End: 2022-04-08
Payer: COMMERCIAL

## 2022-04-08 VITALS
DIASTOLIC BLOOD PRESSURE: 88 MMHG | BODY MASS INDEX: 46.65 KG/M2 | SYSTOLIC BLOOD PRESSURE: 138 MMHG | HEART RATE: 98 BPM | OXYGEN SATURATION: 97 % | WEIGHT: 315 LBS | TEMPERATURE: 97.7 F | HEIGHT: 69 IN

## 2022-04-08 DIAGNOSIS — I87.303 VENOUS HYPERTENSION OF BOTH LOWER EXTREMITIES: ICD-10-CM

## 2022-04-08 DIAGNOSIS — R60.0 LEG EDEMA: Primary | ICD-10-CM

## 2022-04-08 DIAGNOSIS — L03.115 CELLULITIS OF BOTH LOWER EXTREMITIES: ICD-10-CM

## 2022-04-08 DIAGNOSIS — L03.116 CELLULITIS OF BOTH LOWER EXTREMITIES: ICD-10-CM

## 2022-04-08 DIAGNOSIS — G57.12 MERALGIA PARAESTHETICA, LEFT: ICD-10-CM

## 2022-04-08 PROCEDURE — 99214 OFFICE O/P EST MOD 30 MIN: CPT | Performed by: FAMILY MEDICINE

## 2022-04-08 RX ORDER — GABAPENTIN 300 MG/1
300 CAPSULE ORAL NIGHTLY
Qty: 30 CAPSULE | Refills: 2 | Status: SHIPPED | OUTPATIENT
Start: 2022-04-08 | End: 2022-06-08 | Stop reason: SDUPTHER

## 2022-04-08 RX ORDER — FUROSEMIDE 40 MG/1
TABLET ORAL
Qty: 60 TABLET | Refills: 3 | Status: SHIPPED | OUTPATIENT
Start: 2022-04-08 | End: 2022-04-11 | Stop reason: SDUPTHER

## 2022-04-08 ASSESSMENT — PATIENT HEALTH QUESTIONNAIRE - PHQ9
3. TROUBLE FALLING OR STAYING ASLEEP: 3
10. IF YOU CHECKED OFF ANY PROBLEMS, HOW DIFFICULT HAVE THESE PROBLEMS MADE IT FOR YOU TO DO YOUR WORK, TAKE CARE OF THINGS AT HOME, OR GET ALONG WITH OTHER PEOPLE: 2
SUM OF ALL RESPONSES TO PHQ QUESTIONS 1-9: 12
SUM OF ALL RESPONSES TO PHQ QUESTIONS 1-9: 12
SUM OF ALL RESPONSES TO PHQ9 QUESTIONS 1 & 2: 3
SUM OF ALL RESPONSES TO PHQ QUESTIONS 1-9: 12
9. THOUGHTS THAT YOU WOULD BE BETTER OFF DEAD, OR OF HURTING YOURSELF: 0
7. TROUBLE CONCENTRATING ON THINGS, SUCH AS READING THE NEWSPAPER OR WATCHING TELEVISION: 1
1. LITTLE INTEREST OR PLEASURE IN DOING THINGS: 2
SUM OF ALL RESPONSES TO PHQ QUESTIONS 1-9: 12
5. POOR APPETITE OR OVEREATING: 0
6. FEELING BAD ABOUT YOURSELF - OR THAT YOU ARE A FAILURE OR HAVE LET YOURSELF OR YOUR FAMILY DOWN: 2
8. MOVING OR SPEAKING SO SLOWLY THAT OTHER PEOPLE COULD HAVE NOTICED. OR THE OPPOSITE, BEING SO FIGETY OR RESTLESS THAT YOU HAVE BEEN MOVING AROUND A LOT MORE THAN USUAL: 0
4. FEELING TIRED OR HAVING LITTLE ENERGY: 3
2. FEELING DOWN, DEPRESSED OR HOPELESS: 1

## 2022-04-08 NOTE — TELEPHONE ENCOUNTER
Pharmacy needs a clarification on rx     How many tablets should the pt take every three days? Please call pharmacy.

## 2022-04-08 NOTE — PROGRESS NOTES
Portions of this chart may have been created with voice recognition software. Occasional wrong-word or \"sound-alike\" substitutions may have occurred due to the inherent limitations of voice recognition software. Read the chart carefully and recognize, using context, where these substitutions have occurred        Chief Complaint     Follow-up (diabetes) and Other (sciatica pain, worse @ PM, past 4 mos, taken aleve which sometimes provides relief)               SUBJECTIVE    Nikky Zepeda is a 47 y.o. male here for follow-up of his concerns from last office visit. 1. Leg edema/2. Cellulitis of both lower extremities/3. Venous hypertension of both lower extremities    Patient's leg edema has significantly reduced. He has taken Lasix for 7 days. Has weight has also decreased from 20 pounds since the last time I saw him. Cellulitis, pruritus completely resolved. Ultrasound lower extremities were negative for any deep vein thrombosis however showed a venous hypertension in both lower extremities. Commended mild to moderate compression stockings and exercise and diet and lifestyle management to help with the edema. Also to use Lasix as needed for symptoms when they are worse. Other than these no other significant questions or concerns today. No chest pain palpitation shortness of breath. 4. Meralgia paraesthetica, left  Patient has a history of lumbar radiculopathy however he is pain in his left lower extremity that is also getting worse. He however notes that his back is completely fine this time however the pain is mostly related to the hip and upper thigh area with sharp shooting pain radiating to his entire extremity. There is occasionally burning sensation as well. Likely impingement of femoral nerve causing symptoms. Recommended gabapentin for treatment recommended weight loss and lifestyle management. Patient is agreeable for the same. Will reevaluate in his next office visit.     Patient has a followed up with endocrinologist for management of his type 2 diabetes. He has a visit coming up this week also. His medications are currently being adjusted. Patient has an appointment set up with a cardiologist for the abnormal EKG found out in the last office visit. Patient also states that he has a paperwork from his  that needs to be completed before applying for disability. They will drop them off after the office visit at the office. REVIEW OF SYSTEMS:  Pertinent symptoms noted in HPI. Lab Results   Component Value Date    WBC 8.6 03/30/2022    HGB 13.2 (L) 03/30/2022    HCT 39.3 (L) 03/30/2022    MCV 86.0 03/30/2022     03/30/2022      Lab Results   Component Value Date    LABA1C 12.8 03/17/2022     Lab Results   Component Value Date    .8 11/30/2021     Lab Results   Component Value Date    TSH 2.68 03/30/2022     Lab Results   Component Value Date    CHOL 152 11/30/2021     Lab Results   Component Value Date    TRIG 84 11/30/2021     Lab Results   Component Value Date    HDL 58 11/30/2021     Lab Results   Component Value Date    LDLCALC 77 11/30/2021     Lab Results   Component Value Date    LABVLDL 17 11/30/2021     No results found for: [facility]   Lab Results   Component Value Date     03/30/2022    K 4.4 03/30/2022     03/30/2022    CO2 24 03/30/2022    BUN 18 03/30/2022    CREATININE 0.7 (L) 03/30/2022    GLUCOSE 238 (H) 03/30/2022    CALCIUM 9.8 03/30/2022    PROT 7.4 03/30/2022    LABALBU 4.0 03/30/2022    BILITOT 0.4 03/30/2022    ALKPHOS 74 03/30/2022    AST 42 (H) 03/30/2022    ALT 59 (H) 03/30/2022    LABGLOM >60 03/30/2022    GFRAA >60 03/30/2022    AGRATIO 1.2 03/30/2022    GLOB 2.9 05/05/2021           Current Outpatient Medications   Medication Sig Dispense Refill    gabapentin (NEURONTIN) 300 MG capsule Take 1 capsule by mouth nightly for 90 days.  Intended supply: 30 days 30 capsule 2    furosemide (LASIX) 40 MG tablet Use once every 2-3 days prn edema , wt gain >5 lbs in 1 week 60 tablet 3    insulin aspart (NOVOLOG FLEXPEN) 100 UNIT/ML injection pen Inject 5 Units into the skin 3 times daily (before meals) Plus 3 units for every 50 of BG above 150 qac 10 pen 3    insulin glargine (BASAGLAR KWIKPEN) 100 UNIT/ML injection pen Inject 40 Units into the skin nightly 10 pen 3    metFORMIN (GLUCOPHAGE-XR) 500 MG extended release tablet Take 2 tablets by mouth in the morning and at bedtime 120 tablet 3    Insulin Pen Needle ([employer] PEN NEEDLES) 31G X 6 MM MISC 1 each by Does not apply route daily 100 each 3    clonazePAM (KLONOPIN) 1 MG tablet TAKE ONE TABLET BY MOUTH THREE TIMES A DAY AS NEEDED FOR ANXIETY 90 tablet 0    blood glucose monitor strips Test three times a day & as needed for symptoms of irregular blood glucose. Dispense sufficient amount for indicated testing frequency plus additional to accommodate PRN testing needs.  100 strip 5    Insulin Pen Needle ([employer] PEN NEEDLES) 31G X 6 MM MISC 1 each by Does not apply route daily 100 each 3    Continuous Blood Gluc Sensor (FREESTYLE JIMBO 2 SENSOR) MISC 1 Device by Does not apply route every 14 days 6 each 5    cloNIDine (CATAPRES) 0.3 MG tablet Take 1 tablet by mouth twice daily 60 tablet 0    triamterene-hydroCHLOROthiazide (MAXZIDE-25) 37.5-25 MG per tablet Take 1 tablet by mouth once daily 30 tablet 0    meloxicam (MOBIC) 15 MG tablet TAKE ONE TABLET BY MOUTH DAILY AS NEEDED FOR PAIN 10 tablet 0    diclofenac sodium (VOLTAREN) 1 % GEL Apply 4 g topically 4 times daily 150 g 1    prazosin (MINIPRESS) 5 MG capsule Take 1 capsule by mouth nightly 30 capsule 3    lisinopril (PRINIVIL;ZESTRIL) 40 MG tablet TAKE ONE TABLET BY MOUTH DAILY 30 tablet 4    tamsulosin (FLOMAX) 0.4 MG capsule TAKE ONE CAPSULE BY MOUTH ONCE NIGHTLY 30 capsule 5    simvastatin (ZOCOR) 20 MG tablet TAKE ONE TABLET BY MOUTH EVERY EVENING 30 tablet 5    ARIPiprazole (ABILIFY) 10 MG tablet TAKE ONE TABLET BY MOUTH DAILY 30 tablet 5    amLODIPine (NORVASC) 5 MG tablet TAKE ONE TABLET BY MOUTH DAILY 30 tablet 5    nystatin (MYCOSTATIN) 430996 UNIT/GM powder Apply 3 times daily to the groin 2 Bottle 2    COCONUT OIL PO Take by mouth      Cinnamon 500 MG CAPS Take by mouth      [employer] OMEGA-3 KRILL OIL PO Take by mouth      aspirin EC 81 MG EC tablet Take 81 mg by mouth daily. No current facility-administered medications for this visit.        No Known Allergies      Past Medical History:   Diagnosis Date    Abdominal wall hernia 2/21/2012    Anemia 4/15/2013    Claustrophobia 4/15/2013    Depression 3/8/2011    Diabetes mellitus, type 2 ([facility] [place] [address] 2/20/2012    Family history of premature coronary artery disease 6/19/2012    Hyperlipidemia 2/27/2012    Hypertension 3/8/2011    Insomnia 2/17/2012    Left atrial dilation 6/19/2012    Left ventricular hypertrophy 6/19/2012    Neuropathy     Obesity 3/8/2011    Obstructive sleep apnea 2/17/2012    uses Cpap sometimes    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 Barbie Osborne Right 2/1/2016    ankle subtower fusion with bone graft using c-arm         Family History   Problem Relation Age of Onset    Heart Disease Mother     Coronary Art Dis Mother     Heart Attack Mother     Mental Illness Mother         Social History     Socioeconomic History    Marital status:      Spouse name: Earlboby Marialuisa Number of children: 0    Years of education: None    Highest education level: None   Occupational History    Occupation:    Tobacco Use    Smoking status: Former Smoker     Packs/day: 0.25     Years: 5.00     Pack years: 1.25     Types: Cigarettes     Quit date: 2008     Years since quittin.2    Smokeless tobacco: Never Used    Tobacco comment: quit smoking about    Substance and Sexual Activity    Alcohol use: No    Drug use: No    Sexual activity: Yes     Partners: Female     Comment:  - [address]   Other Topics Concern    None   Social History Narrative    None     Social Determinants of Health     Financial Resource Strain: Low Risk     Difficulty of Paying Living Expenses: Not very hard   Food Insecurity: No Food Insecurity    Worried About Running Out of Food in the Last Year: Never true    Sylvia of Food in the Last Year: Never true   Transportation Needs: No Transportation Needs    Lack of Transportation (Medical): No    Lack of Transportation (Non-Medical):  No   Physical Activity:     Days of Exercise per Week: Not on file    Minutes of Exercise per Session: Not on file   Stress:     Feeling of Stress : Not on file   Social Connections:     Frequency of Communication with Friends and Family: Not on file    Frequency of Social Gatherings with Friends and Family: Not on file    Attends Taoist Services: Not on file    Active Member of [address] [place] [address] or Organizations: Not on file    Attends Club or Organization Meetings: Not on file    Marital Status: Not on file   Intimate Partner Violence:     Fear of Current or Ex-Partner: Not on file    Emotionally Abused: Not on file    Physically Abused: Not on file    Sexually Abused: Not on file   Housing Stability:     Unable to Pay for Housing in the Last Year: Not on file    Number of [place] in the Last Year: Not on file    Unstable Housing in the Last Year: Not on file          OBJECTIVE:      Vitals:    22 1040   BP: 138/88   Pulse: 98   Temp: 97.7 °F (36.5 °C)   TempSrc: Temporal   SpO2: 97%   Weight: (!) 372 lb 12.8 oz (169.1 kg)   Height: 5' 9\" (1.753 m)         Constitutional: Patient appears well-nourished, not in any distress. HENT:  Head: Normocephalic and atraumatic. Eyes: Conjunctivae and EOM are normal  Right Ear: External ear normal.  Left Ear: External ear normal.   Nose: Nose normal.  Mouth/Throat: Oropharynx is clear and moist.   Neck: Normal range of motion. Neck supple. Lymphatic: No cervical lymphadenopathy  Cardiovascular: Normal rate, regular rhythm, normal heart sounds and intact distal pulses. Pulmonary/Chest: Effort normal and breath sounds normal, no wheezes or rhonchi. Neurological:Patient is alert, oriented to person, place, and time. No obvious focal neurological deficits  Extremities: Mild 1+ pitting pedal edema both lower extremities up to ankle. No redness noted, no rash noted  Skin: Skin is warm and moist.  Psychiatric:Patient has a normal mood and affect, behavior is normal. Judgment and thought content normal.    ASSESSMENT AND PLAN    Ramon Lizarraga was seen today for follow-up and other. Diagnoses and all orders for this visit:    Leg edema    Cellulitis of both lower extremities    Venous hypertension of both lower extremities    Meralgia paraesthetica, left    Other orders  -     gabapentin (NEURONTIN) 300 MG capsule; Take 1 capsule by mouth nightly for 90 days. Intended supply: 30 days  -     furosemide (LASIX) 40 MG tablet; Use once every 2-3 days prn edema , wt gain >5 lbs in 1 week           DISCHARGE MEDICATION LIST        Medication List          Accurate as of April 8, 2022 11:35 AM. If you have any questions, ask your nurse or doctor. START taking these medications    gabapentin 300 MG capsule  Commonly known as: NEURONTIN  Take 1 capsule by mouth nightly for 90 days.  Intended supply: 30 days  Started by: Bravo Rogers MD        CHANGE how you take these medications    furosemide 40 MG tablet  Commonly known as: Lasix  Use once every 2-3 days prn edema , wt gain >5 lbs in 1 week  What changed:   · how much to take  · how to take this  · when to take this  · additional instructions  Changed by: Mat Tello MD        CONTINUE taking these medications    amLODIPine 5 MG tablet  Commonly known as: NORVASC  TAKE ONE TABLET BY MOUTH DAILY     ARIPiprazole 10 MG tablet  Commonly known as: ABILIFY  TAKE ONE TABLET BY MOUTH DAILY     aspirin EC 81 MG EC tablet     Basaglar KwikPen 100 UNIT/ML injection pen  Generic drug: insulin glargine  Inject 40 Units into the skin nightly     blood glucose test strips  Test three times a day & as needed for symptoms of irregular blood glucose. Dispense sufficient amount for indicated testing frequency plus additional to accommodate PRN testing needs.      Cinnamon 500 MG Caps     clonazePAM 1 MG tablet  Commonly known as: KLONOPIN  TAKE ONE TABLET BY MOUTH THREE TIMES A DAY AS NEEDED FOR ANXIETY     cloNIDine 0.3 MG tablet  Commonly known as: CATAPRES  Take 1 tablet by mouth twice daily     COCONUT OIL PO     CVS OMEGA-3 KRILL OIL PO     diclofenac sodium 1 % Gel  Commonly known as: VOLTAREN  Apply 4 g topically 4 times daily     FreeStyle Brice 2 Sensor Misc  1 Device by Does not apply route every 14 days     * [employer] Pen Needles 31G X 6 MM Misc  Generic drug: Insulin Pen Needle  1 each by Does not apply route daily     * [employer] Pen Needles 31G X 6 MM Misc  Generic drug: Insulin Pen Needle  1 each by Does not apply route daily     lisinopril 40 MG tablet  Commonly known as: PRINIVIL;ZESTRIL  TAKE ONE TABLET BY MOUTH DAILY     meloxicam 15 MG tablet  Commonly known as: MOBIC  TAKE ONE TABLET BY MOUTH DAILY AS NEEDED FOR PAIN     metFORMIN 500 MG extended release tablet  Commonly known as: GLUCOPHAGE-XR  Take 2 tablets by mouth in the morning and at bedtime     NovoLOG FlexPen 100 UNIT/ML injection pen  Generic drug: insulin aspart  Inject 5 Units into the skin 3 times daily (before meals) Plus 3 units for every 50 of BG above 150 qac     nystatin 425889 UNIT/GM powder  Commonly known as: MYCOSTATIN  Apply 3 times daily to the groin     prazosin 5 MG capsule  Commonly known as: MINIPRESS  Take 1 capsule by mouth nightly     simvastatin 20 MG tablet  Commonly known as: ZOCOR  TAKE ONE TABLET BY MOUTH EVERY EVENING     tamsulosin 0.4 MG capsule  Commonly known as: FLOMAX  TAKE ONE CAPSULE BY MOUTH ONCE NIGHTLY     triamterene-hydroCHLOROthiazide 37.5-25 MG per tablet  Commonly known as: MAXZIDE-25  Take 1 tablet by mouth once daily         * This list has 2 medication(s) that are the same as other medications prescribed for you. Read the directions carefully, and ask your doctor or other care provider to review them with you. Where to Get Your Medications      These medications were sent to [facility], [address] [place] [address] [place]    Phone: [phone]   · furosemide 40 MG tablet  · gabapentin 300 MG capsule          Return in about 3 months (around 7/8/2022), or if symptoms worsen or fail to improve. Please refer to diagnosis, orders and patient instructions for further recommendations given. Body mass index is 55.05 kg/m². . Goals of Healthy standard of living discussed. Emphasis given on appropriate diet and routine regular physical activity with cardio and strength training as much as tolerated advised. All patient's questions and concerns were addressed appropriately. All orders, handouts were reviewed in detail with the patient and patient voiced understanding verbally.

## 2022-04-11 RX ORDER — FUROSEMIDE 40 MG/1
TABLET ORAL
Qty: 60 TABLET | Refills: 3 | Status: SHIPPED | OUTPATIENT
Start: 2022-04-11 | End: 2022-06-08 | Stop reason: SDUPTHER

## 2022-04-11 NOTE — PROGRESS NOTES
[facility]     Outpatient Cardiology         Patient Name:  Minoo Erazo  Requesting Physician: No admitting provider for patient encounter. Primary Care Physician: Noemi Smith MD    Reason for Consultation/Chief Complaint:   Chief Complaint   Patient presents with    New Patient         History of Present Illness:    HPI     Sabine Dave a 47 y.o. male with PMH of DM II, depression, HLD, HTN, LVH, JUAN uses CPAP, RBBB, former smoker. Here for abnormal EKG and chest pain. Referred by PCP. Had an acute episode of chest discomfort, precordial, dull/sharp in nature, happen while he was taking out the trash can. No other associated symptoms. He does have on and off episodes of panic attacks related to prior war. No other particular trigger relieving factors  His EKG shows normal sinus rhythm with an radial branch block. No changes concerning for CAD. He does have sleep apnea and uses his CPAP  Does have JUAN, that explains EKG changes as well. Compliant with CPAP. Blood pressure well controlled current medications. He is tolerating okay simvastatin.       PMH  Past Medical History:   Diagnosis Date    Abdominal wall hernia 2/21/2012    Anemia 4/15/2013    Claustrophobia 4/15/2013    Depression 3/8/2011    Diabetes mellitus, type 2 ([address] 2/20/2012    Family history of premature coronary artery disease 6/19/2012    Hyperlipidemia 2/27/2012    Hypertension 3/8/2011    Insomnia 2/17/2012    Left atrial dilation 6/19/2012    Left ventricular hypertrophy 6/19/2012    Neuropathy     Obesity 3/8/2011    Obstructive sleep apnea 2/17/2012    uses Cpap sometimes    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    Post traumatic stress disorder 4/15/2013    Right atrial dilation 6/19/2012    Right bundle branch block 5/15/2014    Right ventricular dilation 6/19/2012       PSH  Past Surgical History:   Procedure Laterality Date    CARDIOVASCULAR STRESS TEST  May 28 2014 Non-Imaging Stress Test    CIRCUMCISION  2017    Due to phimosis: Dr. Jaden Alaniz Right 2016    ankle subtower fusion with bone graft using c-arm        Social HIstory  Social History     Tobacco Use    Smoking status: Former Smoker     Packs/day: 0.25     Years: 5.00     Pack years: 1.25     Types: Cigarettes     Quit date: 2008     Years since quittin.3    Smokeless tobacco: Never Used    Tobacco comment: quit smoking about    Substance Use Topics    Alcohol use: No    Drug use: No       Family History  Family History   Problem Relation Age of Onset    Heart Disease Mother     Coronary Art Dis Mother     Heart Attack Mother     Mental Illness Mother        Allergies   No Known Allergies    Medications:     Home Medications:  Were reviewed and are listed in nursing record. and/or listed below    Prior to Admission medications    Medication Sig Start Date End Date Taking? Authorizing Provider   insulin aspart (NOVOLOG FLEXPEN) 100 UNIT/ML injection pen Inject 15 Units into the skin 3 times daily (before meals) Plus 3 units for every 50 of BG above 150 qac 22  Yes Franki Armendariz MD   insulin glargine (BASAGLAR KWIKPEN) 100 UNIT/ML injection pen Inject 30 Units into the skin 2 times daily 22  Yes Franki Armendariz MD   furosemide (LASIX) 40 MG tablet Use once a day every 2-3 days prn edema , wt gain >5 lbs in 1 week 22  Yes Alsyia Roy MD   gabapentin (NEURONTIN) 300 MG capsule Take 1 capsule by mouth nightly for 90 days.  Intended supply: 30 days 22 Yes Alysia Roy MD   metFORMIN (GLUCOPHAGE-XR) 500 MG extended release tablet Take 2 tablets by mouth in the morning and at bedtime 22  Yes Franki Armendariz MD   Insulin Pen Needle ([employer] PEN NEEDLES) 31G X 6 MM MISC 1 each by Does not apply route daily 3/22/22  Yes VAMSI Flowers - CNP   clonazePAM (KLONOPIN) 1 MG tablet TAKE ONE TABLET BY MOUTH THREE TIMES A DAY AS NEEDED FOR ANXIETY 3/17/22 4/20/22 Yes VAMSI Christensen CNP   blood glucose monitor strips Test three times a day & as needed for symptoms of irregular blood glucose. Dispense sufficient amount for indicated testing frequency plus additional to accommodate PRN testing needs.  3/17/22  Yes VAMSI Romo CNP   Insulin Pen Needle ([employer] PEN NEEDLES) 31G X 6 MM MISC 1 each by Does not apply route daily 3/17/22  Yes VAMSI Theodore CNP   Continuous Blood Gluc Sensor (FREESTYLE JIMBO 2 SENSOR) MISC 1 Device by Does not apply route every 14 days 3/17/22  Yes VAMSI Christensen CNP   cloNIDine (CATAPRES) 0.3 MG tablet Take 1 tablet by mouth twice daily 3/16/22  Yes Thomas Zendejas MD   triamterene-hydroCHLOROthiazide (MAXZIDE-25) 37.5-25 MG per tablet Take 1 tablet by mouth once daily 3/16/22  Yes Thomas Zendejas MD   meloxicam (MOBIC) 15 MG tablet TAKE ONE TABLET BY MOUTH DAILY AS NEEDED FOR PAIN 3/9/22  Yes Jerome Mckenzie DO   diclofenac sodium (VOLTAREN) 1 % GEL Apply 4 g topically 4 times daily 1/31/22  Yes Jerome Mckenzie DO   prazosin (MINIPRESS) 5 MG capsule Take 1 capsule by mouth nightly 1/20/22  Yes Jef Younger DO   lisinopril (PRINIVIL;ZESTRIL) 40 MG tablet TAKE ONE TABLET BY MOUTH DAILY 1/3/22  Yes Jerome Mckenzie DO   tamsulosin (FLOMAX) 0.4 MG capsule TAKE ONE CAPSULE BY MOUTH ONCE NIGHTLY 11/29/21  Yes Jerome Mckenzie DO   simvastatin (ZOCOR) 20 MG tablet TAKE ONE TABLET BY MOUTH EVERY EVENING 11/29/21  Yes Jerome Mckenzie DO   ARIPiprazole (ABILIFY) 10 MG tablet TAKE ONE TABLET BY MOUTH DAILY 11/29/21  Yes Jerome Mckenzie DO   amLODIPine (NORVASC) 5 MG tablet TAKE ONE TABLET BY MOUTH DAILY 4/27/21  Yes Jerome Mckenzie DO   nystatin (MYCOSTATIN) 323042 UNIT/GM powder Apply 3 times daily to the groin 12/2/20  Yes Jerome Mckenzie DO   COCONUT OIL PO Take by mouth   Yes Historical Physical Exam  Constitutional:       General: He is not in acute distress. Appearance: He is well-developed. He is not diaphoretic. HENT:      Head: Normocephalic and atraumatic. Eyes:      Pupils: Pupils are equal, round, and reactive to light. Neck:      Thyroid: No thyromegaly. Vascular: No JVD. Cardiovascular:      Rate and Rhythm: Normal rate and regular rhythm. Chest Wall: PMI is not displaced. Heart sounds: Normal heart sounds, S1 normal and S2 normal. No murmur heard. No friction rub. No gallop. Pulmonary:      Effort: Pulmonary effort is normal. No respiratory distress. Breath sounds: Normal breath sounds. No stridor. No wheezing or rales. Chest:      Chest wall: No tenderness. Abdominal:      General: Bowel sounds are normal. There is no distension. Palpations: Abdomen is soft. Tenderness: There is no abdominal tenderness. There is no guarding or rebound. Musculoskeletal:         General: No tenderness. Normal range of motion. Cervical back: Normal range of motion. Lymphadenopathy:      Cervical: No cervical adenopathy. Skin:     General: Skin is warm and dry. Findings: No erythema or rash. Neurological:      Mental Status: He is alert and oriented to person, place, and time. Coordination: Coordination normal.   Psychiatric:         Behavior: Behavior normal.         Thought Content:  Thought content normal.         Judgment: Judgment normal.         Labs:       Lab Results   Component Value Date    WBC 8.6 03/30/2022    HGB 13.2 (L) 03/30/2022    HCT 39.3 (L) 03/30/2022    MCV 86.0 03/30/2022     03/30/2022     Lab Results   Component Value Date     03/30/2022    K 4.4 03/30/2022     03/30/2022    CO2 24 03/30/2022    BUN 18 03/30/2022    CREATININE 0.7 (L) 03/30/2022    GLUCOSE 238 (H) 03/30/2022    CALCIUM 9.8 03/30/2022    PROT 7.4 03/30/2022    LABALBU 4.0 03/30/2022    BILITOT 0.4 03/30/2022    ALKPHOS 74 03/30/2022    AST 42 (H) 03/30/2022    ALT 59 (H) 03/30/2022    LABGLOM >60 03/30/2022    GFRAA >60 03/30/2022    AGRATIO 1.2 03/30/2022    GLOB 2.9 05/05/2021         Lab Results   Component Value Date    CHOL 152 11/30/2021    CHOL 149 05/05/2021    CHOL 176 03/17/2021     Lab Results   Component Value Date    TRIG 84 11/30/2021    TRIG 98 05/05/2021    TRIG 107 03/17/2021     Lab Results   Component Value Date    HDL 58 11/30/2021    HDL 55 05/05/2021    HDL 56 03/17/2021     Lab Results   Component Value Date    LDLCALC 77 11/30/2021    LDLCALC 74 05/05/2021    LDLCALC 99 03/17/2021     Lab Results   Component Value Date    LABVLDL 17 11/30/2021    LABVLDL 20 05/05/2021    LABVLDL 21 03/17/2021     No results found for: CHOLHDLRATIO    Lab Results   Component Value Date    INR 0.99 08/16/2016    INR 0.98 07/06/2012    PROTIME 11.3 08/16/2016    PROTIME 11.2 07/06/2012       The 10-year ASCVD risk score (Ct Bernard, et al., 2013) is: 5.9%    Values used to calculate the score:      Age: 47 years      Sex: Male      Is Non- : No      Diabetic: Yes      Tobacco smoker: No      Systolic Blood Pressure: 886 mmHg      Is BP treated: Yes      HDL Cholesterol: 58 mg/dL      Total Cholesterol: 152 mg/dL      Imaging:       Last ECG (if available, Personally interpreted):    Last Monitor/Holter (if available):    Last Stress (if available): 8/16/16  Impression       Normal myocardial perfusion scan.        No findings of pharmacologic stress induced reversible ischemia. Last Cath (if available):    Last TTE/AUGIE(if available): 6/13/14  Summary    Left ventricular size appears mildly increased . Mild concentric left    ventricular hypertrophy is present. Systolic function is normal with an    estimated ejection fraction of 55%. No regional wall motion abnormalities    are seen. Trivial aortic regurgitation is present. The left atrium is    moderately dilated.      Last CMR  (if available):    Last Coronary Artery Calcium Score: Ankle-brachial index:    Carotid ultrasound screening:    Abdominal aortic aneurysm screening:       Assessment / Plan:     Hypertension  Well-controlled on amlodipine, clonidine, Lasix    Hyperlipidemia  Continue simvastatin. Plan for coronary calcium score for screening purposes. Precordial pain  Unlikely to be cardiac in nature. Had normal stress test in the past.  Plan for coronary calcium score for screening purposes, if significant elevated repeat a stress test.  He seems to be having some anxiety problems due to PTSD. Abnormal EKG is consistent with sleep apnea    Follow up in 1 months. I had the opportunity to review the clinical symptoms and presentation of Herlinda Edgar. Patient's allergies and medications were reviewed and updated. Patient's past medical, surgical, social and family history were reviewed and updated. Patient's testing including laboratory, ECGs, monitor, imaging (TTE,QUINCY,CMR,cath) were reviewed. Tobacco use was discussed with the patient and educated on the negative effects. I have asked the patient to not utilize these agents. All questions and concerns were addressed to the patient/family. Alternatives to my treatment were discussed. The note was completed using EMR. Every effort wasmade to ensure accuracy; however, inadvertent computerized transcription errors may be present. Thank you for allowing me to participate in thecare or [address] [facility] KYAW Sadler MD, [facility] [place], [facility] [place] [address]

## 2022-04-14 ENCOUNTER — TELEPHONE (OUTPATIENT)
Dept: FAMILY MEDICINE CLINIC | Age: 52
End: 2022-04-14

## 2022-04-14 DIAGNOSIS — I10 ESSENTIAL HYPERTENSION: Chronic | ICD-10-CM

## 2022-04-14 RX ORDER — CLONIDINE HYDROCHLORIDE 0.3 MG/1
TABLET ORAL
Qty: 60 TABLET | Refills: 0 | OUTPATIENT
Start: 2022-04-14

## 2022-04-14 RX ORDER — TRIAMTERENE AND HYDROCHLOROTHIAZIDE 37.5; 25 MG/1; MG/1
TABLET ORAL
Qty: 30 TABLET | Refills: 0 | OUTPATIENT
Start: 2022-04-14

## 2022-04-14 NOTE — TELEPHONE ENCOUNTER
----- Message from Leo Mukherjee sent at 4/14/2022  7:52 AM EDT -----  Subject: Message to Provider    QUESTIONS  Information for Provider? Pt left paperwork at last appt. (Clinical care   paperwork). He wants to see if it can be sent to their  or can they   . Please call pt to advise.  ---------------------------------------------------------------------------  --------------  CALL BACK INFO  What is the best way for the office to contact you? OK to leave message on   voicemail  Preferred Call Back Phone Number? [phone]  ---------------------------------------------------------------------------  --------------  SCRIPT ANSWERS  Relationship to Patient? Other  Representative Name? Juan Melendez  Is the Representative on the appropriate HIPAA document in Epic?  Yes

## 2022-04-15 ENCOUNTER — TELEPHONE (OUTPATIENT)
Dept: ENDOCRINOLOGY | Age: 52
End: 2022-04-15

## 2022-04-15 NOTE — TELEPHONE ENCOUNTER
I do not see the functional capacity evaluation statement from Jose Cruz Rapp her physical therapist.  Please advise patient to  the paperwork from our clinic and get the evaluation completed for me to complete the rest of the paperwork.

## 2022-04-18 ENCOUNTER — PROCEDURE VISIT (OUTPATIENT)
Dept: ENDOCRINOLOGY | Age: 52
End: 2022-04-18
Payer: COMMERCIAL

## 2022-04-18 PROCEDURE — 95251 CONT GLUC MNTR ANALYSIS I&R: CPT | Performed by: INTERNAL MEDICINE

## 2022-04-18 NOTE — PROGRESS NOTES
CGMS Download Review and Recommendations  [employer] personal CGMS data downloaded and reviewed. See scanned document for blood glucose data documentation    Average glucose 294± 19.4 SD  Time in range: 1 %  Time above 180: 99%  Time under 70: 0%   GMI 10.3%    Basal pattern review: Severe basal hyperglycemia  Postprandial pattern review: Postprandial hyperglycemia  Hypoglycemia review: No hypoglycemia  Activity related review: Not recorded      Based on the data, I recommend:    1. Increase basal insulin Basaglar to 30 units twice daily. 2.  Increase NovoLog insulin before meals to 15 units. 3.  Continue same correction scale 3 units for every 50 of blood glucose above 150 before every meal.    4.  Send blood glucose records weekly. Recommend very close monitoring and further adjustments as indicated. 5.  Dietitian consultation. I discussed recommendations with patient and his wife in details. Patient stated understanding.     Clint Hawkins MD

## 2022-04-20 ENCOUNTER — OFFICE VISIT (OUTPATIENT)
Dept: CARDIOLOGY CLINIC | Age: 52
End: 2022-04-20
Payer: COMMERCIAL

## 2022-04-20 VITALS
WEIGHT: 315 LBS | BODY MASS INDEX: 46.65 KG/M2 | SYSTOLIC BLOOD PRESSURE: 122 MMHG | HEIGHT: 69 IN | OXYGEN SATURATION: 98 % | DIASTOLIC BLOOD PRESSURE: 72 MMHG | HEART RATE: 109 BPM

## 2022-04-20 DIAGNOSIS — R07.2 PRECORDIAL PAIN: ICD-10-CM

## 2022-04-20 DIAGNOSIS — E78.2 MIXED HYPERLIPIDEMIA: Primary | ICD-10-CM

## 2022-04-20 DIAGNOSIS — I10 PRIMARY HYPERTENSION: Chronic | ICD-10-CM

## 2022-04-20 PROCEDURE — 99204 OFFICE O/P NEW MOD 45 MIN: CPT | Performed by: INTERNAL MEDICINE

## 2022-04-20 ASSESSMENT — ENCOUNTER SYMPTOMS
BLOOD IN STOOL: 0
SHORTNESS OF BREATH: 0
ABDOMINAL DISTENTION: 0
ABDOMINAL PAIN: 0
COUGH: 0
FACIAL SWELLING: 0
CHEST TIGHTNESS: 0
BACK PAIN: 0
COLOR CHANGE: 0
EYE DISCHARGE: 0
VOMITING: 0
WHEEZING: 0

## 2022-04-20 NOTE — ASSESSMENT & PLAN NOTE
Unlikely to be cardiac in nature. Had normal stress test in the past.  Plan for coronary calcium score for screening purposes, if significant elevated repeat a stress test.  He seems to be having some anxiety problems due to PTSD.

## 2022-04-21 RX ORDER — INSULIN GLARGINE 100 [IU]/ML
INJECTION, SOLUTION SUBCUTANEOUS
Qty: 15 ML | OUTPATIENT
Start: 2022-04-21

## 2022-04-27 DIAGNOSIS — I10 ESSENTIAL HYPERTENSION: Chronic | ICD-10-CM

## 2022-04-27 RX ORDER — TRIAMTERENE AND HYDROCHLOROTHIAZIDE 37.5; 25 MG/1; MG/1
TABLET ORAL
Qty: 90 TABLET | Refills: 1 | Status: SHIPPED | OUTPATIENT
Start: 2022-04-27 | End: 2022-06-08 | Stop reason: SDUPTHER

## 2022-04-27 RX ORDER — CLONIDINE HYDROCHLORIDE 0.3 MG/1
TABLET ORAL
Qty: 180 TABLET | Refills: 1 | Status: SHIPPED | OUTPATIENT
Start: 2022-04-27 | End: 2022-06-08 | Stop reason: SDUPTHER

## 2022-04-27 NOTE — TELEPHONE ENCOUNTER
Requested Prescriptions     Pending Prescriptions Disp Refills    cloNIDine (CATAPRES) 0.3 MG tablet [Pharmacy Med Name: cloNIDine HCl 0.3 MG Oral Tablet] 60 tablet 0     Sig: Take 1 tablet by mouth twice daily    triamterene-hydroCHLOROthiazide (MAXZIDE-25) 37.5-25 MG per tablet [Pharmacy Med Name: Triamterene-HCTZ 37.5-25 MG Oral Tablet] 30 tablet 0     Sig: Take 1 tablet by mouth once daily     Last ov 3/30/22  Last lab 3/30/22  Next ov n/a

## 2022-04-29 ENCOUNTER — ANESTHESIA EVENT (OUTPATIENT)
Dept: ENDOSCOPY | Age: 52
End: 2022-04-29
Payer: COMMERCIAL

## 2022-04-29 ENCOUNTER — ANESTHESIA (OUTPATIENT)
Dept: ENDOSCOPY | Age: 52
End: 2022-04-29
Payer: COMMERCIAL

## 2022-04-29 ENCOUNTER — HOSPITAL ENCOUNTER (OUTPATIENT)
Age: 52
Setting detail: OUTPATIENT SURGERY
Discharge: HOME | End: 2022-04-29
Attending: INTERNAL MEDICINE | Admitting: INTERNAL MEDICINE
Payer: COMMERCIAL

## 2022-04-29 VITALS
OXYGEN SATURATION: 100 % | RESPIRATION RATE: 18 BRPM | DIASTOLIC BLOOD PRESSURE: 101 MMHG | SYSTOLIC BLOOD PRESSURE: 177 MMHG

## 2022-04-29 VITALS
DIASTOLIC BLOOD PRESSURE: 93 MMHG | HEART RATE: 83 BPM | RESPIRATION RATE: 18 BRPM | HEIGHT: 71 IN | SYSTOLIC BLOOD PRESSURE: 170 MMHG | TEMPERATURE: 97.7 F | WEIGHT: 315 LBS | BODY MASS INDEX: 44.1 KG/M2 | OXYGEN SATURATION: 96 %

## 2022-04-29 DIAGNOSIS — Z12.11 COLON CANCER SCREENING: ICD-10-CM

## 2022-04-29 LAB
GLUCOSE BLD-MCNC: 275 MG/DL (ref 70–99)
PERFORMED ON: ABNORMAL

## 2022-04-29 PROCEDURE — 88305 TISSUE EXAM BY PATHOLOGIST: CPT

## 2022-04-29 PROCEDURE — 3609010600 HC COLONOSCOPY POLYPECTOMY SNARE/COLD BIOPSY: Performed by: INTERNAL MEDICINE

## 2022-04-29 PROCEDURE — 6360000002 HC RX W HCPCS: Performed by: NURSE ANESTHETIST, CERTIFIED REGISTERED

## 2022-04-29 PROCEDURE — 2580000003 HC RX 258: Performed by: ANESTHESIOLOGY

## 2022-04-29 PROCEDURE — 7100000010 HC PHASE II RECOVERY - FIRST 15 MIN: Performed by: INTERNAL MEDICINE

## 2022-04-29 PROCEDURE — 3700000001 HC ADD 15 MINUTES (ANESTHESIA): Performed by: INTERNAL MEDICINE

## 2022-04-29 PROCEDURE — 2500000003 HC RX 250 WO HCPCS: Performed by: NURSE ANESTHETIST, CERTIFIED REGISTERED

## 2022-04-29 PROCEDURE — 7100000011 HC PHASE II RECOVERY - ADDTL 15 MIN: Performed by: INTERNAL MEDICINE

## 2022-04-29 PROCEDURE — 2709999900 HC NON-CHARGEABLE SUPPLY: Performed by: INTERNAL MEDICINE

## 2022-04-29 PROCEDURE — 3700000000 HC ANESTHESIA ATTENDED CARE: Performed by: INTERNAL MEDICINE

## 2022-04-29 RX ORDER — LIDOCAINE HYDROCHLORIDE 20 MG/ML
INJECTION, SOLUTION INFILTRATION; PERINEURAL PRN
Status: DISCONTINUED | OUTPATIENT
Start: 2022-04-29 | End: 2022-04-29 | Stop reason: SDUPTHER

## 2022-04-29 RX ORDER — SODIUM CHLORIDE, SODIUM LACTATE, POTASSIUM CHLORIDE, CALCIUM CHLORIDE 600; 310; 30; 20 MG/100ML; MG/100ML; MG/100ML; MG/100ML
INJECTION, SOLUTION INTRAVENOUS CONTINUOUS
Status: DISCONTINUED | OUTPATIENT
Start: 2022-04-29 | End: 2022-04-29 | Stop reason: HOSPADM

## 2022-04-29 RX ORDER — PROPOFOL 10 MG/ML
INJECTION, EMULSION INTRAVENOUS PRN
Status: DISCONTINUED | OUTPATIENT
Start: 2022-04-29 | End: 2022-04-29 | Stop reason: SDUPTHER

## 2022-04-29 RX ORDER — LIDOCAINE HYDROCHLORIDE 10 MG/ML
1 INJECTION, SOLUTION EPIDURAL; INFILTRATION; INTRACAUDAL; PERINEURAL
Status: DISCONTINUED | OUTPATIENT
Start: 2022-04-29 | End: 2022-04-29 | Stop reason: HOSPADM

## 2022-04-29 RX ORDER — SODIUM CHLORIDE 0.9 % (FLUSH) 0.9 %
5-40 SYRINGE (ML) INJECTION EVERY 12 HOURS SCHEDULED
Status: DISCONTINUED | OUTPATIENT
Start: 2022-04-29 | End: 2022-04-29 | Stop reason: HOSPADM

## 2022-04-29 RX ORDER — SODIUM CHLORIDE 9 MG/ML
INJECTION, SOLUTION INTRAVENOUS PRN
Status: DISCONTINUED | OUTPATIENT
Start: 2022-04-29 | End: 2022-04-29 | Stop reason: HOSPADM

## 2022-04-29 RX ORDER — SODIUM CHLORIDE 0.9 % (FLUSH) 0.9 %
5-40 SYRINGE (ML) INJECTION PRN
Status: DISCONTINUED | OUTPATIENT
Start: 2022-04-29 | End: 2022-04-29 | Stop reason: HOSPADM

## 2022-04-29 RX ORDER — LIDOCAINE HYDROCHLORIDE 20 MG/ML
INJECTION, SOLUTION INFILTRATION; PERINEURAL PRN
Status: DISCONTINUED | OUTPATIENT
Start: 2022-04-29 | End: 2022-04-29

## 2022-04-29 RX ADMIN — PROPOFOL 20 MG: 10 INJECTION, EMULSION INTRAVENOUS at 13:59

## 2022-04-29 RX ADMIN — PROPOFOL 50 MG: 10 INJECTION, EMULSION INTRAVENOUS at 13:51

## 2022-04-29 RX ADMIN — PROPOFOL 20 MG: 10 INJECTION, EMULSION INTRAVENOUS at 14:05

## 2022-04-29 RX ADMIN — PROPOFOL 20 MG: 10 INJECTION, EMULSION INTRAVENOUS at 13:54

## 2022-04-29 RX ADMIN — PROPOFOL 10 MG: 10 INJECTION, EMULSION INTRAVENOUS at 13:53

## 2022-04-29 RX ADMIN — PROPOFOL 10 MG: 10 INJECTION, EMULSION INTRAVENOUS at 13:58

## 2022-04-29 RX ADMIN — PROPOFOL 20 MG: 10 INJECTION, EMULSION INTRAVENOUS at 14:09

## 2022-04-29 RX ADMIN — SODIUM CHLORIDE, POTASSIUM CHLORIDE, SODIUM LACTATE AND CALCIUM CHLORIDE: 600; 310; 30; 20 INJECTION, SOLUTION INTRAVENOUS at 13:25

## 2022-04-29 RX ADMIN — PROPOFOL 10 MG: 10 INJECTION, EMULSION INTRAVENOUS at 14:13

## 2022-04-29 RX ADMIN — PROPOFOL 20 MG: 10 INJECTION, EMULSION INTRAVENOUS at 13:56

## 2022-04-29 RX ADMIN — LIDOCAINE HYDROCHLORIDE 100 MG: 20 INJECTION, SOLUTION INFILTRATION; PERINEURAL at 13:51

## 2022-04-29 RX ADMIN — PROPOFOL 20 MG: 10 INJECTION, EMULSION INTRAVENOUS at 14:02

## 2022-04-29 ASSESSMENT — PULMONARY FUNCTION TESTS
PIF_VALUE: 0
PIF_VALUE: 0
PIF_VALUE: 1
PIF_VALUE: 0
PIF_VALUE: 1
PIF_VALUE: 0
PIF_VALUE: 0
PIF_VALUE: 1
PIF_VALUE: 1
PIF_VALUE: 0

## 2022-04-29 ASSESSMENT — PAIN SCALES - GENERAL: PAINLEVEL_OUTOF10: 0

## 2022-04-29 ASSESSMENT — PAIN - FUNCTIONAL ASSESSMENT: PAIN_FUNCTIONAL_ASSESSMENT: 0-10

## 2022-04-29 NOTE — ANESTHESIA POSTPROCEDURE EVALUATION
Department of Anesthesiology  Postprocedure Note    Patient: Javier Du  MRN: [id number]  YOB: 1970  Date of evaluation: 4/29/2022  Time:  2:41 PM     Procedure Summary     Date: 04/29/22 Room / Location: [facility]    Anesthesia Start: 9866 Anesthesia Stop: 1416    Procedure: COLONOSCOPY POLYPECTOMY SNARE/COLD BIOPSY Diagnosis:       Colon cancer screening      (Colon cancer screening [Z12.11])    Surgeons: Cait Ron MD Responsible Provider: Dillon Moffett MD    Anesthesia Type: MAC ASA Status: 3          Anesthesia Type: MAC    Tushar Phase I: Tushar Score: 10    Tushar Phase II:      Last vitals: Reviewed and per EMR flowsheets.        Anesthesia Post Evaluation    Patient location during evaluation: PACU  Level of consciousness: awake  Complications: no  Multimodal analgesia pain management approach

## 2022-04-29 NOTE — PROCEDURES
Colonoscopy REPORT    Patient:  Mellisa Torrez                  1970    Referring Physician:  Dawna Hurd    Endoscopist: Savanah Ann     Indication:  Screening     Medications:  MAC      Pre-Anesthesia Assessment:  I have reviewed and am in agreement with patient history and medication, including previous response to sedation. Prior to the procedure, a History and Physical was performed, and patient medications and allergies were reviewed. The risks and benefits of the procedure and the sedation options and risks were discussed with the patient. Complications included but were not limited to infection, bleeding, perforation, death, and missed lesions. All questions were answered and informed consent was obtained by the patient. Patient identification and proposed procedure were verified by the physician and the nurse in the pre-procedure area and in the procedure room. Mallampatti: II  ASA Grade Assessment: 2    After reviewing the risks and benefits, the patient was deemed in satisfactory condition to undergo the procedure. The anesthesia plan was to use MAC sedation. Immediately prior to administration of medications, the patient was re-assessed for adequacy to receive sedatives and a time out was performed. Patient and healthcare providers were in agreement it was the correct patient and procedure. The heart rate, respiratory rate, oxygen saturations, blood pressure, adequacy of pulmonary ventilation, and response to care were monitored throughout the procedure. The physical status of the patient was re-assessed after the procedure. After adequate sedation was achieved in stepwise fashion a rectal examination was performed and was normal.     The pediatric colonoscope was then advanced atraumatically into the rectum and advanced without difficulty to the cecum. The cecum was identified by the appendiceal orifice the ileocecal valve and other normal anatomy and a picture was obtained.       The scope was then withdrawn (>6minutes) with close inspection of the mucosa in a circumferential manor. TI normal. Retroflexed views under the ICV and of the right colon obtained. 9 polyps 3 to 6mm removed with cold snare from the colon. Retroflexed views of the rectum show hemorrhoids. No immediate complications. The preparation was good. Estimated blood loss none    Impression:  9 polyps  hemorrhoids  Plan:  The patient is aware it is their responsibility to call for biopsy results in 7 days. Repeat colonoscopy in 3 years.

## 2022-04-29 NOTE — ANESTHESIA PRE PROCEDURE
Department of Anesthesiology  Preprocedure Note       Name:  Adama Stephenson   Age:  47 y.o.  :  1970                                          MRN:  [id number]         Date:  2022      Surgeon: Antonio Rich):  Yohana Mccollum MD    Procedure: Procedure(s):  COLONOSCOPY    Medications prior to admission:   Prior to Admission medications    Medication Sig Start Date End Date Taking? Authorizing Provider   cloNIDine (CATAPRES) 0.3 MG tablet Take 1 tablet by mouth twice daily 22   Thomas Zendejas MD   triamterene-hydroCHLOROthiazide (MAXZIDE-25) 37.5-25 MG per tablet Take 1 tablet by mouth once daily 22   Thomas Zendejas MD   insulin aspart (NOVOLOG FLEXPEN) 100 UNIT/ML injection pen Inject 15 Units into the skin 3 times daily (before meals) Plus 3 units for every 50 of BG above 150 qac 22   Lainey Marcial MD   insulin glargine [facility]) 100 UNIT/ML injection pen Inject 30 Units into the skin 2 times daily 22   Lainey Marcial MD   furosemide (LASIX) 40 MG tablet Use once a day every 2-3 days prn edema , wt gain >5 lbs in 1 week 22   Thomas Zendejas MD   gabapentin (NEURONTIN) 300 MG capsule Take 1 capsule by mouth nightly for 90 days. Intended supply: 30 days 22  Thomas Zendejas MD   metFORMIN (GLUCOPHAGE-XR) 500 MG extended release tablet Take 2 tablets by mouth in the morning and at bedtime 22   Lainey Marcial MD   Insulin Pen Needle (Jamie Pauma PEN NEEDLES) 31G X 6 MM MISC 1 each by Does not apply route daily 3/22/22   Mike Dilling, APRN - CNP   clonazePAM (KLONOPIN) 1 MG tablet TAKE ONE TABLET BY MOUTH THREE TIMES A DAY AS NEEDED FOR ANXIETY 3/17/22 4/20/22  Mike Dilling, APRN - CNP   blood glucose monitor strips Test three times a day & as needed for symptoms of irregular blood glucose. Dispense sufficient amount for indicated testing frequency plus additional to accommodate PRN testing needs.  3/17/22   Mike Dilling, APRN - CNP   Insulin Pen Needle (Jamie Pauma PEN NEEDLES) 31G X 6 MM MISC 1 each by Does not apply route daily 3/17/22   VAMSI Tristan CNP   Continuous Blood Gluc Sensor (FREESTYLE JIMBO 2 SENSOR) MISC 1 Device by Does not apply route every 14 days 3/17/22   Ashlee GoVAMSI hathaway CNP   meloxicam (MOBIC) 15 MG tablet TAKE ONE TABLET BY MOUTH DAILY AS NEEDED FOR PAIN 3/9/22   Melly Mckenzie, DO   diclofenac sodium (VOLTAREN) 1 % GEL Apply 4 g topically 4 times daily 1/31/22   Melly Mckenzie, DO   prazosin (MINIPRESS) 5 MG capsule Take 1 capsule by mouth nightly 1/20/22   Melly Mckenzie, DO   lisinopril (PRINIVIL;ZESTRIL) 40 MG tablet TAKE ONE TABLET BY MOUTH DAILY 1/3/22   Melly Mckenzie, DO   tamsulosin (FLOMAX) 0.4 MG capsule TAKE ONE CAPSULE BY MOUTH ONCE NIGHTLY 11/29/21   Melly Mckenzie, DO   simvastatin (ZOCOR) 20 MG tablet TAKE ONE TABLET BY MOUTH EVERY EVENING 11/29/21   Melly Mckenzie, DO   ARIPiprazole (ABILIFY) 10 MG tablet TAKE ONE TABLET BY MOUTH DAILY 11/29/21   Melly Mckenzie, DO   amLODIPine (NORVASC) 5 MG tablet TAKE ONE TABLET BY MOUTH DAILY 4/27/21   Melly Mckenzie, DO   nystatin (MYCOSTATIN) 033946 UNIT/GM powder Apply 3 times daily to the groin 12/2/20   Tor Mckenzie, DO   COCONUT OIL PO Take by mouth    Historical Provider, MD   Cinnamon 500 MG CAPS Take by mouth    Historical Provider, MD   [employer] OMEGA-3 KRILL OIL PO Take by mouth    Historical Provider, MD   aspirin EC 81 MG EC tablet Take 81 mg by mouth daily. Historical Provider, MD       Current medications:    No current facility-administered medications for this encounter.        Allergies:  No Known Allergies    Problem List:    Patient Active Problem List   Diagnosis Code    Hypertension I10    Moderate episode of recurrent major depressive disorder (HCC) F33.1    Right leg pain M79.604    Eczema L30.9    Right lumbar radiculopathy M54.16    Urinary urgency R39.15    Obstructive sleep apnea G47.33    Memory loss R41.3    Abdominal mass R19.00    Diabetes mellitus, type 2 ([facility]) E11.9    Abdominal wall hernia K43.9    Hyperlipidemia E78.5    Chest pain R07.9    Left ventricular hypertrophy I51.7    Left atrial dilation I51.7    Right atrial dilation I51.7    Right ventricular dilation I51.7    Syncope R55    Family history of premature coronary artery disease Z82.49    Post traumatic stress disorder F43.10    Claustrophobia F40.240    Anemia D64.9    Right foot pain M79.671    Left foot pain M79.672    Right bundle branch block I45.10    Right ankle pain M25.571    BMI 50.0-59.9, adult ([facility]) Z68.43    Type 2 diabetes mellitus with hemoglobin A1c goal of 7.0%-8.0% ([facility]) E11.9    Ankle arthritis M19.079    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    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    Osteoarthritis of right subtalar joint M19.071    Tarsal coalitions Q66.89    Ankle abrasion with infection S90.519A, L08.9    Wound infection after surgery T81.49XA    Type 2 diabetes mellitus without complication ([facility]) G74.3    Class 3 severe obesity with body mass index (BMI) of 50.0 to 59.9 in adult ([facility]) E66.01, Z68.43    Insomnia due to mental disorder F51.05    RYAN (generalized anxiety disorder) F41.1    Primary osteoarthritis M19.91    Sciatica of left side M54.32    Type 2 diabetes mellitus, uncontrolled, with neuropathy ([facility]) E11.40, E11.65    Elevated liver function tests R79.89    Precordial pain R07.2       Past Medical History:        Diagnosis Date    Abdominal wall hernia 2/21/2012    Anemia 4/15/2013    Claustrophobia 4/15/2013    Depression 3/8/2011    Diabetes mellitus, type 2 ([facility] [address] 2/20/2012    Family history of premature coronary artery disease 6/19/2012    Hyperlipidemia 2/27/2012    Hypertension 3/8/2011    Insomnia 2/17/2012    Left atrial dilation 6/19/2012    Left ventricular hypertrophy 6/19/2012    Neuropathy     Obesity 3/8/2011    Obstructive sleep apnea 2012    uses Cpap sometimes    Osteoarthritis of subtalar joint 2016    Post traumatic stress disorder 4/15/2013    Right atrial dilation 2012    Right bundle branch block 5/15/2014    Right ventricular dilation 2012       Past Surgical History:        Procedure Laterality Date    CARDIOVASCULAR STRESS TEST  May 28 2014    Non-Imaging Stress Test    CIRCUMCISION  2017    Due to phimosis: Dr. Adonay Bueno Right 2016    ankle subtower fusion with bone graft using c-arm       Social History:    Social History     Tobacco Use    Smoking status: Former Smoker     Packs/day: 0.25     Years: 5.00     Pack years: 1.25     Types: Cigarettes     Quit date: 2008     Years since quittin.3    Smokeless tobacco: Never Used    Tobacco comment: quit smoking about    Substance Use Topics    Alcohol use: No                                Counseling given: Not Answered  Comment: quit smoking about       Vital Signs (Current):   Vitals:    22 1155   BP: (!) 171/100   Pulse: 96   Resp: 16   Temp: 96.7 °F (35.9 °C)   TempSrc: Temporal   SpO2: 96%   Weight: (!) 385 lb (174.6 kg)   Height: 5' 11\" (1.803 m)                                              BP Readings from Last 3 Encounters:   22 (!) 171/100   22 122/72   22 138/88       NPO Status:                                                                                 BMI:   Wt Readings from Last 3 Encounters:   22 (!) 385 lb (174.6 kg)   22 (!) 372 lb (168.7 kg)   22 (!) 372 lb 12.8 oz (169.1 kg)     Body mass index is 53.7 kg/m².     CBC:   Lab Results   Component Value Date    WBC 8.6 2022    RBC 4.58 2022    HGB 13.2 2022    HCT 39.3 2022    MCV 86.0 2022    RDW 13.5 2022     2022       CMP:   Lab Results   Component Value Date     2022    K 4.4 2022  03/30/2022    CO2 24 03/30/2022    BUN 18 03/30/2022    CREATININE 0.7 03/30/2022    GFRAA >60 03/30/2022    GFRAA >60 04/16/2013    AGRATIO 1.2 03/30/2022    LABGLOM >60 03/30/2022    GLUCOSE 238 03/30/2022    PROT 7.4 03/30/2022    PROT 6.4 03/29/2016    CALCIUM 9.8 03/30/2022    BILITOT 0.4 03/30/2022    ALKPHOS 74 03/30/2022    AST 42 03/30/2022    ALT 59 03/30/2022       POC Tests: No results for input(s): POCGLU, POCNA, POCK, POCCL, POCBUN, POCHEMO, POCHCT in the last 72 hours. Coags:   Lab Results   Component Value Date    PROTIME 11.3 08/16/2016    INR 0.99 08/16/2016    APTT 36.4 08/16/2016       HCG (If Applicable): No results found for: PREGTESTUR, PREGSERUM, HCG, HCGQUANT     ABGs: No results found for: PHART, PO2ART, BFA5RQI, WSV1AVF, BEART, H9FRKKFY     Type & Screen (If Applicable):  No results found for: LABABO, LABRH    Drug/Infectious Status (If Applicable):  No results found for: HIV, HEPCAB    COVID-19 Screening (If Applicable): No results found for: COVID19        Anesthesia Evaluation    Airway: Mallampati: II  TM distance: >3 FB   Neck ROM: full  Mouth opening: > = 3 FB Dental:          Pulmonary:   (+) sleep apnea:                             Cardiovascular:    (+) hypertension:,         Rhythm: regular  Rate: normal                    Neuro/Psych:   (+) neuromuscular disease:, psychiatric history:            GI/Hepatic/Renal:             Endo/Other:    (+) Diabetes, . Abdominal:             Vascular: Other Findings:             Anesthesia Plan      MAC     ASA 3       Induction: intravenous. Anesthetic plan and risks discussed with patient. Plan discussed with CRNA.                   Pipe Pryor MD   4/29/2022

## 2022-04-29 NOTE — H&P
Pre-operative History and Physical    Patient: Alley Bello  : 1970     History Obtained From:  patient, electronic medical record    HISTORY OF PRESENT ILLNESS:    The patient is a 47 y.o. male who presents for a colonoscopy for screening. Past Medical History:        Diagnosis Date    Abdominal wall hernia 2012    Anemia 4/15/2013    Claustrophobia 4/15/2013    Depression 3/8/2011    Diabetes mellitus, type 2 ([address] 2012    Family history of premature coronary artery disease 2012    Hyperlipidemia 2012    Hypertension 3/8/2011    Insomnia 2012    Left atrial dilation 2012    Left ventricular hypertrophy 2012    Neuropathy     Obesity 3/8/2011    Obstructive sleep apnea 2012    uses Cpap sometimes    Osteoarthritis of subtalar joint 2016    Post traumatic stress disorder 4/15/2013    Right atrial dilation 2012    Right bundle branch block 5/15/2014    Right ventricular dilation 2012     Past Surgical History:        Procedure Laterality Date    CARDIOVASCULAR STRESS TEST  May 28 2014    Non-Imaging Stress Test    CIRCUMCISION  2017    Due to phimosis: Dr. Latisha Andrea Right 2016    ankle subtower fusion with bone graft using c-arm     Medications Prior to Admission:   No current facility-administered medications on file prior to encounter. Current Outpatient Medications on File Prior to Encounter   Medication Sig Dispense Refill    clonazePAM (KLONOPIN) 1 MG tablet TAKE ONE TABLET BY MOUTH THREE TIMES A DAY AS NEEDED FOR ANXIETY 90 tablet 0    blood glucose monitor strips Test three times a day & as needed for symptoms of irregular blood glucose. Dispense sufficient amount for indicated testing frequency plus additional to accommodate PRN testing needs.  100 strip 5    Insulin Pen Needle ([employer] PEN NEEDLES) 31G X 6 MM MISC 1 each by Does not apply route daily 100 each 3    Continuous Blood Gluc Sensor (FREESTYLE JIMBO 2 SENSOR) MISC 1 Device by Does not apply route every 14 days 6 each 5    meloxicam (MOBIC) 15 MG tablet TAKE ONE TABLET BY MOUTH DAILY AS NEEDED FOR PAIN 10 tablet 0    diclofenac sodium (VOLTAREN) 1 % GEL Apply 4 g topically 4 times daily 150 g 1    prazosin (MINIPRESS) 5 MG capsule Take 1 capsule by mouth nightly 30 capsule 3    lisinopril (PRINIVIL;ZESTRIL) 40 MG tablet TAKE ONE TABLET BY MOUTH DAILY 30 tablet 4    tamsulosin (FLOMAX) 0.4 MG capsule TAKE ONE CAPSULE BY MOUTH ONCE NIGHTLY 30 capsule 5    simvastatin (ZOCOR) 20 MG tablet TAKE ONE TABLET BY MOUTH EVERY EVENING 30 tablet 5    ARIPiprazole (ABILIFY) 10 MG tablet TAKE ONE TABLET BY MOUTH DAILY 30 tablet 5    amLODIPine (NORVASC) 5 MG tablet TAKE ONE TABLET BY MOUTH DAILY 30 tablet 5    nystatin (MYCOSTATIN) 654010 UNIT/GM powder Apply 3 times daily to the groin 2 Bottle 2    COCONUT OIL PO Take by mouth      Cinnamon 500 MG CAPS Take by mouth      [employer] OMEGA-3 KRILL OIL PO Take by mouth       Allergies:  Patient has no known allergies. History of allergic reaction to anesthesia:  No    Social History:   TOBACCO:   reports that he quit smoking about 14 years ago. His smoking use included cigarettes. He has a 1.25 pack-year smoking history. He has never used smokeless tobacco.  ETOH:   reports no history of alcohol use. DRUGS:   reports no history of drug use.   Family History:       Problem Relation Age of Onset    Heart Disease Mother     Coronary Art Dis Mother     Heart Attack Mother     Mental Illness Mother        PHYSICAL EXAM:      BP (!) 171/100   Pulse 96   Temp 96.7 °F (35.9 °C) (Temporal)   Resp 16   Ht 5' 11\" (1.803 m)   Wt (!) 385 lb (174.6 kg)   SpO2 96%   BMI 53.70 kg/m²  I        Heart:  No m/r/g +s1/s2 RRR    Lungs:  CTA bilaterally    Abdomen:  Soft, nontender, non distended; +bs    ASA Grade:  ASA 2 - Patient with mild systemic disease with no functional limitations    Mallampati Class:  Class I: Soft palate, uvula, fauces, pillars visible  __________  Class II: Soft palate, uvula, fauces visible  ____x______   Class III: Soft palate, base of uvula visible  __________  Class IV: Hard palate only visible   __________      ASSESSMENT AND PLAN:    1. Patient is a 47 y.o. male here for colonoscopy with deep sedation  2. Procedure options, risks and benefits reviewed with patient. We specifically discussed that risks include, but are not limited to infection, bleeding, perforation, death, and missed lesions. Patient expresses understanding.

## 2022-04-29 NOTE — PROGRESS NOTES
Ambulatory Surgery/Procedure Discharge Note    Vitals:    04/29/22 1440   BP: (!) 170/93   Pulse: 83   Resp: 18   Temp:    SpO2: 96%   b/p meets jaciel score. .. patient is alittle anxious and wants to go home. Patient has seen Dr. Tor Powell    In: 0 [P.O.:240; I.V.:350]  Out: -     Restroom use offered before discharge. Yes    Pain assessment:  level of pain (1-10, 10 severe),  Pain Level: 0        Patient discharged to home/self care.  Patient discharged via wheel chair by transporter to waiting family/S.O.       4/29/2022 1450

## 2022-05-02 ENCOUNTER — HOSPITAL ENCOUNTER (OUTPATIENT)
Dept: CT IMAGING | Age: 52
Discharge: HOME | End: 2022-05-02
Payer: COMMERCIAL

## 2022-05-02 DIAGNOSIS — E78.2 MIXED HYPERLIPIDEMIA: ICD-10-CM

## 2022-05-02 PROCEDURE — 75571 CT HRT W/O DYE W/CA TEST: CPT

## 2022-05-21 DIAGNOSIS — F43.22 ADJUSTMENT DISORDER WITH ANXIETY: ICD-10-CM

## 2022-05-23 RX ORDER — CLONAZEPAM 0.5 MG/1
0.5 TABLET ORAL 2 TIMES DAILY PRN
Qty: 60 TABLET | Refills: 0 | Status: SHIPPED | OUTPATIENT
Start: 2022-05-23 | End: 2022-06-28 | Stop reason: SDUPTHER

## 2022-05-23 NOTE — TELEPHONE ENCOUNTER
Refilled the medication however recommend taking only 1-2 times daily as needed. Reduce doses symptoms are manageable and use it only if needed.

## 2022-05-24 ASSESSMENT — ENCOUNTER SYMPTOMS
VOMITING: 0
ABDOMINAL PAIN: 0
SHORTNESS OF BREATH: 0
CHEST TIGHTNESS: 0
COUGH: 0
BLOOD IN STOOL: 0
EYE DISCHARGE: 0
BACK PAIN: 0
ABDOMINAL DISTENTION: 0
WHEEZING: 0
FACIAL SWELLING: 0
COLOR CHANGE: 0

## 2022-05-24 NOTE — PROGRESS NOTES
Tobacco Use    Smoking status: Former Smoker     Packs/day: 0.25     Years: 5.00     Pack years: 1.25     Types: Cigarettes     Quit date: 2008     Years since quittin.4    Smokeless tobacco: Never Used    Tobacco comment: quit smoking about    Vaping Use    Vaping Use: Never used   Substance Use Topics    Alcohol use: No    Drug use: No       Family History  Family History   Problem Relation Age of Onset    Heart Disease Mother     Coronary Art Dis Mother     Heart Attack Mother     Mental Illness Mother        Allergies   No Known Allergies    Medications:     Home Medications:  Were reviewed and are listed in nursing record. and/or listed below    Prior to Admission medications    Medication Sig Start Date End Date Taking? Authorizing Provider   clonazePAM (KLONOPIN) 0.5 MG tablet Take 1 tablet by mouth 2 times daily as needed for Anxiety for up to 30 days. 22  Nito Gottlieb MD   cloNIDine (CATAPRES) 0.3 MG tablet Take 1 tablet by mouth twice daily 22   Nito Gottlieb MD   triamterene-hydroCHLOROthiazide [facility]) 37.5-25 MG per tablet Take 1 tablet by mouth once daily 22   Nito Gottlieb MD   insulin aspart (NOVOLOG FLEXPEN) 100 UNIT/ML injection pen Inject 15 Units into the skin 3 times daily (before meals) Plus 3 units for every 50 of BG above 150 qac 22   Annabella López MD   insulin glargine [facility]) 100 UNIT/ML injection pen Inject 30 Units into the skin 2 times daily 22   Annabella López MD   furosemide (LASIX) 40 MG tablet Use once a day every 2-3 days prn edema , wt gain >5 lbs in 1 week 22   Nito Gottlieb MD   gabapentin (NEURONTIN) 300 MG capsule Take 1 capsule by mouth nightly for 90 days.  Intended supply: 30 days 22  Nito Gottlieb MD   metFORMIN (GLUCOPHAGE-XR) 500 MG extended release tablet Take 2 tablets by mouth in the morning and at bedtime 22   Annabella López MD   Insulin Pen Needle ([employer] PEN NEEDLES) 31G X 6 MM MISC 1 each by Does not apply route daily 3/22/22   VAMSI Hodges CNP   blood glucose monitor strips Test three times a day & as needed for symptoms of irregular blood glucose. Dispense sufficient amount for indicated testing frequency plus additional to accommodate PRN testing needs.  3/17/22   VAMSI Hodges CNP   Insulin Pen Needle ([employer] PEN NEEDLES) 31G X 6 MM MISC 1 each by Does not apply route daily 3/17/22   VAMSI Hodges CNP   Continuous Blood Gluc Sensor (FREESTYLE JIMBO 2 SENSOR) MISC 1 Device by Does not apply route every 14 days 3/17/22   VAMSI Hodges CNP   meloxicam (MOBIC) 15 MG tablet TAKE ONE TABLET BY MOUTH DAILY AS NEEDED FOR PAIN 3/9/22   Raford Boy Bingcang, DO   diclofenac sodium (VOLTAREN) 1 % GEL Apply 4 g topically 4 times daily 1/31/22   Raford Boy Bingcang, DO   prazosin (MINIPRESS) 5 MG capsule Take 1 capsule by mouth nightly 1/20/22   Raford Boy Bingcang, DO   lisinopril (PRINIVIL;ZESTRIL) 40 MG tablet TAKE ONE TABLET BY MOUTH DAILY 1/3/22   Raford Boy Bingcang, DO   tamsulosin (FLOMAX) 0.4 MG capsule TAKE ONE CAPSULE BY MOUTH ONCE NIGHTLY 11/29/21   Raford Boy Bingcang, DO   simvastatin (ZOCOR) 20 MG tablet TAKE ONE TABLET BY MOUTH EVERY EVENING 11/29/21   Raford Boy Bingcang, DO   ARIPiprazole (ABILIFY) 10 MG tablet TAKE ONE TABLET BY MOUTH DAILY 11/29/21   Raford Boy Bingcang, DO   amLODIPine (NORVASC) 5 MG tablet TAKE ONE TABLET BY MOUTH DAILY 4/27/21   Raford Boy Bingcang, DO   nystatin (MYCOSTATIN) 536582 UNIT/GM powder Apply 3 times daily to the groin 12/2/20   Raford Boy Bingcang, DO   COCONUT OIL PO Take by mouth    Historical Provider, MD   Cinnamon 500 MG CAPS Take by mouth    Historical Provider, MD MALDONADO OMEGA-3 KRILL OIL PO Take by mouth    Historical Provider, MD        Review of Systems   Constitutional: Negative for activity change, appetite change, diaphoresis, fatigue, fever and unexpected weight change. HENT: Negative for congestion, facial swelling, mouth sores and nosebleeds. Eyes: Negative for discharge and visual disturbance. Respiratory: Negative for cough, chest tightness, shortness of breath and wheezing. Cardiovascular: Negative for chest pain, palpitations and leg swelling. Gastrointestinal: Negative for abdominal distention, abdominal pain, blood in stool and vomiting. Endocrine: Negative for cold intolerance, heat intolerance and polyuria. Genitourinary: Negative for difficulty urinating, dysuria, frequency and hematuria. Musculoskeletal: Negative for back pain, joint swelling, myalgias and neck pain. Skin: Negative for color change, pallor and rash. Allergic/Immunologic: Negative for immunocompromised state. Neurological: Negative for dizziness, syncope, weakness, light-headedness, numbness and headaches. Hematological: Negative for adenopathy. Does not bruise/bleed easily. Psychiatric/Behavioral: Negative for behavioral problems, confusion, decreased concentration and suicidal ideas. The patient is not nervous/anxious. There were no vitals filed for this visit. There were no vitals filed for this visit. BP Readings from Last 3 Encounters:   04/29/22 (!) 170/93   04/29/22 (!) 177/101   04/20/22 122/72       Wt Readings from Last 3 Encounters:   04/29/22 (!) 385 lb (174.6 kg)   04/20/22 (!) 372 lb (168.7 kg)   04/08/22 (!) 372 lb 12.8 oz (169.1 kg)       Physical Exam  Constitutional:       General: He is not in acute distress. Appearance: He is well-developed. He is not diaphoretic. HENT:      Head: Normocephalic and atraumatic. Eyes:      Pupils: Pupils are equal, round, and reactive to light. Neck:      Thyroid: No thyromegaly. Vascular: No JVD. Cardiovascular:      Rate and Rhythm: Normal rate and regular rhythm. Chest Wall: PMI is not displaced.       Heart sounds: Normal heart sounds, S1 normal and S2 normal. No murmur heard.  No friction rub. No gallop. Pulmonary:      Effort: Pulmonary effort is normal. No respiratory distress. Breath sounds: Normal breath sounds. No stridor. No wheezing or rales. Chest:      Chest wall: No tenderness. Abdominal:      General: Bowel sounds are normal. There is no distension. Palpations: Abdomen is soft. Tenderness: There is no abdominal tenderness. There is no guarding or rebound. Musculoskeletal:         General: No tenderness. Normal range of motion. Cervical back: Normal range of motion. Lymphadenopathy:      Cervical: No cervical adenopathy. Skin:     General: Skin is warm and dry. Findings: No erythema or rash. Neurological:      Mental Status: He is alert and oriented to person, place, and time. Coordination: Coordination normal.   Psychiatric:         Behavior: Behavior normal.         Thought Content:  Thought content normal.         Judgment: Judgment normal.         Labs:       Lab Results   Component Value Date    WBC 8.6 03/30/2022    HGB 13.2 (L) 03/30/2022    HCT 39.3 (L) 03/30/2022    MCV 86.0 03/30/2022     03/30/2022     Lab Results   Component Value Date     03/30/2022    K 4.4 03/30/2022     03/30/2022    CO2 24 03/30/2022    BUN 18 03/30/2022    CREATININE 0.7 (L) 03/30/2022    GLUCOSE 238 (H) 03/30/2022    CALCIUM 9.8 03/30/2022    PROT 7.4 03/30/2022    LABALBU 4.0 03/30/2022    BILITOT 0.4 03/30/2022    ALKPHOS 74 03/30/2022    AST 42 (H) 03/30/2022    ALT 59 (H) 03/30/2022    LABGLOM >60 03/30/2022    GFRAA >60 03/30/2022    AGRATIO 1.2 03/30/2022    GLOB 2.9 05/05/2021         Lab Results   Component Value Date    CHOL 152 11/30/2021    CHOL 149 05/05/2021    CHOL 176 03/17/2021     Lab Results   Component Value Date    TRIG 84 11/30/2021    TRIG 98 05/05/2021    TRIG 107 03/17/2021     Lab Results   Component Value Date    HDL 58 11/30/2021    HDL 55 05/05/2021    HDL 56 03/17/2021     Lab Results Component Value Date    LDLCALC 77 11/30/2021    LDLCALC 74 05/05/2021    LDLCALC 99 03/17/2021     Lab Results   Component Value Date    LABVLDL 17 11/30/2021    LABVLDL 20 05/05/2021    LABVLDL 21 03/17/2021     No results found for: [facility]    Lab Results   Component Value Date    INR 0.99 08/16/2016    INR 0.98 07/06/2012    PROTIME 11.3 08/16/2016    PROTIME 11.2 07/06/2012       The 10-year ASCVD risk score (Anish Pierce et al., 2013) is: 10.4%    Values used to calculate the score:      Age: 47 years      Sex: Male      Is Non- : No      Diabetic: Yes      Tobacco smoker: No      Systolic Blood Pressure: 160 mmHg      Is BP treated: Yes      HDL Cholesterol: 58 mg/dL      Total Cholesterol: 152 mg/dL      Imaging:       Last ECG (if available, Personally interpreted):  Normal sinus rhythm, no ischemic changes  Last Monitor/Holter (if available):    Last Stress (if available): 8/16/16  Impression       Normal myocardial perfusion scan.        No findings of pharmacologic stress induced reversible ischemia. Last Cath (if available):    Last TTE/AUGIE(if available): 6/13/14  Summary    Left ventricular size appears mildly increased . Mild concentric left    ventricular hypertrophy is present. Systolic function is normal with an    estimated ejection fraction of 55%. No regional wall motion abnormalities    are seen. Trivial aortic regurgitation is present. The left atrium is    moderately dilated. Last CMR  (if available):    Last Coronary Artery Calcium Score: 5/2/22  Impression       1. Calcium score of 0, which implies no identifiable plaque, with a very low, generally less than 5 %, risk of coronary artery disease. Ankle-brachial index:    Carotid ultrasound screening:    Abdominal aortic aneurysm screening:       Assessment / Plan:     Hyperlipidemia  Continue simvastatin.   No side effects    Hypertension  Controlled extremity conditions including amlodipine, lisinopril    Precordial pain  Noncardiac. Had normal calcium score    Episodes of chest discomfort related to anxiety/panic attacks. I had the opportunity to review the clinical symptoms and presentation of Mellisa Torrez. Patient's allergies and medications were reviewed and updated. Patient's past medical, surgical, social and family history were reviewed and updated. Patient's testing including laboratory, ECGs, monitor, imaging (TTE,QUINCY,CMR,cath) were reviewed. All questions and concerns were addressed to the patient/family. Alternatives to my treatment were discussed. The note was completed using EMR. Every effort wasmade to ensure accuracy; however, inadvertent computerized transcription errors may be present. Thank you for allowing me to participate in thecare or [id number] [facility] KY[place] Schwartz MD, [facility], [facility] 69

## 2022-05-25 ENCOUNTER — OFFICE VISIT (OUTPATIENT)
Dept: CARDIOLOGY CLINIC | Age: 52
End: 2022-05-25
Payer: COMMERCIAL

## 2022-05-25 VITALS
SYSTOLIC BLOOD PRESSURE: 110 MMHG | BODY MASS INDEX: 52.3 KG/M2 | DIASTOLIC BLOOD PRESSURE: 62 MMHG | HEART RATE: 90 BPM | WEIGHT: 315 LBS

## 2022-05-25 DIAGNOSIS — R07.2 PRECORDIAL PAIN: ICD-10-CM

## 2022-05-25 DIAGNOSIS — E78.2 MIXED HYPERLIPIDEMIA: Chronic | ICD-10-CM

## 2022-05-25 DIAGNOSIS — I10 PRIMARY HYPERTENSION: Chronic | ICD-10-CM

## 2022-05-25 PROCEDURE — 99214 OFFICE O/P EST MOD 30 MIN: CPT | Performed by: INTERNAL MEDICINE

## 2022-06-08 ENCOUNTER — TELEPHONE (OUTPATIENT)
Dept: ENDOCRINOLOGY | Age: 52
End: 2022-06-08

## 2022-06-08 ENCOUNTER — OFFICE VISIT (OUTPATIENT)
Dept: FAMILY MEDICINE CLINIC | Age: 52
End: 2022-06-08
Payer: COMMERCIAL

## 2022-06-08 VITALS
BODY MASS INDEX: 44.1 KG/M2 | DIASTOLIC BLOOD PRESSURE: 72 MMHG | HEIGHT: 71 IN | WEIGHT: 315 LBS | OXYGEN SATURATION: 97 % | HEART RATE: 83 BPM | SYSTOLIC BLOOD PRESSURE: 122 MMHG | TEMPERATURE: 97.5 F

## 2022-06-08 DIAGNOSIS — F43.10 POST TRAUMATIC STRESS DISORDER: Primary | Chronic | ICD-10-CM

## 2022-06-08 DIAGNOSIS — I10 ESSENTIAL HYPERTENSION: Chronic | ICD-10-CM

## 2022-06-08 DIAGNOSIS — E11.9 TYPE 2 DIABETES MELLITUS WITHOUT COMPLICATION, UNSPECIFIED WHETHER LONG TERM INSULIN USE (HCC): ICD-10-CM

## 2022-06-08 DIAGNOSIS — M54.32 LEFT SIDED SCIATICA: ICD-10-CM

## 2022-06-08 DIAGNOSIS — F33.1 MODERATE EPISODE OF RECURRENT MAJOR DEPRESSIVE DISORDER (HCC): ICD-10-CM

## 2022-06-08 DIAGNOSIS — F41.1 GAD (GENERALIZED ANXIETY DISORDER): ICD-10-CM

## 2022-06-08 DIAGNOSIS — R60.0 LEG EDEMA: ICD-10-CM

## 2022-06-08 DIAGNOSIS — D50.8 IRON DEFICIENCY ANEMIA SECONDARY TO INADEQUATE DIETARY IRON INTAKE: ICD-10-CM

## 2022-06-08 PROCEDURE — 3046F HEMOGLOBIN A1C LEVEL >9.0%: CPT | Performed by: FAMILY MEDICINE

## 2022-06-08 PROCEDURE — 99214 OFFICE O/P EST MOD 30 MIN: CPT | Performed by: FAMILY MEDICINE

## 2022-06-08 RX ORDER — GABAPENTIN 300 MG/1
300 CAPSULE ORAL NIGHTLY
Qty: 30 CAPSULE | Refills: 5 | Status: SHIPPED | OUTPATIENT
Start: 2022-06-08 | End: 2022-08-12 | Stop reason: DRUGHIGH

## 2022-06-08 RX ORDER — TAMSULOSIN HYDROCHLORIDE 0.4 MG/1
CAPSULE ORAL
Qty: 30 CAPSULE | Refills: 5 | Status: SHIPPED | OUTPATIENT
Start: 2022-06-08

## 2022-06-08 RX ORDER — FUROSEMIDE 40 MG/1
TABLET ORAL
Qty: 60 TABLET | Refills: 3 | Status: SHIPPED | OUTPATIENT
Start: 2022-06-08

## 2022-06-08 RX ORDER — GLIPIZIDE 10 MG/1
TABLET, FILM COATED, EXTENDED RELEASE ORAL
COMMUNITY
Start: 2022-04-06 | End: 2022-07-30

## 2022-06-08 RX ORDER — LISINOPRIL 40 MG/1
TABLET ORAL
Qty: 30 TABLET | Refills: 5 | Status: SHIPPED | OUTPATIENT
Start: 2022-06-08

## 2022-06-08 RX ORDER — FERROUS SULFATE 325(65) MG
325 TABLET ORAL
Qty: 30 TABLET | Refills: 5 | Status: SHIPPED | OUTPATIENT
Start: 2022-06-08

## 2022-06-08 RX ORDER — AMLODIPINE BESYLATE 5 MG/1
TABLET ORAL
Qty: 30 TABLET | Refills: 5 | Status: SHIPPED | OUTPATIENT
Start: 2022-06-08

## 2022-06-08 RX ORDER — CLONIDINE HYDROCHLORIDE 0.3 MG/1
TABLET ORAL
Qty: 60 TABLET | Refills: 5 | Status: SHIPPED | OUTPATIENT
Start: 2022-06-08

## 2022-06-08 RX ORDER — PRAZOSIN HYDROCHLORIDE 5 MG/1
5 CAPSULE ORAL NIGHTLY
Qty: 30 CAPSULE | Refills: 5 | Status: SHIPPED | OUTPATIENT
Start: 2022-06-08

## 2022-06-08 RX ORDER — TRIAMTERENE AND HYDROCHLOROTHIAZIDE 37.5; 25 MG/1; MG/1
1 TABLET ORAL DAILY
Qty: 30 TABLET | Refills: 5 | Status: SHIPPED | OUTPATIENT
Start: 2022-06-08

## 2022-06-08 RX ORDER — SIMVASTATIN 20 MG
TABLET ORAL
Qty: 30 TABLET | Refills: 5 | Status: SHIPPED | OUTPATIENT
Start: 2022-06-08

## 2022-06-08 SDOH — ECONOMIC STABILITY: FOOD INSECURITY: WITHIN THE PAST 12 MONTHS, YOU WORRIED THAT YOUR FOOD WOULD RUN OUT BEFORE YOU GOT MONEY TO BUY MORE.: OFTEN TRUE

## 2022-06-08 SDOH — ECONOMIC STABILITY: FOOD INSECURITY: WITHIN THE PAST 12 MONTHS, THE FOOD YOU BOUGHT JUST DIDN'T LAST AND YOU DIDN'T HAVE MONEY TO GET MORE.: OFTEN TRUE

## 2022-06-08 ASSESSMENT — PATIENT HEALTH QUESTIONNAIRE - PHQ9
7. TROUBLE CONCENTRATING ON THINGS, SUCH AS READING THE NEWSPAPER OR WATCHING TELEVISION: 3
SUM OF ALL RESPONSES TO PHQ QUESTIONS 1-9: 13
3. TROUBLE FALLING OR STAYING ASLEEP: 3
4. FEELING TIRED OR HAVING LITTLE ENERGY: 3
6. FEELING BAD ABOUT YOURSELF - OR THAT YOU ARE A FAILURE OR HAVE LET YOURSELF OR YOUR FAMILY DOWN: 1
1. LITTLE INTEREST OR PLEASURE IN DOING THINGS: 0
SUM OF ALL RESPONSES TO PHQ9 QUESTIONS 1 & 2: 0
10. IF YOU CHECKED OFF ANY PROBLEMS, HOW DIFFICULT HAVE THESE PROBLEMS MADE IT FOR YOU TO DO YOUR WORK, TAKE CARE OF THINGS AT HOME, OR GET ALONG WITH OTHER PEOPLE: 1
SUM OF ALL RESPONSES TO PHQ QUESTIONS 1-9: 13
2. FEELING DOWN, DEPRESSED OR HOPELESS: 0
SUM OF ALL RESPONSES TO PHQ QUESTIONS 1-9: 13
8. MOVING OR SPEAKING SO SLOWLY THAT OTHER PEOPLE COULD HAVE NOTICED. OR THE OPPOSITE, BEING SO FIGETY OR RESTLESS THAT YOU HAVE BEEN MOVING AROUND A LOT MORE THAN USUAL: 0
SUM OF ALL RESPONSES TO PHQ QUESTIONS 1-9: 13
5. POOR APPETITE OR OVEREATING: 3
9. THOUGHTS THAT YOU WOULD BE BETTER OFF DEAD, OR OF HURTING YOURSELF: 0

## 2022-06-08 ASSESSMENT — SOCIAL DETERMINANTS OF HEALTH (SDOH): HOW HARD IS IT FOR YOU TO PAY FOR THE VERY BASICS LIKE FOOD, HOUSING, MEDICAL CARE, AND HEATING?: VERY HARD

## 2022-06-08 NOTE — TELEPHONE ENCOUNTER
Spoke w/ wife, they are going to go to [facility] but they are out of novolog/basaglar. They want an alternative. They state they want a change, and they want to discuss Insulin SSI w/ you . There pharmacy is requesting a change to the directions. Humulin R and N is what they suggest as alt.

## 2022-06-08 NOTE — PROGRESS NOTES
Portions of this chart may have been created with voice recognition software. Occasional wrong-word or \"sound-alike\" substitutions may have occurred due to the inherent limitations of voice recognition software. Read the chart carefully and recognize, using context, where these substitutions have occurred        Chief Complaint     Other (discuss medications)               SUBJECTIVE    Patricia Little is a 47 y.o. male here for discussion of medication changes as some of the medications he is unable to afford currently because of financial situation. He is switching his pharmacy to [address] [place] [address] and would like all the prescriptions to be renewed to that pharmacy. 1. Essential hypertension  Continuing on all blood pressure medications. Blood pressures have been stable and normal.  He had followed up with cardiologist also recently and cardiac work-up has been negative so far. Recommended to continue all his blood pressure medications the same. - triamterene-hydroCHLOROthiazide (MAXZIDE-25) 37.5-25 MG per tablet; Take 1 tablet by mouth daily  Dispense: 30 tablet; Refill: 5  - lisinopril (PRINIVIL;ZESTRIL) 40 MG tablet; TAKE ONE TABLET BY MOUTH DAILY  Dispense: 30 tablet; Refill: 5  - cloNIDine (CATAPRES) 0.3 MG tablet; Take 1 tablet by mouth twice daily  Dispense: 60 tablet; Refill: 5  - amLODIPine (NORVASC) 5 MG tablet; TAKE ONE TABLET BY MOUTH DAILY  Dispense: 30 tablet; Refill: 5    2. Type 2 diabetes mellitus without complication, unspecified whether long term insulin use ([address]  Does not following up with endocrinologist for his type 2 diabetes. Last  A1c has improved from the previous 12. The pharmacy recommends changing his Humulin R or Humulin in instead of the NovoLog he is on. They are also suggesting Bydureon. Recommended patient to contact his endocrinologist for making these changes in his medications.   - simvastatin (ZOCOR) 20 MG tablet; TAKE ONE TABLET BY MOUTH EVERY EVENING  Dispense: 30 tablet; Refill: 5    3. Post traumatic stress disorder  Well controlled with the prazosin. - prazosin (MINIPRESS) 5 mg capsule; Take 1 capsule by mouth nightly  Dispense: 30 capsule; Refill: 5    4. RYAN (generalized anxiety disorder)  5. Moderate episode of recurrent major depressive disorder ([facility] [address] [address]  And is on Abilify and clonazepam for anxiety and depression. However unable to afford these medications. Patient wants to keep his clonazepam with the current pharmacy however would like to switch the Abilify of 2 either Zoloft or Lexapro. Expressed understanding to start on Zoloft and the prescription will be sent to the pharmacy risk versus benefits of the medications were discussed with the patient today. Recommended to call back if symptoms were to worsen. - sertraline (ZOLOFT) 50 MG tablet; Take 1 tablet by mouth daily  Dispense: 30 tablet; Refill: 5    6. Iron deficiency anemia secondary to inadequate dietary iron intake  Continues to have low hemoglobin and a low energy levels and the patient would like to be started on iron medications at this time  - ferrous sulfate (IRON 325) 325 (65 Fe) MG tablet; Take 1 tablet by mouth daily (with breakfast)  Dispense: 30 tablet; Refill: 5    7. Left sided sciatica  Also requesting refill of his gabapentin for his sciatica. Continues to benefit from using it. No side effects noted. - gabapentin (NEURONTIN) 300 MG capsule; Take 1 capsule by mouth nightly for 90 days. Intended supply: 30 days  Dispense: 30 capsule; Refill: 5    8. Leg edema  Uses furosemide only as needed for edema. Currently no swelling no symptoms. - furosemide (LASIX) 40 MG tablet; Use once a day every 2-3 days prn edema , wt gain >5 lbs in 1 week  Dispense: 60 tablet;  Refill: 3              REVIEW OF SYSTEMS:  Current symptoms noted in HPI      Lab Results   Component Value Date    WBC 8.6 03/30/2022    HGB 13.2 (L) 03/30/2022    HCT 39.3 (L) 03/30/2022    MCV 86.0 03/30/2022     03/30/2022      Lab Results   Component Value Date    LABA1C 12.8 03/17/2022     Lab Results   Component Value Date    .8 11/30/2021     Lab Results   Component Value Date    TSH 2.68 03/30/2022     Lab Results   Component Value Date    CHOL 152 11/30/2021     Lab Results   Component Value Date    TRIG 84 11/30/2021     Lab Results   Component Value Date    HDL 58 11/30/2021     Lab Results   Component Value Date    LDLCALC 77 11/30/2021     Lab Results   Component Value Date    LABVLDL 17 11/30/2021     No results found for: [facility]   Lab Results   Component Value Date     03/30/2022    K 4.4 03/30/2022     03/30/2022    CO2 24 03/30/2022    BUN 18 03/30/2022    CREATININE 0.7 (L) 03/30/2022    GLUCOSE 238 (H) 03/30/2022    CALCIUM 9.8 03/30/2022    PROT 7.4 03/30/2022    LABALBU 4.0 03/30/2022    BILITOT 0.4 03/30/2022    ALKPHOS 74 03/30/2022    AST 42 (H) 03/30/2022    ALT 59 (H) 03/30/2022    LABGLOM >60 03/30/2022    GFRAA >60 03/30/2022    AGRATIO 1.2 03/30/2022    GLOB 2.9 05/05/2021           Current Outpatient Medications   Medication Sig Dispense Refill    triamterene-hydroCHLOROthiazide (MAXZIDE-25) 37.5-25 MG per tablet Take 1 tablet by mouth daily 30 tablet 5    tamsulosin (FLOMAX) 0.4 mg capsule TAKE ONE CAPSULE BY MOUTH ONCE NIGHTLY 30 capsule 5    simvastatin (ZOCOR) 20 MG tablet TAKE ONE TABLET BY MOUTH EVERY EVENING 30 tablet 5    prazosin (MINIPRESS) 5 mg capsule Take 1 capsule by mouth nightly 30 capsule 5    lisinopril (PRINIVIL;ZESTRIL) 40 MG tablet TAKE ONE TABLET BY MOUTH DAILY 30 tablet 5    furosemide (LASIX) 40 MG tablet Use once a day every 2-3 days prn edema , wt gain >5 lbs in 1 week 60 tablet 3    gabapentin (NEURONTIN) 300 MG capsule Take 1 capsule by mouth nightly for 90 days.  Intended supply: 30 days 30 capsule 5    cloNIDine (CATAPRES) 0.3 MG tablet Take 1 tablet by mouth twice daily 60 tablet 5    amLODIPine (NORVASC) 5 MG tablet TAKE ONE TABLET BY MOUTH DAILY 30 tablet 5    ferrous sulfate (IRON 325) 325 (65 Fe) MG tablet Take 1 tablet by mouth daily (with breakfast) 30 tablet 5    sertraline (ZOLOFT) 50 MG tablet Take 1 tablet by mouth daily 30 tablet 5    clonazePAM (KLONOPIN) 0.5 MG tablet Take 1 tablet by mouth 2 times daily as needed for Anxiety for up to 30 days. 60 tablet 0    insulin aspart (NOVOLOG FLEXPEN) 100 UNIT/ML injection pen Inject 15 Units into the skin 3 times daily (before meals) Plus 3 units for every 50 of BG above 150 qac 10 pen 3    insulin glargine (BASAGLAR KWIKPEN) 100 UNIT/ML injection pen Inject 30 Units into the skin 2 times daily 10 pen 3    metFORMIN (GLUCOPHAGE-XR) 500 MG extended release tablet Take 2 tablets by mouth in the morning and at bedtime 120 tablet 3    Insulin Pen Needle ([employer] PEN NEEDLES) 31G X 6 MM MISC 1 each by Does not apply route daily 100 each 3    Continuous Blood Gluc Sensor (FREESTYLE JIMBO 2 SENSOR) MISC 1 Device by Does not apply route every 14 days 6 each 5    diclofenac sodium (VOLTAREN) 1 % GEL Apply 4 g topically 4 times daily 150 g 1    glipiZIDE (GLUCOTROL XL) 10 MG extended release tablet        No current facility-administered medications for this visit.        No Known Allergies      Past Medical History:   Diagnosis Date    Abdominal wall hernia 2/21/2012    Anemia 4/15/2013    Claustrophobia 4/15/2013    Depression 3/8/2011    Diabetes mellitus, type 2 ([facility] 2/20/2012    Family history of premature coronary artery disease 6/19/2012    Hyperlipidemia 2/27/2012    Hypertension 3/8/2011    Insomnia 2/17/2012    Left atrial dilation 6/19/2012    Left ventricular hypertrophy 6/19/2012    Neuropathy     Obesity 3/8/2011    Obstructive sleep apnea 2/17/2012    uses Cpap sometimes    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    Post traumatic stress disorder 4/15/2013    Right atrial dilation 6/19/2012    Right bundle branch block 5/15/2014    Right ventricular dilation 2012         Past Surgical History:   Procedure Laterality Date    CARDIOVASCULAR STRESS TEST  May 28 2014    Non-Imaging Stress Test    CIRCUMCISION  2017    Due to phimosis: Dr. Eri Mcintyre COLONOSCOPY  2022    COLONOSCOPY POLYPECTOMY SNARE/COLD BIOPSY performed by Charisma Sofia MD at [address] Right 2016    ankle subtower fusion with bone graft using c-arm         Family History   Problem Relation Age of Onset    Heart Disease Mother     Coronary Art Dis Mother     Heart Attack Mother     Mental Illness Mother         Social History     Socioeconomic History    Marital status:      Spouse name: Kenn Torres Number of children: 0    Years of education: None    Highest education level: None   Occupational History    Occupation:    Tobacco Use    Smoking status: Former Smoker     Packs/day: 0.25     Years: 5.00     Pack years: 1.25     Types: Cigarettes     Quit date: 2008     Years since quittin.4    Smokeless tobacco: Never Used    Tobacco comment: quit smoking about    Vaping Use    Vaping Use: Never used   Substance and Sexual Activity    Alcohol use: No    Drug use: No    Sexual activity: Yes     Partners: Female     Comment:  - [address] [place]   Other Topics Concern    None   Social History Narrative    None     Social Determinants of Health     Financial Resource Strain: High Risk    Difficulty of Paying Living Expenses: Very hard   Food Insecurity: Food Insecurity Present    Worried About Running Out of Food in the Last Year: Often true    Sylvia of Food in the Last Year: Often true   Transportation Needs:     Lack of Transportation (Medical): Not on file    Lack of Transportation (Non-Medical):  Not on file   Physical Activity:     Days of Exercise per Week: Not on file    Minutes of Exercise per Session: Not on file   Stress:  Feeling of Stress : Not on file   Social Connections:     Frequency of Communication with Friends and Family: Not on file    Frequency of Social Gatherings with Friends and Family: Not on file    Attends [employer] Services: Not on file    Active Member of Clubs or Organizations: Not on file    Attends Club or Organization Meetings: Not on file    Marital Status: Not on file   Intimate Partner Violence:     Fear of Current or Ex-Partner: Not on file    Emotionally Abused: Not on file    Physically Abused: Not on file    Sexually Abused: Not on file   Housing Stability:     Unable to Pay for Housing in the Last Year: Not on file    Number of [place] in the Last Year: Not on file    Unstable Housing in the Last Year: Not on file          OBJECTIVE:      Vitals:    06/08/22 1133   BP: 122/72   Pulse: 83   Temp: 97.5 °F (36.4 °C)   TempSrc: Temporal   SpO2: 97%   Weight: (!) 373 lb 9.6 oz (169.5 kg)   Height: 5' 11\" (1.803 m)         Constitutional: Patient appears well-nourished, not in any distress. HENT:  Head: Normocephalic and atraumatic. Eyes: Conjunctivae and EOM are normal  Right Ear: External ear normal.  Left Ear: External ear normal.   Nose: Nose normal.  Neurological:Patient is alert, oriented to person, place, and time. No obvious focal neurological deficits  Skin: Skin is warm and moist.  Psychiatric:Patient has a normal mood and affect, behavior is normal. Judgment and thought content normal.    ASSESSMENT AND PLAN    Miriam Corrigan was seen today for other. Diagnoses and all orders for this visit:    Post traumatic stress disorder  -     prazosin (MINIPRESS) 5 mg capsule; Take 1 capsule by mouth nightly    Essential hypertension  -     triamterene-hydroCHLOROthiazide (MAXZIDE-25) 37.5-25 MG per tablet; Take 1 tablet by mouth daily  -     lisinopril (PRINIVIL;ZESTRIL) 40 MG tablet; TAKE ONE TABLET BY MOUTH DAILY  -     cloNIDine (CATAPRES) 0.3 MG tablet;  Take 1 tablet by mouth twice daily  -     amLODIPine (NORVASC) 5 MG tablet; TAKE ONE TABLET BY MOUTH DAILY    Type 2 diabetes mellitus without complication, unspecified whether long term insulin use (HCC)  -     simvastatin (ZOCOR) 20 MG tablet; TAKE ONE TABLET BY MOUTH EVERY EVENING    RYAN (generalized anxiety disorder)  -     sertraline (ZOLOFT) 50 MG tablet; Take 1 tablet by mouth daily    Moderate episode of recurrent major depressive disorder (HCC)  -     sertraline (ZOLOFT) 50 MG tablet; Take 1 tablet by mouth daily    Iron deficiency anemia secondary to inadequate dietary iron intake  -     ferrous sulfate (IRON 325) 325 (65 Fe) MG tablet; Take 1 tablet by mouth daily (with breakfast)    Left sided sciatica  -     gabapentin (NEURONTIN) 300 MG capsule; Take 1 capsule by mouth nightly for 90 days. Intended supply: 30 days    Leg edema  -     furosemide (LASIX) 40 MG tablet; Use once a day every 2-3 days prn edema , wt gain >5 lbs in 1 week    Other orders  -     tamsulosin (FLOMAX) 0.4 mg capsule; TAKE ONE CAPSULE BY MOUTH ONCE NIGHTLY           DISCHARGE MEDICATION LIST        Medication List          Accurate as of June 8, 2022  1:31 PM. If you have any questions, ask your nurse or doctor. START taking these medications    ferrous sulfate 325 (65 Fe) MG tablet  Commonly known as: IRON 325  Take 1 tablet by mouth daily (with breakfast)  Started by: Kimo Quinonez MD     sertraline 50 MG tablet  Commonly known as: ZOLOFT  Take 1 tablet by mouth daily  Started by: Kimo Quinonez MD        CHANGE how you take these medications    triamterene-hydroCHLOROthiazide 37.5-25 MG per tablet  Commonly known as: MAXZIDE-25  Take 1 tablet by mouth daily  What changed: See the new instructions.   Changed by: Kimo Quinonez MD        CONTINUE taking these medications    amLODIPine 5 MG tablet  Commonly known as: NORVASC  TAKE ONE TABLET BY MOUTH DAILY     Basaglar KwikPen 100 UNIT/ML injection pen  Generic drug: insulin glargine  Inject 30 Units into the skin 2 times daily     clonazePAM 0.5 MG tablet  Commonly known as: KLONOPIN  Take 1 tablet by mouth 2 times daily as needed for Anxiety for up to 30 days. cloNIDine 0.3 MG tablet  Commonly known as: CATAPRES  Take 1 tablet by mouth twice daily     diclofenac sodium 1 % Gel  Commonly known as: VOLTAREN  Apply 4 g topically 4 times daily     FreeStyle Brice 2 Sensor Misc  1 Device by Does not apply route every 14 days     furosemide 40 MG tablet  Commonly known as: Lasix  Use once a day every 2-3 days prn edema , wt gain >5 lbs in 1 week     gabapentin 300 MG capsule  Commonly known as: NEURONTIN  Take 1 capsule by mouth nightly for 90 days.  Intended supply: 30 days     glipiZIDE 10 MG extended release tablet  Commonly known as: GLUCOTROL XL     [employer] Pen Needles 31G X 6 MM Misc  Generic drug: Insulin Pen Needle  1 each by Does not apply route daily     lisinopril 40 MG tablet  Commonly known as: PRINIVIL;ZESTRIL  TAKE ONE TABLET BY MOUTH DAILY     metFORMIN 500 mg extended release tablet  Commonly known as: GLUCOPHAGE-XR  Take 2 tablets by mouth in the morning and at bedtime     NovoLOG FlexPen 100 UNIT/ML injection pen  Generic drug: insulin aspart  Inject 15 Units into the skin 3 times daily (before meals) Plus 3 units for every 50 of BG above 150 qac     prazosin 5 mg capsule  Commonly known as: MINIPRESS  Take 1 capsule by mouth nightly     simvastatin 20 MG tablet  Commonly known as: ZOCOR  TAKE ONE TABLET BY MOUTH EVERY EVENING     tamsulosin 0.4 mg capsule  Commonly known as: FLOMAX  TAKE ONE CAPSULE BY MOUTH ONCE NIGHTLY        STOP taking these medications    ARIPiprazole 10 mg tablet  Commonly known as: ABILIFY  Stopped by: Collette Haddock, MD     meloxicam 15 MG tablet  Commonly known as: MOBIC  Stopped by: Collette Haddock, MD           Where to Get Your Medications      These medications were sent to [address] [place] [address] Jantracicarmel Giordano [phone] Reyna Fay [phone] [address]    Phone: [phone]   · amLODIPine 5 MG tablet  · cloNIDine 0.3 MG tablet  · ferrous sulfate 325 (65 Fe) MG tablet  · furosemide 40 MG tablet  · gabapentin 300 MG capsule  · lisinopril 40 MG tablet  · prazosin 5 mg capsule  · sertraline 50 MG tablet  · simvastatin 20 MG tablet  · tamsulosin 0.4 mg capsule  · triamterene-hydroCHLOROthiazide 37.5-25 MG per tablet          Return in about 3 months (around 9/8/2022), or if symptoms worsen or fail to improve. Please refer to diagnosis, orders and patient instructions for further recommendations given. All patient's questions and concerns were addressed appropriately. All orders, handouts were reviewed in detail with the patient and patient voiced understanding verbally. A total of 45 minutes was spent on today's patient encounter.     Time spent includes some or all of the following, both face-to-face time and non face-to-face time, but is not limited to:    [x] Preparing to see the patient and reviewing records  [] Discussion or coordination of care with other health care professionals  [x] Reviewing records or discussing history of plan with colleagues  [x] Obtaining and/or reviewing the history  [] Individual interpretation of results not billed by me  [x] Performing a medically appropriate examination  [x] Counseling patient and/or caregiver  [x] Ordering of unique Tests, Medications, Referrals, or Procedures  [x] Documentation within the EHR

## 2022-06-08 NOTE — TELEPHONE ENCOUNTER
[facility] pharmacy sent prescription to patient's family doctor. Patient's wife requested to revise the directions because he is using more NovoLog insulin before meals, his blood glucose is high and he uses sliding scale routinely. I also informed patient that Humulin N and peaks at lunch and at night, so he might be careful with his prandial insulin injections before lunch. And also should have bedtime snack. Await fax from [facility]. Let me know when we receive it.

## 2022-06-08 NOTE — TELEPHONE ENCOUNTER
Wife is calling about the patients insulin and how he should take it. He is going to change pharmacy to [address] and the pharmacy wants to change the way he takes it.      Please call wife at

## 2022-06-09 ENCOUNTER — TELEPHONE (OUTPATIENT)
Dept: FAMILY MEDICINE CLINIC | Age: 52
End: 2022-06-09

## 2022-06-09 NOTE — TELEPHONE ENCOUNTER
ARAVIND  I spoke with patient's wife and informed about insulin regimen: Discontinue NovoLog and Basaglar insulin. Start Humulin N 30 units twice daily  Start Humulin R 15 units for breakfast, 8 units for lunch, 15 units for dinner, plus sliding scale 3 units for every 50 of blood glucose above 150 before every meal, total daily dose up to 80 units daily. I sent prescriptions to [address] [place] [address] pharmacy. If in 3-4 days blood glucose remains elevated over 200, increase insulin as follows:  Humulin N 35 units twice daily  Humulin R 18 units for breakfast, 10 units for lunch, 18 units for dinner, plus sliding scale 3 units for every 50 of blood glucose above 150 before each meal, total daily dose up to 90 units. Patient will let me know next week blood glucose numbers for further adjustments. Pharmacy suggested [facility], will discuss with patient when blood glucose more stable and when he gets used to the new insulin regimen.     No need to call patient, they will call if problems and with BG log

## 2022-06-09 NOTE — TELEPHONE ENCOUNTER
[address] [facility] called to ask if pt is to discontinue aripiprazole now that he's on sertraline. Pt is taking folic acid and naproxen OTC, but if he can get Rxs for these, he can get them for free. Pt has uncontrolled diabetes A1c 9.8, suggested Nagi MSITH as that's what pharmacy has in stock. Also Humulin N 100 units per mL and Humulin R 100 units per mL. Please call pharmacy and advise.     LOV 06/08/2022

## 2022-06-10 NOTE — TELEPHONE ENCOUNTER
His diabetes is managed by his endocrinologist Dr. Natanael Giraldo. Please direct pharmacy's questions to Dr. Tish George office. Discontinue naproxen and folic acid. Abilify has been discontinued.

## 2022-06-27 ENCOUNTER — CLINICAL DOCUMENTATION (OUTPATIENT)
Dept: PSYCHOLOGY | Age: 52
End: 2022-06-27

## 2022-06-27 DIAGNOSIS — F43.22 ADJUSTMENT DISORDER WITH ANXIETY: ICD-10-CM

## 2022-06-28 DIAGNOSIS — F43.22 ADJUSTMENT DISORDER WITH ANXIETY: ICD-10-CM

## 2022-06-28 RX ORDER — CLONAZEPAM 0.5 MG/1
0.5 TABLET ORAL 2 TIMES DAILY PRN
Qty: 60 TABLET | Refills: 0 | Status: SHIPPED | OUTPATIENT
Start: 2022-06-28 | End: 2022-08-02

## 2022-06-28 RX ORDER — CLONAZEPAM 0.5 MG/1
0.5 TABLET ORAL 2 TIMES DAILY PRN
Qty: 60 TABLET | Refills: 0 | Status: SHIPPED | OUTPATIENT
Start: 2022-06-28 | End: 2022-06-28 | Stop reason: SDUPTHER

## 2022-07-08 ENCOUNTER — TELEPHONE (OUTPATIENT)
Dept: ENDOCRINOLOGY | Age: 52
End: 2022-07-08

## 2022-07-09 NOTE — TELEPHONE ENCOUNTER
Please inform patient that I received correspondence from [address] [place] [address] regarding his Humulin N increase. According to the pharmacy, blood glucose is still elevated above 200. Agree with increase of Humulin N to 42 units twice a day. Please inform patient and ask him to provide us with a blood glucose levels every 1 to 2 weeks for further adjustments.

## 2022-07-11 DIAGNOSIS — R79.89 ELEVATED LIVER FUNCTION TESTS: ICD-10-CM

## 2022-07-11 DIAGNOSIS — E78.2 MIXED HYPERLIPIDEMIA: Chronic | ICD-10-CM

## 2022-07-11 DIAGNOSIS — I10 PRIMARY HYPERTENSION: Chronic | ICD-10-CM

## 2022-07-11 LAB
A/G RATIO: 1.4 (ref 1.1–2.2)
ALBUMIN SERPL-MCNC: 4.2 G/DL (ref 3.4–5)
ALP BLD-CCNC: 72 U/L (ref 40–129)
ALT SERPL-CCNC: 38 U/L (ref 10–40)
ANION GAP SERPL CALCULATED.3IONS-SCNC: 14 MMOL/L (ref 3–16)
AST SERPL-CCNC: 21 U/L (ref 15–37)
BILIRUB SERPL-MCNC: 0.3 MG/DL (ref 0–1)
BUN BLDV-MCNC: 16 MG/DL (ref 7–20)
CALCIUM SERPL-MCNC: 9.5 MG/DL (ref 8.3–10.6)
CHLORIDE BLD-SCNC: 99 MMOL/L (ref 99–110)
CHOLESTEROL, TOTAL: 164 MG/DL (ref 0–199)
CO2: 25 MMOL/L (ref 21–32)
CREAT SERPL-MCNC: 0.6 MG/DL (ref 0.9–1.3)
CREATININE URINE: 137 MG/DL (ref 39–259)
GFR AFRICAN AMERICAN: >60
GFR NON-AFRICAN AMERICAN: >60
GLUCOSE BLD-MCNC: 206 MG/DL (ref 70–99)
HDLC SERPL-MCNC: 61 MG/DL (ref 40–60)
LDL CHOLESTEROL CALCULATED: 87 MG/DL
MICROALBUMIN UR-MCNC: <1.2 MG/DL
MICROALBUMIN/CREAT UR-RTO: NORMAL MG/G (ref 0–30)
POTASSIUM SERPL-SCNC: 4.3 MMOL/L (ref 3.5–5.1)
SODIUM BLD-SCNC: 138 MMOL/L (ref 136–145)
TOTAL PROTEIN: 7.3 G/DL (ref 6.4–8.2)
TRIGL SERPL-MCNC: 80 MG/DL (ref 0–150)
VLDLC SERPL CALC-MCNC: 16 MG/DL

## 2022-07-11 RX ORDER — METFORMIN HYDROCHLORIDE 500 MG/1
TABLET, EXTENDED RELEASE ORAL
Qty: 120 TABLET | Refills: 0 | Status: SHIPPED | OUTPATIENT
Start: 2022-07-11 | End: 2022-08-15

## 2022-07-12 LAB
ESTIMATED AVERAGE GLUCOSE: 214.5 MG/DL
HBA1C MFR BLD: 9.1 %

## 2022-07-13 ENCOUNTER — OFFICE VISIT (OUTPATIENT)
Dept: ENDOCRINOLOGY | Age: 52
End: 2022-07-13
Payer: COMMERCIAL

## 2022-07-13 VITALS
HEIGHT: 71 IN | RESPIRATION RATE: 14 BRPM | WEIGHT: 315 LBS | OXYGEN SATURATION: 97 % | DIASTOLIC BLOOD PRESSURE: 77 MMHG | HEART RATE: 86 BPM | BODY MASS INDEX: 44.1 KG/M2 | SYSTOLIC BLOOD PRESSURE: 117 MMHG | TEMPERATURE: 98 F

## 2022-07-13 DIAGNOSIS — R79.89 ELEVATED LIVER FUNCTION TESTS: ICD-10-CM

## 2022-07-13 DIAGNOSIS — I10 PRIMARY HYPERTENSION: ICD-10-CM

## 2022-07-13 DIAGNOSIS — E66.01 CLASS 3 SEVERE OBESITY WITH SERIOUS COMORBIDITY AND BODY MASS INDEX (BMI) OF 50.0 TO 59.9 IN ADULT, UNSPECIFIED OBESITY TYPE (HCC): ICD-10-CM

## 2022-07-13 DIAGNOSIS — Z82.49 FAMILY HISTORY OF PREMATURE CORONARY ARTERY DISEASE: ICD-10-CM

## 2022-07-13 DIAGNOSIS — E78.2 MIXED HYPERLIPIDEMIA: ICD-10-CM

## 2022-07-13 LAB — C-PEPTIDE: 2.2 NG/ML (ref 1.1–4.4)

## 2022-07-13 PROCEDURE — 95251 CONT GLUC MNTR ANALYSIS I&R: CPT | Performed by: INTERNAL MEDICINE

## 2022-07-13 PROCEDURE — 3046F HEMOGLOBIN A1C LEVEL >9.0%: CPT | Performed by: INTERNAL MEDICINE

## 2022-07-13 PROCEDURE — 99214 OFFICE O/P EST MOD 30 MIN: CPT | Performed by: INTERNAL MEDICINE

## 2022-07-13 NOTE — PROGRESS NOTES
Alena Westfall is a 47 y.o. male who is being evaluated for Type 2 diabetes mellitus. Current symptoms/problems include hyperglycemia and are worsening. Type 2 diabetes mellitus, uncontrolled, with neuropathy (HCC) [E11.40, E11.65]    Diagnosed with Type 2 diabetes mellitus in 2019. Comorbid conditions: HTN, hyperlipidemia and Neuropathy    Current diabetic medications include: Humulin N 42 units bid, 18 units for breakfast, 10 units for lunch, 18 units for dinner, plus sliding scale 3 units for every 50 of blood glucose above 150 before every meal  Just started insulin  No family history of diabetes  Has arrhythmia    Intolerance to diabetes medications: No     Weight trend: stable  Prior visit with dietician: yes  Current diet: on average, 3 meals per day  Current exercise: no, has left leg pain     Current monitoring regimen: home blood tests - 4 times daily  Has brought blood glucose log/meter:  Yes  Home blood sugar records: fasting range: 180-200 and postprandial range: 160-200   Any episodes of hypoglycemia? No  Hypoglycemia frequency and time(s):    Does patient have Glucagon emergency kit? No  Does patient have rapid acting carbohydrate? No  Does patient wear a medic alert bracelet or necklace? No    2. Mixed hyperlipidemia  Has muscle pains, left leg    3. Class 3 severe obesity with serious comorbidity and body mass index (BMI) of 50.0 to 59.9 in adult, unspecified obesity type ([address]  Gained weight, now lost    4. Primary hypertension  Has headaches    5. Elevated liver function tests  No nausea, vomiting    6.  Family history of premature coronary artery disease  Mother  from sudden cardiac death at 48      Past Medical History:   Diagnosis Date    Abdominal wall hernia 2012    Anemia 4/15/2013    Claustrophobia 4/15/2013    Depression 3/8/2011    Diabetes mellitus, type 2 ([address] 2012    Family history of premature coronary artery disease 2012    Hyperlipidemia 2/27/2012    Hypertension 3/8/2011    Insomnia 2/17/2012    Left atrial dilation 6/19/2012    Left ventricular hypertrophy 6/19/2012    Neuropathy     Obesity 3/8/2011    Obstructive sleep apnea 2/17/2012    uses Cpap sometimes    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    Post traumatic stress disorder 4/15/2013    Right atrial dilation 6/19/2012    Right bundle branch block 5/15/2014    Right ventricular dilation 6/19/2012      Patient Active Problem List   Diagnosis    Hypertension    Moderate episode of recurrent major depressive disorder (HCC)    Right leg pain    Eczema    Right lumbar radiculopathy    Urinary urgency    Obstructive sleep apnea    Memory loss    Abdominal mass    Diabetes mellitus, type 2 (HCC)    Abdominal wall hernia    Hyperlipidemia    Chest pain    Left ventricular hypertrophy    Left atrial dilation    Right atrial dilation    Right ventricular dilation    Syncope    Family history of premature coronary artery disease    Post traumatic stress disorder    Claustrophobia    Anemia    Right foot pain    Left foot pain    Right bundle branch block    Right ankle pain    BMI 50.0-59.9, adult ([facility])    Type 2 diabetes mellitus with hemoglobin A1c goal of 7.0%-8.0% ([facility])    Ankle arthritis    Congenital tarsal coalition    Osteoarthritis of subtalar joint    Osteoarthritis of right subtalar joint    Tarsal coalitions    Ankle abrasion with infection    Wound infection after surgery    Type 2 diabetes mellitus without complication ([facility])    Class 3 severe obesity with body mass index (BMI) of 50.0 to 59.9 in adult [facility]    Insomnia due to mental disorder    RYAN (generalized anxiety disorder)    Primary osteoarthritis    Sciatica of left side    Type 2 diabetes mellitus, uncontrolled, with neuropathy ([facility])    Elevated liver function tests    Precordial pain     Past Surgical History:   Procedure Laterality Date    CARDIOVASCULAR STRESS TEST  May 28 2014    Non-Imaging Stress Test    CIRCUMCISION  2017    Due to phimosis: Dr. Rah Haque COLONOSCOPY  2022    COLONOSCOPY POLYPECTOMY SNARE/COLD BIOPSY performed by Shree Bell MD at [id number] [facility] 2016    ankle subtower fusion with bone graft using c-arm     Social History     Socioeconomic History    Marital status:      Spouse name: Hanna Arredondo Number of children: 0    Years of education: Not on file    Highest education level: Not on file   Occupational History    Occupation:    Tobacco Use    Smoking status: Former Smoker     Packs/day: 0.25     Years: 5.00     Pack years: 1.25     Types: Cigarettes     Quit date: 2008     Years since quittin.5    Smokeless tobacco: Never Used    Tobacco comment: quit smoking about    Vaping Use    Vaping Use: Never used   Substance and Sexual Activity    Alcohol use: No    Drug use: No    Sexual activity: Yes     Partners: Female     Comment:  - [address]   Other Topics Concern    Not on file   Social History Narrative    Not on file     Social Determinants of Health     Financial Resource Strain: High Risk    Difficulty of Paying Living Expenses: Very hard   Food Insecurity: Food Insecurity Present    Worried About Running Out of Food in the Last Year: Often true    Sylvia of Food in the Last Year: Often true   Transportation Needs:     Lack of Transportation (Medical): Not on file    Lack of Transportation (Non-Medical):  Not on file   Physical Activity:     Days of Exercise per Week: Not on file    Minutes of Exercise per Session: Not on file   Stress:     Feeling of Stress : Not on file   Social Connections:     Frequency of Communication with Friends and Family: Not on file    Frequency of Social Gatherings with Friends and Family: Not on file    Attends Church Services: Not on file   Eloisa Zafar Member of Clubs or Organizations: Not on file    Attends Club or Organization Meetings: Not on file    Marital Status: Not on file   Intimate Partner Violence:     Fear of Current or Ex-Partner: Not on file    Emotionally Abused: Not on file    Physically Abused: Not on file    Sexually Abused: Not on file   Housing Stability:     Unable to Pay for Housing in the Last Year: Not on file    Number of [place] in the Last Year: Not on file    Unstable Housing in the Last Year: Not on file     Family History   Problem Relation Age of Onset    Heart Disease Mother     Coronary Art Dis Mother     Heart Attack Mother     Mental Illness Mother      Current Outpatient Medications   Medication Sig Dispense Refill    metFORMIN (GLUCOPHAGE-XR) 500 MG extended release tablet TAKE 2 TABLETS BY MOUTH EVERY MORNING & AT BEDTIME 120 tablet 0    insulin NPH (HUMULIN N) 100 UNIT/ML injection vial Inject 42 Units into the skin 2 times daily 30 mL 5    clonazePAM (KLONOPIN) 0.5 MG tablet Take 1 tablet by mouth 2 times daily as needed for Anxiety for up to 30 days.  60 tablet 0    insulin regular (HUMULIN R) 100 UNIT/ML injection 15 units for breakfast, 8 units for lunch, 15 units for dinner, plus sliding scale 3 units for every 50 of blood glucose above 150 before every meal, total daily dose up to 80 units daily 20 mL 5    glipiZIDE (GLUCOTROL XL) 10 MG extended release tablet       triamterene-hydroCHLOROthiazide (MAXZIDE-25) 37.5-25 MG per tablet Take 1 tablet by mouth daily 30 tablet 5    tamsulosin (FLOMAX) 0.4 mg capsule TAKE ONE CAPSULE BY MOUTH ONCE NIGHTLY 30 capsule 5    simvastatin (ZOCOR) 20 MG tablet TAKE ONE TABLET BY MOUTH EVERY EVENING 30 tablet 5    prazosin (MINIPRESS) 5 mg capsule Take 1 capsule by mouth nightly 30 capsule 5    lisinopril (PRINIVIL;ZESTRIL) 40 MG tablet TAKE ONE TABLET BY MOUTH DAILY 30 tablet 5    furosemide (LASIX) 40 MG tablet Use once a day every 2-3 days prn edema , wt gain >5 lbs in 1 week 60 tablet 3    gabapentin (NEURONTIN) 300 MG capsule Take 1 capsule by mouth nightly for 90 days. Intended supply: 30 days 30 capsule 5    cloNIDine (CATAPRES) 0.3 MG tablet Take 1 tablet by mouth twice daily 60 tablet 5    amLODIPine (NORVASC) 5 MG tablet TAKE ONE TABLET BY MOUTH DAILY 30 tablet 5    ferrous sulfate (IRON 325) 325 (65 Fe) MG tablet Take 1 tablet by mouth daily (with breakfast) 30 tablet 5    sertraline (ZOLOFT) 50 MG tablet Take 1 tablet by mouth daily 30 tablet 5    Insulin Pen Needle ([employer] PEN NEEDLES) 31G X 6 MM MISC 1 each by Does not apply route daily 100 each 3    Continuous Blood Gluc Sensor (FREESTYLE JIMBO 2 SENSOR) MISC 1 Device by Does not apply route every 14 days 6 each 5    diclofenac sodium (VOLTAREN) 1 % GEL Apply 4 g topically 4 times daily 150 g 1     No current facility-administered medications for this visit.      No Known Allergies  Family Status   Relation Name Status    Mother   at age 49    Father   at age 68       Lab Review:    Lab Results   Component Value Date/Time    WBC 8.6 2022 04:15 PM    HGB 13.2 2022 04:15 PM    HCT 39.3 2022 04:15 PM    MCV 86.0 2022 04:15 PM     2022 04:15 PM     Lab Results   Component Value Date/Time     2022 07:34 AM    K 4.3 2022 07:34 AM    CL 99 2022 07:34 AM    CO2 25 2022 07:34 AM    BUN 16 2022 07:34 AM    CREATININE 0.6 2022 07:34 AM    GLUCOSE 206 2022 07:34 AM    CALCIUM 9.5 2022 07:34 AM    PROT 7.3 2022 07:34 AM    PROT 6.4 2016 12:00 AM    LABALBU 4.2 2022 07:34 AM    BILITOT 0.3 2022 07:34 AM    ALKPHOS 72 2022 07:34 AM    AST 21 2022 07:34 AM    ALT 38 2022 07:34 AM    LABGLOM >60 2022 07:34 AM    GFRAA >60 2022 07:34 AM    GFRAA >60 2013 10:45 AM    AGRATIO 1.4 2022 07:34 AM    GLOB 2.9 2021 07:50 AM     Lab Results Component Value Date/Time    TSH 2.68 03/30/2022 04:15 PM     Lab Results   Component Value Date/Time    LABA1C 9.1 07/11/2022 07:34 AM     Lab Results   Component Value Date/Time    .5 07/11/2022 07:34 AM     Lab Results   Component Value Date/Time    CHOL 164 07/11/2022 07:34 AM     Lab Results   Component Value Date/Time    TRIG 80 07/11/2022 07:34 AM     Lab Results   Component Value Date/Time    HDL 61 07/11/2022 07:34 AM    HDL 62 02/17/2012 01:52 PM     Lab Results   Component Value Date/Time    LDLCALC 87 07/11/2022 07:34 AM     Lab Results   Component Value Date/Time    LABVLDL 16 07/11/2022 07:34 AM     No results found for: CHOLHDLRATIO  Lab Results   Component Value Date/Time    LABMICR <1.20 07/11/2022 07:39 AM    LABMICR SEE BELOW 08/16/2016 06:36 PM     No results found for: VITD25     Review of Systems:  Constitutional: has fatigue, no fever, no recent weight gain, no recent weight loss, no changes in appetite  Eyes: no eye pain, no change in vision, no eye redness, no eye irritation, no double vision  Ears, nose, throat: no nasal congestion, no sore throat, no earache, no decrease in hearing, no hoarseness, no dry mouth, no sinus problems, no difficulty swallowing, no neck lumps, no dental problems, no mouth sores, no ringing in ears  Pulmonary:  has shortness of breath, no wheezing, has dyspnea on exertion, no cough  Cardiovascular: has chest pain, has lower extremity edema, no orthopnea, no intermittent leg claudication, has palpitations  Gastrointestinal: no abdominal pain, no nausea, no vomiting, no diarrhea, no constipation, no dysphagia, no heartburn, no bloating  Genitourinary: no dysuria, no urinary incontinence, no urinary hesitancy, has urinary frequency, no feelings of urinary urgency, has nocturia  Musculoskeletal: no joint swelling, no joint stiffness, has joint pain, no muscle cramps, no muscle pain  Integument/Breast: has skin rashes, no skin lesions, no itching, no dry skin  Neurological: has numbness, has tingling, no weakness, no confusion, has headaches, has dizziness, no fainting, no tremors, has decrease in memory, has balance problems  Psychiatric: has anxiety, has depression, has insomnia  Hematologic/Lymphatic: no tendency for easy bleeding, no swollen lymph nodes, has tendency for easy bruising  Immunology: no seasonal allergies, no frequent infections, no frequent illnesses  Endocrine: has temperature intolerance    /77   Pulse 86   Temp 98 °F (36.7 °C)   Resp 14   Ht 5' 11\" (1.803 m)   Wt (!) 369 lb (167.4 kg)   SpO2 97%   BMI 51.47 kg/m²    Wt Readings from Last 3 Encounters:   07/13/22 (!) 369 lb (167.4 kg)   06/08/22 (!) 373 lb 9.6 oz (169.5 kg)   05/25/22 (!) 375 lb (170.1 kg)     Body mass index is 51.47 kg/m².       OBJECTIVE:  Constitutional: no acute distress, well appearing and well nourished  Psychiatric: oriented to person, place and time, judgement and insight and normal, recent and remote memory and intact and mood and affect are normal  Skin: skin and subcutaneous tissue is normal without mass, normal turgor  Head and Face: examination of head and face revealed no abnormalities  Eyes: no lid or conjunctival swelling, erythema or discharge, pupils are normal, equal, round, reactive to light  Ears/Nose: external inspection of ears and nose revealed no abnormalities, hearing is grossly normal  Oropharynx/Mouth/Face: lips, tongue and gums are normal with no lesions, the voice quality was normal  Neck: neck is supple and symmetric, with midline trachea and no masses, thyroid is normal  Lymphatics: normal cervical lymph nodes, normal supraclavicular nodes  Pulmonary: no increased work of breathing or signs of respiratory distress, lungs are clear to auscultation  Cardiovascular: normal heart rate and rhythm, normal S1 and S2, no murmurs and pedal pulses and 2+ bilaterally, 1+ edema  Abdomen: abdomen is soft, non-tender with no masses  Musculoskeletal: normal gait and station and exam of the digits and nails are normal  Neurological: normal coordination and normal general cortical function    Visual inspection:  Deformity/amputation: absent  Skin lesions/pre-ulcerative calluses: absent  Edema: right- 1+, left- 1+    Sensory exam:  Monofilament sensation: normal  (minimum of 5 random plantar locations tested, avoiding callused areas - > 1 area with absence of sensation is + for neuropathy)    Plus at least one of the following:  Pulses: normal  Proprioception: Intact  Vibration (128 Hz): Impaired    ASSESSMENT/PLAN:  1. Type 2 diabetes mellitus, uncontrolled, with neuropathy (HCC)  Hemoglobin A1c 15-12 0.4-12.7-12.8-9.1  Counseled patient about pathophysiology of diabetes, basal prandial insulin requirements, insulin adjustments, correction scale insulin. Referred for diabetes education, but patient refused because of financial issues. Continue metformin  mg 2 tablets twice a day  Humulin N 46 units twice a day  Humulin R 18 units for breakfast, 18 units for lunch, 18 units for dinner, plus sliding scale 3 units for every 50 of blood glucose above 150 before every meal  - Hemoglobin A1C; Future  - Comprehensive Metabolic Panel; Future  - Lipid Panel; Future  - Microalbumin / Creatinine Urine Ratio; Future    2. Mixed hyperlipidemia  LDL 87  Continue simvastatin 20 mg daily  - Lipid Panel; Future    3. Class 3 severe obesity with serious comorbidity and body mass index (BMI) of 50.0 to 59.9 in adult, unspecified obesity type (HCC)  Eat healthy, activity as tolerated    4. Primary hypertension  Continue current medications  - Comprehensive Metabolic Panel; Future    5. Elevated liver function tests  -59-38  AST 59-42-21  Follow closely  - Comprehensive Metabolic Panel; Future    6.  Family history of premature coronary artery disease  Mother  from sudden cardiac death at 48  Patient has cardiology consultation scheduled    Reviewed and/or ordered clinical lab results Yes  Reviewed and/or ordered radiology tests Yes  Reviewed and/or ordered other diagnostic tests No  Discussed test results with performing physician No  Independently reviewed image, tracing, or specimen No  Made a decision to obtain old records No  Reviewed and summarized old records Yes   TSH 2.68  ALT 59  AST 42  Hemoglobin A1c 12.8  LDL 77  HDL 58  Triglycerides 84  Obtained history from other than patient No    Gayatri Alaniz was counseled regarding symptoms of diabetes diagnosis, course and complications of disease if inadequately treated, side effects of medications, diagnosis, treatment options, and prognosis, risks, benefits, complications, and alternatives of treatment, labs, imaging and other studies and treatment targets and goals, diabetes pathophysiology, basal and prandial insulin requirements, medications available for diabetes treatment, insulin adjustments, correction scale of insulin. He understands instructions and counseling. These diagnosis were discussed and reviewed with the patient including the advantages of drug therapy. He was counseled at this visit on the following: diabetes complication prevention and foot care. CGMS Download Review and Recommendations  See scanned document for blood glucose tracing documentation  [employer] personal CGMS data downloaded and reviewed. This was separate service provided-CGM interpretation    Average glucose 208 ±28.1 SD  Time in range: 34 %  Time above 180: 68 %  Time under 70: 0  %     Basal pattern review: Basal hyperglycemia  Postprandial pattern review: Postprandial hyperglycemia, especially after lunch  Hypoglycemia review: No hypoglycemia  Activity related review: Not reported      Based on the data, I recommend:    1. Increase NPH insulin    2. Increase Regular insulin for lunch    3. Healthier diet choices, portion control    4.   Do timely bolus injections, do not underestimate carbohydrates       Return in about 3 months (around 10/13/2022) for diabetes.     Electronically signed by Jp Angulo MD on 7/13/2022 at 9:47 AM

## 2022-07-30 ENCOUNTER — HOSPITAL ENCOUNTER (EMERGENCY)
Age: 52
Discharge: HOME | End: 2022-07-30
Attending: STUDENT IN AN ORGANIZED HEALTH CARE EDUCATION/TRAINING PROGRAM
Payer: COMMERCIAL

## 2022-07-30 VITALS
HEIGHT: 71 IN | BODY MASS INDEX: 44.1 KG/M2 | DIASTOLIC BLOOD PRESSURE: 75 MMHG | SYSTOLIC BLOOD PRESSURE: 126 MMHG | TEMPERATURE: 97.9 F | WEIGHT: 315 LBS | HEART RATE: 77 BPM | OXYGEN SATURATION: 97 % | RESPIRATION RATE: 19 BRPM

## 2022-07-30 DIAGNOSIS — M54.41 LOW BACK PAIN DUE TO BILATERAL SCIATICA: ICD-10-CM

## 2022-07-30 DIAGNOSIS — M54.50 ACUTE EXACERBATION OF CHRONIC LOW BACK PAIN: Primary | ICD-10-CM

## 2022-07-30 DIAGNOSIS — M54.42 LOW BACK PAIN DUE TO BILATERAL SCIATICA: ICD-10-CM

## 2022-07-30 DIAGNOSIS — G89.29 ACUTE EXACERBATION OF CHRONIC LOW BACK PAIN: Primary | ICD-10-CM

## 2022-07-30 PROCEDURE — 6360000002 HC RX W HCPCS: Performed by: STUDENT IN AN ORGANIZED HEALTH CARE EDUCATION/TRAINING PROGRAM

## 2022-07-30 PROCEDURE — 99284 EMERGENCY DEPT VISIT MOD MDM: CPT

## 2022-07-30 PROCEDURE — 96372 THER/PROPH/DIAG INJ SC/IM: CPT

## 2022-07-30 PROCEDURE — 6370000000 HC RX 637 (ALT 250 FOR IP): Performed by: STUDENT IN AN ORGANIZED HEALTH CARE EDUCATION/TRAINING PROGRAM

## 2022-07-30 RX ORDER — METHOCARBAMOL 500 MG/1
1000 TABLET, FILM COATED ORAL 3 TIMES DAILY PRN
Qty: 20 TABLET | Refills: 0 | Status: SHIPPED | OUTPATIENT
Start: 2022-07-30 | End: 2022-08-06

## 2022-07-30 RX ORDER — METHOCARBAMOL 500 MG/1
1000 TABLET, FILM COATED ORAL ONCE
Status: COMPLETED | OUTPATIENT
Start: 2022-07-30 | End: 2022-07-30

## 2022-07-30 RX ORDER — KETOROLAC TROMETHAMINE 30 MG/ML
15 INJECTION, SOLUTION INTRAMUSCULAR; INTRAVENOUS ONCE
Status: COMPLETED | OUTPATIENT
Start: 2022-07-30 | End: 2022-07-30

## 2022-07-30 RX ORDER — LIDOCAINE 4 G/G
1 PATCH TOPICAL DAILY
Status: DISCONTINUED | OUTPATIENT
Start: 2022-07-30 | End: 2022-07-30 | Stop reason: HOSPADM

## 2022-07-30 RX ORDER — OXYCODONE HYDROCHLORIDE 5 MG/1
5 TABLET ORAL EVERY 6 HOURS PRN
Qty: 5 TABLET | Refills: 0 | Status: SHIPPED | OUTPATIENT
Start: 2022-07-30 | End: 2022-08-07

## 2022-07-30 RX ADMIN — KETOROLAC TROMETHAMINE 15 MG: 30 INJECTION, SOLUTION INTRAMUSCULAR at 07:39

## 2022-07-30 RX ADMIN — HYDROMORPHONE HYDROCHLORIDE 1 MG: 1 INJECTION, SOLUTION INTRAMUSCULAR; INTRAVENOUS; SUBCUTANEOUS at 09:20

## 2022-07-30 RX ADMIN — METHOCARBAMOL 1000 MG: 500 TABLET ORAL at 07:42

## 2022-07-30 ASSESSMENT — ENCOUNTER SYMPTOMS
NAUSEA: 0
VOMITING: 0
ABDOMINAL PAIN: 0
BACK PAIN: 1
COUGH: 0
CHEST TIGHTNESS: 0

## 2022-07-30 ASSESSMENT — PAIN DESCRIPTION - PAIN TYPE: TYPE: ACUTE PAIN;CHRONIC PAIN

## 2022-07-30 ASSESSMENT — PAIN DESCRIPTION - FREQUENCY: FREQUENCY: INTERMITTENT

## 2022-07-30 ASSESSMENT — PAIN SCALES - GENERAL
PAINLEVEL_OUTOF10: 10

## 2022-07-30 ASSESSMENT — PAIN DESCRIPTION - LOCATION: LOCATION: BACK;BUTTOCKS;LEG

## 2022-07-30 ASSESSMENT — PAIN - FUNCTIONAL ASSESSMENT: PAIN_FUNCTIONAL_ASSESSMENT: 0-10

## 2022-07-30 ASSESSMENT — PAIN DESCRIPTION - ONSET: ONSET: ON-GOING

## 2022-07-30 ASSESSMENT — PAIN DESCRIPTION - ORIENTATION: ORIENTATION: RIGHT;LEFT;ANTERIOR;POSTERIOR

## 2022-07-30 ASSESSMENT — PAIN DESCRIPTION - DESCRIPTORS: DESCRIPTORS: DISCOMFORT;BURNING;STABBING

## 2022-07-30 NOTE — ED PROVIDER NOTES
[id number] [facility]          ATTENDING PHYSICIAN NOTE       Date of evaluation: 7/30/2022    Chief Complaint     Back Pain (Sciatica pain from low back going down both side of buttocks and into legs, hx of sciatica pain, increased last night at 1800, not sleeping well. Hx of seeing PCP and had US studies of lower leg done a month ago )    History of Present Illness     Sejal Sears is a 47 y.o. male who presents with low back and bilateral leg pain x1 month that has progressively worsened. He does have prior history of sciatica and describes this pain is similar. He has a burning pain on both lateral thighs that radiates to the knee as well as midline low back pain. He denies any preceding injury. He denies any numbness or weakness in his legs. He has no bowel or bladder incontinence, no perineal numbness, no fever, no history of IVDU, and no long-term steroid use. He has been taking gabapentin and naproxen at home, but these have not helped his pain. He presents to the emergency department today because he was unable to sleep last night due to pain. He had negative duplex ultrasounds of his legs within the last month after these were ordered by his PCP with concern for DVT. Review of Systems     Review of Systems   Constitutional:  Negative for fatigue and fever. HENT:  Negative for congestion. Eyes:  Negative for visual disturbance. Respiratory:  Negative for cough and chest tightness. Cardiovascular:  Positive for leg swelling. Negative for chest pain and palpitations. Gastrointestinal:  Negative for abdominal pain, nausea and vomiting. Genitourinary:  Negative for decreased urine volume, difficulty urinating, dysuria, flank pain and urgency. Musculoskeletal:  Positive for back pain. Negative for arthralgias, gait problem, myalgias and neck stiffness. Skin:  Negative for pallor and rash. Neurological:  Positive for headaches.  Negative for dizziness, acute distress. Appearance: He is obese. HENT:      Head: Normocephalic and atraumatic. Nose: Nose normal.      Mouth/Throat:      Mouth: Mucous membranes are moist.   Eyes:      Extraocular Movements: Extraocular movements intact. Pupils: Pupils are equal, round, and reactive to light. Cardiovascular:      Rate and Rhythm: Normal rate and regular rhythm. Pulses: Normal pulses. Comments: 2+ DP/PT pulses bilaterally. Pulmonary:      Effort: Pulmonary effort is normal.      Breath sounds: Normal breath sounds. Abdominal:      General: Abdomen is flat. Palpations: Abdomen is soft. Musculoskeletal:         General: No tenderness or deformity. Normal range of motion. Cervical back: Neck supple. Right lower leg: Edema present. Left lower leg: Edema present. Skin:     General: Skin is warm and dry. Neurological:      General: No focal deficit present. Mental Status: He is alert and oriented to person, place, and time. Sensory: No sensory deficit. Motor: No weakness. Psychiatric:         Mood and Affect: Mood normal.         Behavior: Behavior normal.       Diagnostic Results     RADIOLOGY:  No orders to display     LABS:   No results found for this visit on 07/30/22. ED BEDSIDE ULTRASOUND:  No results found. RECENT VITALS:  BP: 126/75,Temp: 97.9 °F (36.6 °C), Heart Rate: 77, Resp: 19, SpO2: 97 %     Procedures     None    ED Course     Nursing Notes, Past Medical Hx, Past Surgical Hx, Social Hx,Allergies, and Family Hx were reviewed. Patient was given the following medications:  Orders Placed This Encounter   Medications    ketorolac (TORADOL) injection 15 mg    methocarbamol (ROBAXIN) tablet 1,000 mg    DISCONTD: lidocaine 4 % external patch 1 patch    HYDROmorphone (DILAUDID) injection 1 mg    oxyCODONE (ROXICODONE) 5 MG immediate release tablet     Sig: Take 1 tablet by mouth every 6 hours as needed for Pain for up to 5 doses. WARNING:  May cause drowsiness. Do not use in combination with alcohol. Dispense:  5 tablet     Refill:  0    methocarbamol (ROBAXIN) 500 MG tablet     Sig: Take 2 tablets by mouth 3 times daily as needed (low back pain)     Dispense:  20 tablet     Refill:  0       CONSULTS:  None    MEDICAL DECISIONMAKING / ASSESSMENT / PLAN     Mina Duran is a 47 y.o. male with past medical history of low back pain and sciatica who presented with an acute exacerbation of his sciatica. He denied precipitating trauma. He had midline low back pain radiating into both lateral thighs, but had no new weakness or numbness, no perineal numbness, no bowel or bladder incontinence, no difficulty voiding, and no fevers. He also denied history of IVDU or any long-term steroid use. On exam, he had 5/5 strength with hip flexion and flexion/extension at his knees and ankles. He had intact sensation to light touch. His presentation was consistent with acute exacerbation of his chronic sciatica. He was treated symptomatically with Toradol, Robaxin, and Dilaudid with significant provement in his pain. He was subsequently able to stand and ambulate independently with a stable gait. He was advised to follow-up with his PCP for further care. He was given discharge prescriptions for short courses of oxycodone and Robaxin; I deferred steroids given his history of difficult-to-control diabetes. He was also given a referral to physical therapy. At this time, patient has been deemed safe for discharge. Discharge instructions, including strict return cautions for new or worsening symptoms, have been communicated. Clinical Impression     1. Acute exacerbation of chronic low back pain    2. Low back pain due to bilateral sciatica        Disposition     PATIENT REFERRED TO:  No follow-up provider specified.     DISCHARGE MEDICATIONS:  Discharge Medication List as of 7/30/2022 10:27 AM        START taking these medications    Details oxyCODONE (ROXICODONE) 5 MG immediate release tablet Take 1 tablet by mouth every 6 hours as needed for Pain for up to 5 doses. WARNING:  May cause drowsiness.   Do not use in combination with alcohol., Disp-5 tablet, R-0Print      methocarbamol (ROBAXIN) 500 MG tablet Take 2 tablets by mouth 3 times daily as needed (low back pain), Disp-20 tablet, R-0Print             DISPOSITION Decision To Discharge 07/30/2022 10:12:14 AM         Sunday MD Edmond  07/30/22 [id number]

## 2022-07-30 NOTE — DISCHARGE INSTRUCTIONS
Seen in the emergency department for low back pain with radiation into your legs. This is most consistent with sciatica. You are being prescribed a short course of a muscle relaxant, Robaxin, and a pain medication, oxycodone. You should take your gabapentin and naproxen first, but can take the additional medications if her pain is not well controlled. Both Robaxin and oxycodone can make you sleepy, and you should not drive while taking them. Oxycodone is also potentially addictive and you should not take more than you need. You are given a referral to physical therapy, which can help with the type of back pain you are experiencing. You should also schedule a follow-up appointment with your PCP.     You should return to the emergency department if:  -You have new or worsening pain  -You are unable to urinate or you lose control of your bowels or bladder  -You have numbness in your genital region  -You have a fever >100.4F  -You have difficulty walking  -You have new numbness or weakness in your legs  -You have any other symptoms you find concerning

## 2022-08-02 ENCOUNTER — OFFICE VISIT (OUTPATIENT)
Dept: FAMILY MEDICINE CLINIC | Age: 52
End: 2022-08-02
Payer: COMMERCIAL

## 2022-08-02 ENCOUNTER — TELEPHONE (OUTPATIENT)
Dept: FAMILY MEDICINE CLINIC | Age: 52
End: 2022-08-02

## 2022-08-02 VITALS
HEART RATE: 108 BPM | OXYGEN SATURATION: 98 % | DIASTOLIC BLOOD PRESSURE: 78 MMHG | SYSTOLIC BLOOD PRESSURE: 134 MMHG | WEIGHT: 315 LBS | HEIGHT: 71 IN | TEMPERATURE: 97.2 F | BODY MASS INDEX: 44.1 KG/M2

## 2022-08-02 DIAGNOSIS — M54.16 ACUTE RADICULAR LOW BACK PAIN: Primary | ICD-10-CM

## 2022-08-02 DIAGNOSIS — F43.22 ADJUSTMENT DISORDER WITH ANXIETY: ICD-10-CM

## 2022-08-02 PROCEDURE — 99213 OFFICE O/P EST LOW 20 MIN: CPT | Performed by: FAMILY MEDICINE

## 2022-08-02 RX ORDER — LIDOCAINE 50 MG/G
1 PATCH TOPICAL DAILY
Qty: 30 PATCH | Refills: 0 | Status: SHIPPED | OUTPATIENT
Start: 2022-08-02 | End: 2022-09-01

## 2022-08-02 RX ORDER — TRAMADOL HYDROCHLORIDE 50 MG/1
50 TABLET ORAL EVERY 6 HOURS PRN
Qty: 28 TABLET | Refills: 0 | Status: SHIPPED | OUTPATIENT
Start: 2022-08-02 | End: 2022-08-09

## 2022-08-02 RX ORDER — PREDNISONE 20 MG/1
40 TABLET ORAL DAILY
Qty: 20 TABLET | Refills: 0 | Status: SHIPPED | OUTPATIENT
Start: 2022-08-02 | End: 2022-08-12

## 2022-08-02 RX ORDER — CLONAZEPAM 0.5 MG/1
TABLET ORAL
Qty: 60 TABLET | Refills: 0 | Status: SHIPPED | OUTPATIENT
Start: 2022-08-02 | End: 2022-09-01 | Stop reason: SDUPTHER

## 2022-08-02 ASSESSMENT — PATIENT HEALTH QUESTIONNAIRE - PHQ9
5. POOR APPETITE OR OVEREATING: 3
SUM OF ALL RESPONSES TO PHQ QUESTIONS 1-9: 19
7. TROUBLE CONCENTRATING ON THINGS, SUCH AS READING THE NEWSPAPER OR WATCHING TELEVISION: 3
3. TROUBLE FALLING OR STAYING ASLEEP: 3
9. THOUGHTS THAT YOU WOULD BE BETTER OFF DEAD, OR OF HURTING YOURSELF: 0
SUM OF ALL RESPONSES TO PHQ QUESTIONS 1-9: 19
8. MOVING OR SPEAKING SO SLOWLY THAT OTHER PEOPLE COULD HAVE NOTICED. OR THE OPPOSITE, BEING SO FIGETY OR RESTLESS THAT YOU HAVE BEEN MOVING AROUND A LOT MORE THAN USUAL: 0
1. LITTLE INTEREST OR PLEASURE IN DOING THINGS: 3
SUM OF ALL RESPONSES TO PHQ QUESTIONS 1-9: 19
10. IF YOU CHECKED OFF ANY PROBLEMS, HOW DIFFICULT HAVE THESE PROBLEMS MADE IT FOR YOU TO DO YOUR WORK, TAKE CARE OF THINGS AT HOME, OR GET ALONG WITH OTHER PEOPLE: 1
6. FEELING BAD ABOUT YOURSELF - OR THAT YOU ARE A FAILURE OR HAVE LET YOURSELF OR YOUR FAMILY DOWN: 1
SUM OF ALL RESPONSES TO PHQ QUESTIONS 1-9: 19
SUM OF ALL RESPONSES TO PHQ9 QUESTIONS 1 & 2: 6
2. FEELING DOWN, DEPRESSED OR HOPELESS: 3
4. FEELING TIRED OR HAVING LITTLE ENERGY: 3

## 2022-08-02 NOTE — TELEPHONE ENCOUNTER
Pt's wife called to request a Rx for a walker. Wife would like to  the Rx. Please call pt's wife when ready.     LOV 08/02/2022

## 2022-08-02 NOTE — PROGRESS NOTES
Chief Complaint   Patient presents with    Follow-up     ED follow-up [facility]     Back Pain       This is a recurrent problem. . The current episode started in the past 7 days. The problem occurs constantly. The  problem has been gradually worsening since onset. The pain is present in the lumbar spine and sacro-iliac. The quality of the pain is described as shooting, stabbing and aching. The pain radiates to the left thigh and  right thigh. The pain is at a severity of 8/10. The pain is moderate. The pain is same all the time. The symptoms are aggravated by bending, twisting and position. Stiffness is present all day. Associated symptoms include leg pain. Pertinent negatives include no abdominal pain, bladder incontinence, bowel incontinence, chest pain,   dysuria, numbness, paresis, paresthesias, pelvic pain, perianal numbness, tingling or weakness. He has tried bed rest for the symptoms. Review of Systems  Cardiovascular: Negative for chest pain. Gastrointestinal: Negative for abdominal pain and bowel incontinence. Genitourinary: Negative for bladder incontinence, dysuria and pelvic pain. Musculoskeletal: Positive for back pain. Neurological: Negative for tingling, weakness, numbness and paresthesias.       Lab Results   Component Value Date    WBC 8.6 03/30/2022    HGB 13.2 (L) 03/30/2022    HCT 39.3 (L) 03/30/2022    MCV 86.0 03/30/2022     03/30/2022      Lab Results   Component Value Date    LABA1C 9.1 07/11/2022     Lab Results   Component Value Date    .5 07/11/2022     Lab Results   Component Value Date    TSH 2.68 03/30/2022     Lab Results   Component Value Date    CHOL 164 07/11/2022     Lab Results   Component Value Date    TRIG 80 07/11/2022     Lab Results   Component Value Date    HDL 61 (H) 07/11/2022     Lab Results   Component Value Date    LDLCALC 87 07/11/2022     Lab Results   Component Value Date    LABVLDL 16 07/11/2022     No results found for: [facility]   Lab Results Component Value Date     07/11/2022    K 4.3 07/11/2022    CL 99 07/11/2022    CO2 25 07/11/2022    BUN 16 07/11/2022    CREATININE 0.6 (L) 07/11/2022    GLUCOSE 206 (H) 07/11/2022    CALCIUM 9.5 07/11/2022    PROT 7.3 07/11/2022    LABALBU 4.2 07/11/2022    BILITOT 0.3 07/11/2022    ALKPHOS 72 07/11/2022    AST 21 07/11/2022    ALT 38 07/11/2022    LABGLOM >60 07/11/2022    GFRAA >60 07/11/2022    AGRATIO 1.4 07/11/2022    GLOB 2.9 05/05/2021           Current Outpatient Medications   Medication Sig Dispense Refill    traMADol (ULTRAM) 50 MG tablet Take 1 tablet by mouth every 6 hours as needed for Pain for up to 7 days. Intended supply: 7 days. Take lowest dose possible to manage pain 28 tablet 0    predniSONE (DELTASONE) 20 MG tablet Take 2 tablets by mouth in the morning for 10 days. 20 tablet 0    lidocaine (LIDODERM) 5 % Place 1 patch onto the skin in the morning. 12 hours on, 12 hours off. . 30 patch 0    oxyCODONE (ROXICODONE) 5 MG immediate release tablet Take 1 tablet by mouth every 6 hours as needed for Pain for up to 5 doses. WARNING:  May cause drowsiness.   Do not use in combination with alcohol. 5 tablet 0    methocarbamol (ROBAXIN) 500 MG tablet Take 2 tablets by mouth 3 times daily as needed (low back pain) 20 tablet 0    metFORMIN (GLUCOPHAGE-XR) 500 MG extended release tablet TAKE 2 TABLETS BY MOUTH EVERY MORNING & AT BEDTIME 120 tablet 0    insulin NPH (HUMULIN N) 100 UNIT/ML injection vial Inject 42 Units into the skin 2 times daily 30 mL 5    insulin regular (HUMULIN R) 100 UNIT/ML injection 15 units for breakfast, 8 units for lunch, 15 units for dinner, plus sliding scale 3 units for every 50 of blood glucose above 150 before every meal, total daily dose up to 80 units daily 20 mL 5    triamterene-hydroCHLOROthiazide (MAXZIDE-25) 37.5-25 MG per tablet Take 1 tablet by mouth daily 30 tablet 5    tamsulosin (FLOMAX) 0.4 mg capsule TAKE ONE CAPSULE BY MOUTH ONCE NIGHTLY 30 capsule 5 simvastatin (ZOCOR) 20 MG tablet TAKE ONE TABLET BY MOUTH EVERY EVENING 30 tablet 5    prazosin (MINIPRESS) 5 mg capsule Take 1 capsule by mouth nightly 30 capsule 5    lisinopril (PRINIVIL;ZESTRIL) 40 MG tablet TAKE ONE TABLET BY MOUTH DAILY 30 tablet 5    furosemide (LASIX) 40 MG tablet Use once a day every 2-3 days prn edema , wt gain >5 lbs in 1 week 60 tablet 3    gabapentin (NEURONTIN) 300 MG capsule Take 1 capsule by mouth nightly for 90 days. Intended supply: 30 days 30 capsule 5    cloNIDine (CATAPRES) 0.3 MG tablet Take 1 tablet by mouth twice daily 60 tablet 5    amLODIPine (NORVASC) 5 MG tablet TAKE ONE TABLET BY MOUTH DAILY 30 tablet 5    ferrous sulfate (IRON 325) 325 (65 Fe) MG tablet Take 1 tablet by mouth daily (with breakfast) 30 tablet 5    sertraline (ZOLOFT) 50 MG tablet Take 1 tablet by mouth daily 30 tablet 5    Insulin Pen Needle ([employer] PEN NEEDLES) 31G X 6 MM MISC 1 each by Does not apply route daily 100 each 3    Continuous Blood Gluc Sensor (FREESTYLE JIMBO 2 SENSOR) MISC 1 Device by Does not apply route every 14 days 6 each 5    diclofenac sodium (VOLTAREN) 1 % GEL Apply 4 g topically 4 times daily 150 g 1    clonazePAM (KLONOPIN) 0.5 MG tablet Take 1 tablet by mouth 2 times daily as needed for Anxiety for up to 30 days. 60 tablet 0     No current facility-administered medications for this visit.        No Known Allergies      Past Medical History:   Diagnosis Date    Abdominal wall hernia 2/21/2012    Anemia 4/15/2013    Claustrophobia 4/15/2013    Depression 3/8/2011    Diabetes mellitus, type 2 ([facility] [address] 2/20/2012    Family history of premature coronary artery disease 6/19/2012    Hyperlipidemia 2/27/2012    Hypertension 3/8/2011    Insomnia 2/17/2012    Left atrial dilation 6/19/2012    Left ventricular hypertrophy 6/19/2012    Neuropathy     Obesity 3/8/2011    Obstructive sleep apnea 2/17/2012    uses Cpap sometimes    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    Post traumatic stress disorder 4/15/2013    Right atrial dilation 2012    Right bundle branch block 5/15/2014    Right ventricular dilation 2012         Past Surgical History:   Procedure Laterality Date    CARDIOVASCULAR STRESS TEST  May 28 2014    Non-Imaging Stress Test    CIRCUMCISION  2017    Due to phimosis: Dr. Luis Ahumada  2022    COLONOSCOPY POLYPECTOMY SNARE/COLD BIOPSY performed by Albert Abbott MD at [address] Right 2016    ankle subtower fusion with bone graft using c-arm         Family History   Problem Relation Age of Onset    Heart Disease Mother     Coronary Art Dis Mother     Heart Attack Mother     Mental Illness Mother         Social History     Socioeconomic History    Marital status:      Spouse name: Sejal Low    Number of children: 0    Years of education: None    Highest education level: None   Occupational History    Occupation:    Tobacco Use    Smoking status: Former     Packs/day: 0.25     Years: 5.00     Pack years: 1.25     Types: Cigarettes     Quit date: 2008     Years since quittin.5    Smokeless tobacco: Never    Tobacco comments:     quit smoking about    Vaping Use    Vaping Use: Never used   Substance and Sexual Activity    Alcohol use: No    Drug use: No    Sexual activity: Yes     Partners: Female     Comment:  - [facility]     Social Determinants of Health     Financial Resource Strain: High Risk    Difficulty of Paying Living Expenses: Very hard   Food Insecurity: Food Insecurity Present    Worried About [facility] in the Last Year: Often true    Ran Out of Food in the Last Year: Often true                OBJECTIVE:    Vitals:    22 1003   BP: 134/78   Pulse: (!) 108   Temp: 97.2 °F (36.2 °C)   SpO2: 98%       Constitutional: Patient is oriented to person, place, and time, appears well-developed and well-nourished.  HENT:  Head: Normocephalic and atraumatic. Right Ear: External ear normal.   Left Ear: External ear normal.   Nose: Nose normal.  Mouth/Throat: Oropharynx is clear and moist.  Eyes: Conjunctivae and EOM are normal. Pupils are equal, round, and reactive to light. Neck: Normal range of motion. Neck supple. Cardiovascular: Normal rate, regular rhythm, normal heart sounds and intact distal pulses. Pulmonary/Chest: Effort normal and breath sounds normal.  Abdominal: Soft. Bowel sounds are normal.  Musculoskeletal:  Lumbar back:He exhibits decreased range of motion, tenderness, bony tenderness and spasm. Neurological: Alert and oriented to person, place, and time. No obvious focal neurological deficits. Antalgic gait noted. Skin: Skin is warm and dry. Psychiatric: Patient has a normal mood and affect, behavior is normal. Judgment and thought content normal.        ASSESSMENT AND PLAN    Matt Osei was seen today for follow-up. Diagnoses and all orders for this visit:    Acute radicular low back pain  -     traMADol (ULTRAM) 50 MG tablet; Take 1 tablet by mouth every 6 hours as needed for Pain for up to 7 days. Intended supply: 7 days. Take lowest dose possible to manage pain    Other orders  -     predniSONE (DELTASONE) 20 MG tablet; Take 2 tablets by mouth in the morning for 10 days. -     lidocaine (LIDODERM) 5 %; Place 1 patch onto the skin in the morning. 12 hours on, 12 hours off. [facility] DISCHARGE MEDICATION LIST        Medication List            Accurate as of August 2, 2022 10:20 AM. If you have any questions, ask your nurse or doctor. START taking these medications      lidocaine 5 %  Commonly known as: LIDODERM  Place 1 patch onto the skin in the morning. 12 hours on, 12 hours off. .  Started by: Rhea Jacobo MD     predniSONE 20 MG tablet  Commonly known as: DELTASONE  Take 2 tablets by mouth in the morning for 10 days.   Started by: Rhea Jacobo MD     traMADol 50 MG tablet  Commonly known as: Ultram  Take 1 tablet by mouth every 6 hours as needed for Pain for up to 7 days. Intended supply: 7 days. Take lowest dose possible to manage pain  Started by: Rosendo Hernández MD            CONTINUE taking these medications      amLODIPine 5 MG tablet  Commonly known as: NORVASC  TAKE ONE TABLET BY MOUTH DAILY     clonazePAM 0.5 MG tablet  Commonly known as: KLONOPIN  Take 1 tablet by mouth 2 times daily as needed for Anxiety for up to 30 days. cloNIDine 0.3 MG tablet  Commonly known as: CATAPRES  Take 1 tablet by mouth twice daily     diclofenac sodium 1 % Gel  Commonly known as: VOLTAREN  Apply 4 g topically 4 times daily     ferrous sulfate 325 (65 Fe) MG tablet  Commonly known as: IRON 325  Take 1 tablet by mouth daily (with breakfast)     FreeStyle Brice 2 Sensor Misc  1 Device by Does not apply route every 14 days     furosemide 40 MG tablet  Commonly known as: Lasix  Use once a day every 2-3 days prn edema , wt gain >5 lbs in 1 week     gabapentin 300 MG capsule  Commonly known as: NEURONTIN  Take 1 capsule by mouth nightly for 90 days.  Intended supply: 30 days     insulin  UNIT/ML injection vial  Commonly known as: HumuLIN N  Inject 42 Units into the skin 2 times daily     insulin regular 100 UNIT/ML injection  Commonly known as: HumuLIN R  15 units for breakfast, 8 units for lunch, 15 units for dinner, plus sliding scale 3 units for every 50 of blood glucose above 150 before every meal, total daily dose up to 80 units daily     [employer] Pen Needles 31G X 6 MM Misc  Generic drug: Insulin Pen Needle  1 each by Does not apply route daily     lisinopril 40 MG tablet  Commonly known as: PRINIVIL;ZESTRIL  TAKE ONE TABLET BY MOUTH DAILY     metFORMIN 500 MG extended release tablet  Commonly known as: GLUCOPHAGE-XR  TAKE 2 TABLETS BY MOUTH EVERY MORNING & AT BEDTIME     methocarbamol 500 MG tablet  Commonly known as: Robaxin  Take 2 tablets by mouth 3 times daily as needed (low back pain)     oxyCODONE 5 MG immediate release tablet  Commonly known as: ROXICODONE  Take 1 tablet by mouth every 6 hours as needed for Pain for up to 5 doses. WARNING:  May cause drowsiness. Do not use in combination with alcohol.     prazosin 5 MG capsule  Commonly known as: MINIPRESS  Take 1 capsule by mouth nightly     sertraline 50 MG tablet  Commonly known as: ZOLOFT  Take 1 tablet by mouth daily     simvastatin 20 MG tablet  Commonly known as: ZOCOR  TAKE ONE TABLET BY MOUTH EVERY EVENING     tamsulosin 0.4 MG capsule  Commonly known as: FLOMAX  TAKE ONE CAPSULE BY MOUTH ONCE NIGHTLY     triamterene-hydroCHLOROthiazide 37.5-25 MG per tablet  Commonly known as: MAXZIDE-25  Take 1 tablet by mouth daily               Where to Get Your Medications        [facility] [id number] [address] Leigh[place]am Ginger [phone]  [address] [facility] [address]      Phone: [phone]   lidocaine 5 %  predniSONE 20 MG tablet  traMADol 50 MG tablet          Return in about 10 days (around 8/12/2022), or if symptoms worsen or fail to improve. Please refer to diagnosis, orders and patient instructions for further recommendations given. All patient's questions and concerns were addressed appropriately. All orders, handouts were reviewed in detail with the patient and patient voiced understanding verbally.

## 2022-08-03 ENCOUNTER — TELEPHONE (OUTPATIENT)
Dept: ADMINISTRATIVE | Age: 52
End: 2022-08-03

## 2022-08-04 NOTE — TELEPHONE ENCOUNTER
Detailed message left for patient - lidocaine 5% patches were denied by his insurance. Recommend using OTC lidocaine 4% patches.

## 2022-08-12 ENCOUNTER — OFFICE VISIT (OUTPATIENT)
Dept: FAMILY MEDICINE CLINIC | Age: 52
End: 2022-08-12
Payer: COMMERCIAL

## 2022-08-12 VITALS
WEIGHT: 315 LBS | HEIGHT: 71 IN | SYSTOLIC BLOOD PRESSURE: 136 MMHG | DIASTOLIC BLOOD PRESSURE: 70 MMHG | HEART RATE: 76 BPM | TEMPERATURE: 97.2 F | OXYGEN SATURATION: 97 % | BODY MASS INDEX: 44.1 KG/M2

## 2022-08-12 DIAGNOSIS — M54.16 ACUTE RADICULAR LOW BACK PAIN: Primary | ICD-10-CM

## 2022-08-12 DIAGNOSIS — M54.32 LEFT SIDED SCIATICA: ICD-10-CM

## 2022-08-12 PROCEDURE — 99213 OFFICE O/P EST LOW 20 MIN: CPT | Performed by: FAMILY MEDICINE

## 2022-08-12 RX ORDER — GABAPENTIN 300 MG/1
300 CAPSULE ORAL 2 TIMES DAILY
Qty: 30 CAPSULE | Refills: 5 | Status: SHIPPED
Start: 2022-08-12 | End: 2022-08-30 | Stop reason: SDUPTHER

## 2022-08-12 ASSESSMENT — PATIENT HEALTH QUESTIONNAIRE - PHQ9
SUM OF ALL RESPONSES TO PHQ QUESTIONS 1-9: 22
SUM OF ALL RESPONSES TO PHQ QUESTIONS 1-9: 22
SUM OF ALL RESPONSES TO PHQ9 QUESTIONS 1 & 2: 6
7. TROUBLE CONCENTRATING ON THINGS, SUCH AS READING THE NEWSPAPER OR WATCHING TELEVISION: 3
9. THOUGHTS THAT YOU WOULD BE BETTER OFF DEAD, OR OF HURTING YOURSELF: 0
4. FEELING TIRED OR HAVING LITTLE ENERGY: 3
3. TROUBLE FALLING OR STAYING ASLEEP: 3
1. LITTLE INTEREST OR PLEASURE IN DOING THINGS: 3
2. FEELING DOWN, DEPRESSED OR HOPELESS: 3
6. FEELING BAD ABOUT YOURSELF - OR THAT YOU ARE A FAILURE OR HAVE LET YOURSELF OR YOUR FAMILY DOWN: 1
SUM OF ALL RESPONSES TO PHQ QUESTIONS 1-9: 22
5. POOR APPETITE OR OVEREATING: 3
10. IF YOU CHECKED OFF ANY PROBLEMS, HOW DIFFICULT HAVE THESE PROBLEMS MADE IT FOR YOU TO DO YOUR WORK, TAKE CARE OF THINGS AT HOME, OR GET ALONG WITH OTHER PEOPLE: 2
8. MOVING OR SPEAKING SO SLOWLY THAT OTHER PEOPLE COULD HAVE NOTICED. OR THE OPPOSITE, BEING SO FIGETY OR RESTLESS THAT YOU HAVE BEEN MOVING AROUND A LOT MORE THAN USUAL: 3
SUM OF ALL RESPONSES TO PHQ QUESTIONS 1-9: 22

## 2022-08-12 ASSESSMENT — COLUMBIA-SUICIDE SEVERITY RATING SCALE - C-SSRS
2. HAVE YOU ACTUALLY HAD ANY THOUGHTS OF KILLING YOURSELF?: NO
1. WITHIN THE PAST MONTH, HAVE YOU WISHED YOU WERE DEAD OR WISHED YOU COULD GO TO SLEEP AND NOT WAKE UP?: NO
6. HAVE YOU EVER DONE ANYTHING, STARTED TO DO ANYTHING, OR PREPARED TO DO ANYTHING TO END YOUR LIFE?: NO

## 2022-08-12 NOTE — PROGRESS NOTES
Chief Complaint   Patient presents with    Follow-up     10 day     Back Pain       Patient is here for follow-up of his back pain. He states that he is much better than what he was. He states that his back is not as bad symptoms before. His the pain radiating to his left leg is also better. Rated his steroid dose today. His blood sugars have been running high because of the prednisone however he has been watching her diet and managing it with the short acting insulin. No other worsening symptoms at this time. We will refer patient to physical therapy for further evaluation and management. Pertinent negatives include no abdominal pain, bladder incontinence, bowel incontinence, chest pain,   dysuria, numbness, paresis, paresthesias, pelvic pain, perianal numbness, tingling or weakness. Review of Systems  Cardiovascular: Negative for chest pain. Gastrointestinal: Negative for abdominal pain and bowel incontinence. Genitourinary: Negative for bladder incontinence, dysuria and pelvic pain. Musculoskeletal: Positive for back pain. Neurological: Negative for tingling, weakness, numbness and paresthesias.       Lab Results   Component Value Date    WBC 8.6 03/30/2022    HGB 13.2 (L) 03/30/2022    HCT 39.3 (L) 03/30/2022    MCV 86.0 03/30/2022     03/30/2022      Lab Results   Component Value Date    LABA1C 9.1 07/11/2022     Lab Results   Component Value Date    .5 07/11/2022     Lab Results   Component Value Date    TSH 2.68 03/30/2022     Lab Results   Component Value Date    CHOL 164 07/11/2022     Lab Results   Component Value Date    TRIG 80 07/11/2022     Lab Results   Component Value Date    HDL 61 (H) 07/11/2022     Lab Results   Component Value Date    LDLCALC 87 07/11/2022     Lab Results   Component Value Date    LABVLDL 16 07/11/2022     No results found for: [facility]   Lab Results   Component Value Date     07/11/2022    K 4.3 07/11/2022    CL 99 07/11/2022    CO2 25 07/11/2022    BUN 16 07/11/2022    CREATININE 0.6 (L) 07/11/2022    GLUCOSE 206 (H) 07/11/2022    CALCIUM 9.5 07/11/2022    PROT 7.3 07/11/2022    LABALBU 4.2 07/11/2022    BILITOT 0.3 07/11/2022    ALKPHOS 72 07/11/2022    AST 21 07/11/2022    ALT 38 07/11/2022    LABGLOM >60 07/11/2022    GFRAA >60 07/11/2022    AGRATIO 1.4 07/11/2022    GLOB 2.9 05/05/2021           Current Outpatient Medications   Medication Sig Dispense Refill    predniSONE (DELTASONE) 20 MG tablet Take 2 tablets by mouth in the morning for 10 days. 20 tablet 0    lidocaine (LIDODERM) 5 % Place 1 patch onto the skin in the morning. 12 hours on, 12 hours off. . 30 patch 0    clonazePAM (KLONOPIN) 0.5 MG tablet TAKE ONE TABLET BY MOUTH TWICE A DAY AS NEEDED FOR ANXIETY FOR UP TO 30 DAYS 60 tablet 0    metFORMIN (GLUCOPHAGE-XR) 500 MG extended release tablet TAKE 2 TABLETS BY MOUTH EVERY MORNING & AT BEDTIME 120 tablet 0    insulin NPH (HUMULIN N) 100 UNIT/ML injection vial Inject 42 Units into the skin 2 times daily 30 mL 5    insulin regular (HUMULIN R) 100 UNIT/ML injection 15 units for breakfast, 8 units for lunch, 15 units for dinner, plus sliding scale 3 units for every 50 of blood glucose above 150 before every meal, total daily dose up to 80 units daily 20 mL 5    triamterene-hydroCHLOROthiazide (MAXZIDE-25) 37.5-25 MG per tablet Take 1 tablet by mouth daily 30 tablet 5    tamsulosin (FLOMAX) 0.4 mg capsule TAKE ONE CAPSULE BY MOUTH ONCE NIGHTLY 30 capsule 5    simvastatin (ZOCOR) 20 MG tablet TAKE ONE TABLET BY MOUTH EVERY EVENING 30 tablet 5    prazosin (MINIPRESS) 5 mg capsule Take 1 capsule by mouth nightly 30 capsule 5    lisinopril (PRINIVIL;ZESTRIL) 40 MG tablet TAKE ONE TABLET BY MOUTH DAILY 30 tablet 5    furosemide (LASIX) 40 MG tablet Use once a day every 2-3 days prn edema , wt gain >5 lbs in 1 week 60 tablet 3    gabapentin (NEURONTIN) 300 MG capsule Take 1 capsule by mouth nightly for 90 days.  Intended supply: 30 days 30 capsule 5    cloNIDine (CATAPRES) 0.3 MG tablet Take 1 tablet by mouth twice daily 60 tablet 5    amLODIPine (NORVASC) 5 MG tablet TAKE ONE TABLET BY MOUTH DAILY 30 tablet 5    ferrous sulfate (IRON 325) 325 (65 Fe) MG tablet Take 1 tablet by mouth daily (with breakfast) 30 tablet 5    sertraline (ZOLOFT) 50 MG tablet Take 1 tablet by mouth daily 30 tablet 5    Insulin Pen Needle ([employer] PEN NEEDLES) 31G X 6 MM MISC 1 each by Does not apply route daily 100 each 3    Continuous Blood Gluc Sensor (FREESTYLE JIMBO 2 SENSOR) MISC 1 Device by Does not apply route every 14 days 6 each 5    diclofenac sodium (VOLTAREN) 1 % GEL Apply 4 g topically 4 times daily 150 g 1     No current facility-administered medications for this visit.        No Known Allergies      Past Medical History:   Diagnosis Date    Abdominal wall hernia 2/21/2012    Anemia 4/15/2013    Claustrophobia 4/15/2013    Depression 3/8/2011    Diabetes mellitus, type 2 ([facility] [address] 2/20/2012    Family history of premature coronary artery disease 6/19/2012    Hyperlipidemia 2/27/2012    Hypertension 3/8/2011    Insomnia 2/17/2012    Left atrial dilation 6/19/2012    Left ventricular hypertrophy 6/19/2012    Neuropathy     Obesity 3/8/2011    Obstructive sleep apnea 2/17/2012    uses Cpap sometimes    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    Post traumatic stress disorder 4/15/2013    Right atrial dilation 6/19/2012    Right bundle branch block 5/15/2014    Right ventricular dilation 6/19/2012         Past Surgical History:   Procedure Laterality Date    CARDIOVASCULAR STRESS TEST  May 28 2014    Non-Imaging Stress Test    CIRCUMCISION  03/2017    Due to phimosis: Dr. Phillip Aguilar  4/29/2022    COLONOSCOPY POLYPECTOMY SNARE/COLD BIOPSY performed by Lynn Peck MD at [address] Right 2/1/2016    ankle subtower fusion with bone graft using c-arm         Family History   Problem Relation Age of Onset    Heart Disease Mother     Coronary Art Dis Mother     Heart Attack Mother     Mental Illness Mother         Social History     Socioeconomic History    Marital status:      Spouse name: Brandon Mortensen    Number of children: 0    Years of education: None    Highest education level: None   Occupational History    Occupation:    Tobacco Use    Smoking status: Former     Packs/day: 0.25     Years: 5.00     Pack years: 1.25     Types: Cigarettes     Quit date: 2008     Years since quittin.6    Smokeless tobacco: Never    Tobacco comments:     quit smoking about    Vaping Use    Vaping Use: Never used   Substance and Sexual Activity    Alcohol use: No    Drug use: No    Sexual activity: Yes     Partners: Female     Comment:  - [address] Determinants of Health     Financial Resource Strain: High Risk    Difficulty of Paying Living Expenses: Very hard   Food Insecurity: Food Insecurity Present    Worried About [address] in the Last Year: Often true    Ran Out of Food in the Last Year: Often true                OBJECTIVE:    Vitals:    22 1047   BP: 136/70   Pulse: 76   Temp: 97.2 °F (36.2 °C)   SpO2: 97%       Constitutional: Patient is oriented to person, place, and time, appears well-developed and well-nourished. HENT:  Head: Normocephalic and atraumatic. Right Ear: External ear normal.   Left Ear: External ear normal.   Nose: Nose normal.  Mouth/Throat: Oropharynx is clear and moist.  Eyes: Conjunctivae and EOM are normal. Pupils are equal, round, and reactive to light. Neck: Normal range of motion. Neck supple. Cardiovascular: Normal rate, regular rhythm, normal heart sounds and intact distal pulses. Pulmonary/Chest: Effort normal and breath sounds normal.  Abdominal: Soft. Bowel sounds are normal.  Musculoskeletal:  Lumbar back:He exhibits some decreased range of motion, tenderness, bony tenderness and spasm.    Neurological: Alert and oriented to person, place, and time. No obvious focal neurological deficits. Antalgic gait noted. Skin: Skin is warm and dry. Psychiatric: Patient has a normal mood and affect, behavior is normal. Judgment and thought content normal.      Hong Hays was seen today for follow-up. Diagnoses and all orders for this visit:    Acute radicular low back pain  -     [facility]    Left sided sciatica  -     gabapentin (NEURONTIN) 300 MG capsule; Take 1 capsule by mouth in the morning and at bedtime for 90 days. Intended supply: 30 days         Medication List            Accurate as of August 12, 2022 11:03 AM. If you have any questions, ask your nurse or doctor. CHANGE how you take these medications      gabapentin 300 MG capsule  Commonly known as: NEURONTIN  Take 1 capsule by mouth in the morning and at bedtime for 90 days.  Intended supply: 30 days  What changed: when to take this  Changed by: Svitlana Montes MD            CONTINUE taking these medications      amLODIPine 5 MG tablet  Commonly known as: NORVASC  TAKE ONE TABLET BY MOUTH DAILY     clonazePAM 0.5 MG tablet  Commonly known as: KLONOPIN  TAKE ONE TABLET BY MOUTH TWICE A DAY AS NEEDED FOR ANXIETY FOR UP TO 30 DAYS     cloNIDine 0.3 MG tablet  Commonly known as: CATAPRES  Take 1 tablet by mouth twice daily     diclofenac sodium 1 % Gel  Commonly known as: VOLTAREN  Apply 4 g topically 4 times daily     ferrous sulfate 325 (65 Fe) MG tablet  Commonly known as: IRON 325  Take 1 tablet by mouth daily (with breakfast)     FreeStyle Brice 2 Sensor Misc  1 Device by Does not apply route every 14 days     furosemide 40 MG tablet  Commonly known as: Lasix  Use once a day every 2-3 days prn edema , wt gain >5 lbs in 1 week     insulin  UNIT/ML injection vial  Commonly known as: HumuLIN N  Inject 42 Units into the skin 2 times daily     insulin regular 100 UNIT/ML injection  Commonly known as: HumuLIN R  15 units for breakfast, 8 units for lunch, 15 units for dinner, plus sliding scale 3 units for every 50 of blood glucose above 150 before every meal, total daily dose up to 80 units daily     [employer] Pen Needles 31G X 6 MM Misc  Generic drug: Insulin Pen Needle  1 each by Does not apply route daily     lidocaine 5 %  Commonly known as: LIDODERM  Place 1 patch onto the skin in the morning. 12 hours on, 12 hours off. .     lisinopril 40 MG tablet  Commonly known as: PRINIVIL;ZESTRIL  TAKE ONE TABLET BY MOUTH DAILY     metFORMIN 500 MG extended release tablet  Commonly known as: GLUCOPHAGE-XR  TAKE 2 TABLETS BY MOUTH EVERY MORNING & AT BEDTIME     prazosin 5 MG capsule  Commonly known as: MINIPRESS  Take 1 capsule by mouth nightly     predniSONE 20 MG tablet  Commonly known as: DELTASONE  Take 2 tablets by mouth in the morning for 10 days. sertraline 50 MG tablet  Commonly known as: ZOLOFT  Take 1 tablet by mouth daily     simvastatin 20 MG tablet  Commonly known as: ZOCOR  TAKE ONE TABLET BY MOUTH EVERY EVENING     tamsulosin 0.4 MG capsule  Commonly known as: FLOMAX  TAKE ONE CAPSULE BY MOUTH ONCE NIGHTLY     triamterene-hydroCHLOROthiazide 37.5-25 MG per tablet  Commonly known as: MAXZIDE-25  Take 1 tablet by mouth daily               Where to Get Your Medications        Information about where to get these medications is not yet available    Ask your nurse or doctor about these medications  gabapentin 300 MG capsule             Return in about  if symptoms worsen or fail to improve. Please refer to diagnosis, orders and patient instructions for further recommendations given. All patient's questions and concerns were addressed appropriately. All orders, handouts were reviewed in detail with the patient and patient voiced understanding verbally.

## 2022-08-15 RX ORDER — METFORMIN HYDROCHLORIDE 500 MG/1
TABLET, EXTENDED RELEASE ORAL
Qty: 120 TABLET | Refills: 0 | Status: SHIPPED | OUTPATIENT
Start: 2022-08-15 | End: 2022-09-14

## 2022-08-16 ENCOUNTER — OFFICE VISIT (OUTPATIENT)
Dept: PSYCHOLOGY | Age: 52
End: 2022-08-16
Payer: COMMERCIAL

## 2022-08-16 DIAGNOSIS — F43.10 PTSD (POST-TRAUMATIC STRESS DISORDER): Primary | ICD-10-CM

## 2022-08-16 PROCEDURE — 90832 PSYTX W PT 30 MINUTES: CPT | Performed by: PSYCHOLOGIST

## 2022-08-16 NOTE — PROGRESS NOTES
Behavioral Health Consultation  Leigh Ann Freeman Psy.D. Psychologist  8/16/2022  3-3:30 PM      Time spent with Patient: 30 minutes  This is patient's seventh [facility] appointment. Reason for Consult: PTSD  Referring Provider: MD YAHAIRA Ortiz [address] [facility],First Floor [address]    TELEHEALTH VISIT -- Audio/Visual (During FOWOY-26 public health emergency)  }  Pursuant to the emergency declaration under the [facility], [facility] waiver authority and the [employer] Act, this Virtual Visit was conducted, with patient's consent, to reduce the patient's risk of exposure to COVID-19 and provide continuity of care for an established patient. Services were provided through a video synchronous discussion virtually to substitute for in-person clinic visit. Pt gave verbal informed consent to participate in telehealth services. Conducted a risk-benefit analysis and determined that the patient's presenting problems are consistent with the use of telepsychology. Determined that the patient has sufficient knowledge and skills in the use of technology enabling them to adequately benefit from telepsychology. It was determined that this patient was able to be properly treated without an in-person session. Patient verified that they were currently located at the address that was provided during registration.     Verified the following information:  Patient's identification: Yes  Patient location: [address] [place] [address]  Patient's call back number: [phone]  Patient's emergency contact's name and number, as well as permission to contact them if needed:  Extended Emergency Contact Information  Primary Emergency Contact: Laverne Vasquez  Address: [address] [place] [address], [address] [place] [address] Phone: [phone]  Mobile Phone: [phone]  Relation: Spouse    Provider location: [place] Depression Severity: 1-4 = Minimal depression, 5-9 = Mild depression, 10-14 = Moderate depression, 15-19 = Moderately severe depression, 20-27 = Severe depression    Diagnosis:    1. PTSD (post-traumatic stress disorder)          Patient Active Problem List   Diagnosis    Hypertension    Moderate episode of recurrent major depressive disorder (HCC)    Right leg pain    Eczema    Right lumbar radiculopathy    Urinary urgency    Obstructive sleep apnea    Memory loss    Abdominal mass    Diabetes mellitus, type 2 (HCC)    Abdominal wall hernia    Hyperlipidemia    Chest pain    Left ventricular hypertrophy    Left atrial dilation    Right atrial dilation    Right ventricular dilation    Syncope    Family history of premature coronary artery disease    Post traumatic stress disorder    Claustrophobia    Anemia    Right foot pain    Left foot pain    Right bundle branch block    Right ankle pain    BMI 50.0-59.9, adult [facility]    Type 2 diabetes mellitus with hemoglobin A1c goal of 7.0%-8.0% ([facility])    Ankle arthritis    Congenital tarsal coalition    Osteoarthritis of subtalar joint    Osteoarthritis of right subtalar joint    Tarsal coalitions    Ankle abrasion with infection    Wound infection after surgery    Type 2 diabetes mellitus without complication ([facility])    Class 3 severe obesity with body mass index (BMI) of 50.0 to 59.9 in adult ([facility] [address]    Insomnia due to mental disorder    RYAN (generalized anxiety disorder)    Primary osteoarthritis    Sciatica of left side    Type 2 diabetes mellitus, uncontrolled, with neuropathy ([facility])    Elevated liver function tests    Precordial pain         Plan:  Pt interventions:  Supportive techniques, Emphasized self-care as important for managing overall health, and Cognitive strategies to target balanced thinking . Pt Behavioral Change Plan:  Pt set the following goals:  1.  Consider reading The Body Keeps the Score by Marjory Forehand der [place] Pagan    Pt may call to schedule F/U visits as needed in the future, although the content of those visits may not include addressing PTSD symptoms, which are being treated by another therapist in the community.

## 2022-08-22 ENCOUNTER — PATIENT MESSAGE (OUTPATIENT)
Dept: FAMILY MEDICINE CLINIC | Age: 52
End: 2022-08-22

## 2022-08-22 DIAGNOSIS — M54.32 LEFT SIDED SCIATICA: ICD-10-CM

## 2022-08-24 ENCOUNTER — TELEPHONE (OUTPATIENT)
Dept: ENDOCRINOLOGY | Age: 52
End: 2022-08-24

## 2022-08-26 DIAGNOSIS — M54.32 LEFT SIDED SCIATICA: ICD-10-CM

## 2022-08-26 RX ORDER — GABAPENTIN 300 MG/1
300 CAPSULE ORAL 2 TIMES DAILY
Qty: 30 CAPSULE | Refills: 5 | Status: CANCELLED | OUTPATIENT
Start: 2022-08-26 | End: 2022-11-24

## 2022-08-26 NOTE — TELEPHONE ENCOUNTER
gabapentin (NEURONTIN) 300 MG capsule [[id number]]  Pt is now taking two a day.   [address] [facility]

## 2022-08-30 RX ORDER — GABAPENTIN 300 MG/1
300 CAPSULE ORAL 2 TIMES DAILY
Qty: 60 CAPSULE | Refills: 5 | Status: SHIPPED | OUTPATIENT
Start: 2022-08-30 | End: 2022-09-29 | Stop reason: SDUPTHER

## 2022-08-30 NOTE — TELEPHONE ENCOUNTER
From: Thania Tamayo  To: Dr. Higuera [place]: 8/22/2022 3:04 PM EDT  Subject: Prescription     Can you please send my new prescription Gabapentin to [address] [place] [address] and you want me to start physical therapy but a I didnt receive any phone calls from them. Thanks

## 2022-08-31 DIAGNOSIS — F43.22 ADJUSTMENT DISORDER WITH ANXIETY: ICD-10-CM

## 2022-09-01 RX ORDER — CLONAZEPAM 0.5 MG/1
TABLET ORAL
Qty: 60 TABLET | Refills: 2 | Status: SHIPPED | OUTPATIENT
Start: 2022-09-01 | End: 2022-11-30

## 2022-09-14 ENCOUNTER — HOSPITAL ENCOUNTER (OUTPATIENT)
Dept: PHYSICAL THERAPY | Age: 52
Setting detail: THERAPIES SERIES
Discharge: HOME | End: 2022-09-14
Payer: COMMERCIAL

## 2022-09-14 PROCEDURE — 97162 PT EVAL MOD COMPLEX 30 MIN: CPT | Performed by: PHYSICAL THERAPIST

## 2022-09-14 PROCEDURE — 97530 THERAPEUTIC ACTIVITIES: CPT | Performed by: PHYSICAL THERAPIST

## 2022-09-14 PROCEDURE — 97110 THERAPEUTIC EXERCISES: CPT | Performed by: PHYSICAL THERAPIST

## 2022-09-14 RX ORDER — METFORMIN HYDROCHLORIDE 500 MG/1
TABLET, EXTENDED RELEASE ORAL
Qty: 120 TABLET | Refills: 1 | Status: SHIPPED | OUTPATIENT
Start: 2022-09-14 | End: 2022-11-16

## 2022-09-14 NOTE — PLAN OF CARE
[facility]  [address] [place] [address], [address]  Phone: [phone]   Fax: [phone]                                            Physical Therapy Certification    Dear Maira Neal MD,    We had the pleasure of evaluating the following patient for physical therapy services at [facility] ([facility]). A summary of our findings can be found in the initial assessment below. This includes our plan of care. If you have any questions or concerns regarding these findings, please do not hesitate to contact me at the office phone number checked above. Thank you for the referral.       Physician Signature:_______________________________Date:__________________  By signing above (or electronic signature), therapist's plan is approved by physician      Patient: Sheila Clark   : 1970   MRN: [id number]  Referring Physician: Maira Neal MD       Evaluation Date: 2022       Medical Diagnosis Information:  Radiculopathy, lumbar region [M54.16]  Treatment Diagnosis Information:  M54.16 - Low Back Pain with Radiculopathy                                       Insurance information:   [employer] - 20 max visits per year, requires prior auth       Precautions/ Contra-indications:   Latex Allergy:  [x]NO      []YES   Preferred Language for Healthcare:   [x]English       []other:  C-SSRS Triggered by Intake questionnaire (Past 2 wk assessment):   [x] No, Questionnaire did not trigger screening.   [] Yes, Patient intake triggered further evaluation      [] C-SSRS Screening completed  [] PCP notified via Plan of Care  [] Emergency services notified    SUBJECTIVE:  History of Present Illness:      Pt presents with c/o low back pain and leg pain for almost 2 months. No known onset, but gradual onset. Pain       Patient reports pain is 6-7 /10 pain at present and 9-10 /10 pain at its worst.  Pain increases with: walking, prolonged positioning.          Decreases with: Neurontin, Aleve, but does not help enough    Pain description:  sharp pain in the back, sharp and burning pain into anterior thigh. Pt. reports pain with coughing, sneezing and laughing:   []Yes   [x]No    []NA     Current Functional Limitations:   [x]Yes   []No  Functional Complaints:  Previously not using walker    PLOF:   [x]No functional limitations   []Pre-exisiting limitations:  Pt's sleep is affected? [x]Yes   []No         Social support/Environment:    Family/caregiver support:   [x]Yes - with wife  []No    Home Environment:     []1 story   []>2 story (bedroom upstairs   []Laundry in basement  [x]Able to live on 1st floor  [x]Steps to [address]: approximately 10 steps to enter. Garage enters into basement, full flight of stairs to get to main level  []No rail     [x]R handrail going up the steps   []L handrail    Bathroom:  []Tub Only   []Walk-in Shower   []Tub/shower combo     []Shower Chair []Grab Bars    []Modified Toilet    []Hand held shower head    Equipment:    []Rolling walker    [x]Standard walker    []Rollator     []Radhames-walker  []Wheelchair    []Quad cane    []Straight cane   []Bedside commode  []Hospital bed   []None  []Other:      Relevant Medical History: hx depression and anxiety with flashbacks.      Co-morbidities/Complexities (which will affect course of rehabilitation): 9  []None           Arthritic conditions   []Rheumatoid arthritis (M05.9)  [x]Osteoarthritis (M19.91)   Cardiovascular conditions   [x]Hypertension (I10)  [x]Hyperlipidemia (E78.5)  []Angina pectoris (I20)  []Atherosclerosis (I70)   Musculoskeletal conditions   []Disc pathology   []Congenital spine pathologies   []Prior surgical intervention  []Osteoporosis (M81.8)  []Osteopenia (M85.8)   Endocrine conditions   []Hypothyroid (E03.9)  []Hyperthyroid Gastrointestinal conditions   []Constipation (Q18.63)   Metabolic conditions   [x]Morbid obesity (E66.01)  [x]Diabetes type 1(E10.65) or 2 (E11.65)   [x]Neuropathy (G60.9)     Pulmonary conditions   []Asthma (J45)  []Coughing   []COPD (J44.9)   Psychological Disorders  [x]Anxiety (F41.9)  [x]Depression (F32.9)   []Other:   []Covid-19    [x]Other: abdominal hernia         Occupation/School: Disabled, awaiting disability claim    Sports/Hobbies/Recreational Activities: any activity/walking    OBJECTIVE:     Functional Scale: FOTO Lumbar Spine  Score: 22/100    Gait/Steps     []Gait WNL unless otherwise noted below:                             [x]Deviations on a level linoleum surface include:  Decreased step length and speed.      Posture: [x] WNL  []Forward head   []Forward flexed trunk    []Scoliosis   []Decreased WB on   []R   []L     []Other:       Quick Tests/Functional Myotome Tests:   Heel Walk (L4):      [x]NT  []Able to perform WNL   []Unable to perform         Toe Walk (S1):       [x]NT  []Able to perform WNL   []Unable to perform        Flexibility    [] All tested [facility]    [x] Deficits indicated as follows: Generally decreased, unable to reach testing positions due to pain    Muscle Abnormal Findings   Hip flexors/Chidi  []Decreased R   []Decreased L      Hamstrings  Degrees in 90/90 []Decreased R   []Decreased L         Right:                   Left:      Gastrocs   []Decreased R   []Decreased L      Obers/TFL/ITB   []Decreased R   []Decreased L      Piriformis    []Decreased R   []Decreased L      Other:    []Decreased R   []Decreased L        Lumbar Range of MotionTesting      []All WFL except as marked below  ROM  Deficits         *denotes pain AROM  (% Decreased) COMMENTS   Flexion 75% *    Extension 50% *    Sidebending Left 50% *    Sidebending Right 50% *    Rotation Left  50% * [x]Seated  []Standing       Rotation Right 50% * [x]Seated  []Standing         [x] Not Tested  Trunk Strength     Trunk Extensors     Gluteals     Abdominals         Lower Extremity Range of Motion/Strength Testing-Myotomes    []Difficulty testing AROM/PROM due to pain   []All strength WFL (5/5) except as marked below    []All myotomes WFL (5/5) except as marked below      Range Tested MMT/ Resisted PROM AROM Comments   *denotes pain Left Right Left Right Left Right    Hip Flexion  (L1-2) 4/5 4/5        Hip Extension          Hip Abduction  (L5)          Hip Adduction  (L3)          Hip IR          Hip ER          Knee Flexion  (L5,S1) 4/5 4/5        Knee Extension  (L3,4) 4/5 4/5            Special Tests Lumbosacral and hip- supine/sidelying/prone    (L) = Laslett's Criteria: 2 positive tests  Special Test Abnormal Findings   Sit up test/ Supine Long sit test  (C) []Neg   []Pos R   []Pos L     Comments:    SI distraction                               (L) []Neg   []Pos   []NT   Thigh Thrust test                         (L) []Neg   []Pos R   []Pos L      90/90 test  []Neg   []Pos R   []Pos L      Gaenslen's test []Neg   []Pos R   []Pos L      Straight Leg Raise []Neg   [x]Pos R   [x]Pos L      Crams []Neg   []Pos R   []Pos L      Lumbar Distraction  []Relief noted   []No relief noted  []Rebound pain   []NT   Hip scour []Neg   []Pos R   []Pos L      Cathy's test []Neg   []Pos R   []Pos L      Mark's test []Neg   []Pos R   []Pos L      Oscillation []Neg   []Pos R   []Pos L      Ant/Post Provocation  []Neg   []Pos R   []Pos L      SI compression                           (L) []Neg   []Pos      Prone knee flexion test               (C) []Neg   []Pos R   []Pos L     Comments:    Femoral nerve tension test []Neg   []Pos R   []Pos L      Pheasant test []Neg   []Pos R   []Pos L      Sacral thrusts                              (L) []Neg   []Pos     []Base   [][place]   []R Sacral Sulcus  []L Sacral Sulcus   []R GABRIELLE   []L GABRIELLE       Deep Tendon Reflexes     [x] Not Tested   []All reflexes WNL or 2+ except as marked below  Abnormal Reflex Findings Left Right Comments   Victor Hugo's Reflex      Biceps (C5,6)      Brachioradialis (C6)      Triceps (C7)      Quadriceps (L3,4)     []Pendular x 3 R  []Pendular x 3 L Achilles (S1,2)      Ankle clonus , # of beats      Babinski's reflex           Dermatomal Sensation   [x]All dermatomes WFL for light touch except as marked below  Abnormal Dermatome Findings Left Right   Anterior groin, 2-3 inches below ASIS (L1-L2) WNL WNL   Middle third anterior thigh (L3) WNL WNL   Patella and med malleolus (L4) WNL WNL   Fibular head and dorsum of foot (L5) WNL WNL   Lateral side and plantar surface of foot (S1) WNL WNL   Medial aspect of posterior thigh (S2) WNL WNL                 Palpation     Patient reported tenderness with palpation:  [x]Yes   []No   []NA  Location:  anterior thigh  PT notes warmth:  []Yes   [x]No   []NA  Location:   PT notes increased muscle tone:    []Yes   [x]No   []NA  Location:   PT notes crepitus with palpation:    []Yes   [x]No   []NA   Location:   Ligament tenderness/provocation:    []Yes   [x]No   []NA  Location:   PT notes decreased scar mobility:    []Yes   [x]No   []NA  Location:      Appearance    PT notes swelling:    [x]Yes   []No   []NA  Location:   bilaterally LE swelling  PT notes redness:   []Yes   []No   []NA  Location: discoloration to distal LE  PT notes drainage:    []Yes   [x]No   []NA  Location:      Girth Measurements (cm)   [x]NT       Specific Joint Mobility Testing/Accessory Motions:    [x]NT    Bandages/Dressings/Incisions: [x]N/A    Functional Testing: not tested this visit due to time restraints. Falls Risk Assessment (30 days): TUG to be performed next visit  [] Falls Risk assessed and no intervention required. [x] Falls Risk assessed and Patient requires intervention due to being higher risk   TUG score (>12s at risk):     [] Falls education provided, including:                       [x] Patient history, allergies, meds reviewed. Medical chart reviewed. See intake form. Review Of Systems (ROS):  [x]Performed Review of systems (Integumentary, CardioPulmonary, Neurological) by intake and observation.  Intake form has been scanned into medical record. Patient has been instructed to contact their primary care physician regarding ROS issues if not already being addressed at this time. ASSESSMENT: Pt is  47 Y. O  male, presenting with c/o  bilateral LE pain and low back pain. Assessment reveals deficits in strength, ROM, and increased pain. Pt will benefit from skilled PT to address these deficits and promote return to highest level of functional independence.       Barriers to/and or personal factors that will affect rehab potential:           []Age  []Sex    []Smoker            []Motivation/Lack of Motivation                      [x]Co-Morbidities            []Cognitive Function, education/learning barriers            []Environmental, home barriers            []profession/work barriers  []past PT/medical experience  []other:  Justification:     Functional Impairments:     [x]Noted lumbar/proximal hip/LE hypomobility   [x]Decreased LE functional ROM   [x]Decreased core/proximal hip strength and neuromuscular control   [x]Decreased LE functional strength   []Reduced balance/proprioceptive control   []Other:      Functional Activity Limitations (from functional questionnaire and intake)   [x]Reduced ability to tolerate prolonged functional positions   [x]Reduced ability or difficulty with changes of positions or transfers between positions   []Reduced ability to maintain good posture and demonstrate good body mechanics with sitting, bending, and lifting   [x]Reduced ability to sleep   []Reduced ability or tolerance with driving and/or computer work   [x]Reduced ability to perform lifting, carrying tasks   [x]Reduced ability to squat   [x]Reduced ability to forward bend   [x]Reduced ability to ambulate prolonged functional periods/distances/surfaces   []Reduced ability to ascend/descend stairs   []Reduced ability to run, hop or jump   []Other:     Participation Restrictions   [x]Reduced participation in self-care activities   [x]Reduced participation in home management activities   [x]Reduced participation in work activities   [x]Reduced participation in social activities   []Reduced participation in sport activities    Classification :    []Signs/symptoms consistent with post-surgical status including decreased ROM, strength and function. []Signs/symptoms consistent with joint sprain/strain   [x]Signs/symptoms consistent with Osteoarthritis   []Signs/symptoms consistent with functional weakness/NMR control      []Signs/symptoms consistent with tendinitis/tendinosis    []Signs/symptoms consistent with pathology which may benefit from Dry needling     []Other:     Prognosis/Rehab Potential:      []Excellent   []Good    [x]Fair   []Poor    Tolerance of evaluation/treatment:    []Excellent   []Good    [x]Fair   []Poor     Physical Therapy Evaluation Complexity Justification  [x] A history of present problem with:  [] no personal factors and/or comorbidities that impact the plan of care;  []1-2 personal factors and/or comorbidities that impact the plan of care  [x]3 personal factors and/or comorbidities that impact the plan of care  [x] An examination of body systems using standardized tests and measures addressing any of the following: body structures and functions (impairments), activity limitations, and/or participation restrictions:  [] a total of 1-2 or more elements   [] a total of 3 or more elements   [x] a total of 4 or more elements   [x] A clinical presentation with:  [] stable and/or uncomplicated characteristics   [x] evolving clinical presentation with changing characteristics  [] unstable and unpredictable characteristics;   [x] Clinical decision making of [] low, [x] moderate, [] high complexity using standardized patient assessment instrument and/or measurable assessment of functional outcome.     [] EVAL (LOW) 68326 (typically 20 minutes face-to-face)  [x] EVAL (MOD) 45437 (typically 30 minutes face-to-face)  [] EVAL (HIGH) 08403 (typically 45 minutes face-to-face)  [] RE-EVAL    PLAN:  Frequency/Duration:  2 days per week for 6 Weeks:  Interventions:  []  (01997) Therapeutic exercise including: strength training, ROM, for Lower extremity and core   []  (02042) NMR activation and proprioception for LE, Glutes and Core.   []  (46711) Manual therapy as indicated for LE, Hip and spine to include: Dry Needling/IASTM, STM, PROM, Gr I-IV mobilizations, manipulation.   []  (27569) Therapeutic activities for LE and core.  []  (19652) Gait Training including gait normalization and reducing fall risk. []  Modalities as needed that may include: thermal agents, E-stim, Biofeedback, US, iontophoresis as indicated  []  Patient education on joint protection, postural re-education, activity modification, progression of HEP      GOALS:  Patient stated goal: \"Reduce Pain\"  [] Progressing: [] Met: [] Not Met: [] Adjusted    Therapist goals for Patient:   Short Term Goals: To be achieved in: 2 weeks  1. Independent in HEP and progression per patient tolerance, in order to prevent re-injury. [] Progressing: [] Met: [] Not Met: [] Adjusted  2. Patient will have a decrease in pain to facilitate improvement in movement, function, and ADLs as indicated by Functional Deficits. [] Progressing: [] Met: [] Not Met: [] Adjusted    Long Term Goals: To be achieved in: 6 weeks  1. Disability index score of 40/100 or higher on the FOTO Lumbar Spine Assessment to assist with reaching prior level of function. [] Progressing: [] Met: [] Not Met: [] Adjusted  2. Patient will demonstrate increased Lumbar Spine AROM WNL and equal to non-involved side to allow for proper joint functioning as indicated by patients Functional Deficits. [] Progressing: [] Met: [] Not Met: [] Adjusted  3. Patient will demonstrate an increase in Strength to good proximal hip strength and control, to allow for proper functional mobility as indicated by patients Functional Deficits.    [] Progressing: [] Met: [] Not Met: [] Adjusted  4. Patient will perform TUG in 12 sec or better to reduce dependence on AD.    [] Progressing: [] Met: [] Not Met: [] Adjusted      Electronically signed by:   , [id number]   Jasmin Stevenson PT, PT, DPT

## 2022-09-14 NOTE — FLOWSHEET NOTE
[facility]  [address], [address], [address]  Phone: [phone]   Fax: [phone]    Physical Therapy Treatment Note/ Progress Report:     Date:  2022     Patient Name:  Vish Martins    :  1970  MRN: [id number]    Preferred Language for Healthcare:   [x]English       []other:    Referring Provider:  Andreas Kearns MD   Follow-up Visit Date:  unknown  Plan of care signed:    [] Yes  [x] No    Medical Diagnosis:  Radiculopathy, lumbar region [M54.16]    Treatment Diagnosis:  M54.16 - Low Back Pain with Radiculopathy     Insurance/Certification information:  James    Progress Report: []  Yes  [x]  No     Date Range for reporting period:  Beginnin2022  Ending:      Progress report due (10 Rx/or 30 days whichever is less):     Recertification due (POC duration/ or 90 days whichever is less): 10/26/2022     Visit # Insurance Allowable Auth Needed    20 per year [x]Yes   []No     SUBJECTIVE:  See Eval  Pain level:  6-9/10     OBJECTIVE:  See Eval  Observation:   Test measurements:      Functional Scale: FOTO Lumbar Spine   Score: 22/100  Date assessed: 2022      RESTRICTIONS/PRECAUTIONS:   Latex Allergy:  [x]NO      []YES    Exercises/Interventions: Exercises in bold performed in department today. Items not bolded are carried forward from prior visits for continuity of the record. Exercise/Equipment Resistance/Repetitions HEP Other comments   Reviewed HEP 5 x trial for each  []      []      []      []      []      []      []      []      []      []      []      []      []      []      []      []      []      []      Home Exercise Program:  Access Code: [email or web address]  URL: [email or web address]  Date: 2022  Prepared by: Rosalina Staff    Exercises  Hooklying Transversus Abdominis Palpation - 1 x daily - 7 x weekly - 10 reps  Supine Lower Trunk Rotation - 1 x daily - 7 x weekly - 10 reps  Modified Chyan John Stretch - 1 x daily - 7 x weekly - 3-4 reps - 30 hold      Therapeutic Exercise: (10 min)  [x] (92939) Provided verbal/tactile cueing for activities related to strengthening, flexibility, endurance, ROM for improvements in LE, proximal hip, and core control with self-care, mobility, lifting, ambulation. Neuromuscular Facilitation:   [] (53671) Provided verbal/tactile cueing for activities related to improving balance, coordination, kinesthetic sense, posture, motor skill, proprioception to assist with LE, proximal hip, and core control in self-care, mobility, lifting, ambulation and eccentric single leg control. Therapeutic Activities: (14 min)  PT educates patient on role of PT, plan of care, and HEP progression, home/activity modifications  [x] (51349) Provided verbal/tactile cueing for activities related to improving balance, coordination, kinesthetic sense, posture, motor skill, proprioception and motor activation to allow for proper function of core, proximal hip and LE with self-care and ADLs and functional mobility.      Gait Training:   [] (89356) Provided training and instruction to the patient for proper LE, core and proximal hip recruitment and positioning and eccentric body weight control with ambulation re-education including up and down stairs     Home Exercise Program:    [] (43537) Reviewed/Progressed HEP activities related to strengthening, flexibility, endurance, ROM of core, proximal hip and LE for functional self-care, mobility, lifting and ambulation/stair navigation   [] (17986) Reviewed/Progressed HEP activities related to improving balance, coordination, kinesthetic sense, posture, motor skill, proprioception of core, proximal hip and LE for self-care, mobility, lifting, and ambulation/stair navigation      Manual Treatments:   [] (71968) Provided manual therapy to mobilize LE, proximal hip and/or LS spine soft tissue/joints for the purpose of modulating pain, promoting relaxation, increasing ROM, reducing/eliminating soft tissue swelling/inflammation/restriction, improving soft tissue extensibility and allowing for proper ROM for normal function with self-care, mobility, lifting and ambulation. Modalities:    [] Electric Stimulation:   [] Ultrasound:   [] Other:       Charges:  Timed Code Treatment Minutes: 24 min   Total Treatment Minutes: 48 min      [] EVAL (LOW) 06240 (typically 20 minutes face-to-face)  [x] EVAL (MOD) 90181 (typically 30 minutes face-to-face)  [] EVAL (HIGH) 58922 (typically 45 minutes face-to-face)  [] RE-EVAL     [x] DG(97625) x    1   [] NMR (20195) x       [] Manual (89736) x       [x] TA (43426) x    1   [] Gait Training (93038) x       [] ES(attended) (82698)  [] ES (un) (61916)   [] Mech Traction (72060)  [] Ultrasound (99002)  [] Other:      GOALS:  Patient stated goal: \"Reduce Pain\"  [] Progressing: [] Met: [] Not Met: [] Adjusted     Therapist goals for Patient:   Short Term Goals: To be achieved in: 2 weeks  1. Independent in HEP and progression per patient tolerance, in order to prevent re-injury. [] Progressing: [] Met: [] Not Met: [] Adjusted  2. Patient will have a decrease in pain to facilitate improvement in movement, function, and ADLs as indicated by Functional Deficits. [] Progressing: [] Met: [] Not Met: [] Adjusted     Long Term Goals: To be achieved in: 6 weeks  1. Disability index score of 40/100 or higher on the FOTO Lumbar Spine Assessment to assist with reaching prior level of function. [] Progressing: [] Met: [] Not Met: [] Adjusted  2. Patient will demonstrate increased Lumbar Spine AROM WNL and equal to non-involved side to allow for proper joint functioning as indicated by patients Functional Deficits. [] Progressing: [] Met: [] Not Met: [] Adjusted  3. Patient will demonstrate an increase in Strength to good proximal hip strength and control, to allow for proper functional mobility as indicated by patients Functional Deficits. [] Progressing: [] Met: [] Not Met: [] Adjusted  4. Patient will perform TUG in 12 sec or better to reduce dependence on AD. [] Progressing: [] Met: [] Not Met: [] Adjusted    ASSESSMENT:  Pt is  47 Y. O  male, presenting with c/o  bilateral LE pain and low back pain. Assessment reveals deficits in strength, ROM, and increased pain. Pt will benefit from skilled PT to address these deficits and promote return to highest level of functional independence. Treatment/Activity Tolerance:  [x] Patient tolerated treatment well [] Patient limited by fatique  [] Patient limited by pain  [] Patient limited by other medical complications  [] Other:     Overall Progression Towards Functional goals/ Treatment Progress Update:  [] Patient is progressing as expected towards functional goals listed. [] Progression is slowed due to complexities/Impairments listed. [] Progression has been slowed due to co-morbidities. [x] Plan just implemented, too soon to assess goals progression <30days   [] Goals require adjustment due to lack of progress  [] Patient is not progressing as expected and requires additional follow up with physician  [] Other    Prognosis for POC: [x] Good [] Fair  [] Poor    Patient requires continued skilled intervention: [x] Yes  [] No        PLAN: 2x per week for 6 weeks, through 10/26/2022  [] Continue per plan of care [] Alter current plan (see comments)  [x] Plan of care initiated [] Hold pending MD visit [] Discharge    Electronically signed by:  , PT [id number]   Dima Steel, PT, PT, DPT    Note: If patient does not return for scheduled/recommended follow up visits, this note will serve as a discharge from care along with the most recent update on progress.

## 2022-09-26 ENCOUNTER — HOSPITAL ENCOUNTER (OUTPATIENT)
Dept: PHYSICAL THERAPY | Age: 52
Setting detail: THERAPIES SERIES
Discharge: HOME | End: 2022-09-26
Payer: COMMERCIAL

## 2022-09-26 PROCEDURE — 97110 THERAPEUTIC EXERCISES: CPT | Performed by: PHYSICAL THERAPIST

## 2022-09-26 NOTE — FLOWSHEET NOTE
[facility] Outpatient Therapy  [address] [place] [address], YAHAIRA [address], [address]  Phone: [phone]   Fax: [phone]    Physical Therapy Treatment Note/ Progress Report:     Date:  2022     Patient Name:  Maynor Holley    :  1970  MRN: [id number]    Preferred Language for Healthcare:   [x]English       []other:    Referring Provider:  Sandra Lieberman MD   Follow-up Visit Date:  unknown  Plan of care signed:    [] Yes  [x] No    Medical Diagnosis:  Radiculopathy, lumbar region [M54.16]    Treatment Diagnosis:  M54.16 - Low Back Pain with Radiculopathy     Insurance/Certification information:  [place]    Progress Report: []  Yes  [x]  No     Date Range for reporting period:  Beginnin2022  Ending:      Progress report due (10 Rx/or 30 days whichever is less):     Recertification due (POC duration/ or 90 days whichever is less): 10/26/2022     Visit # Insurance Allowable Auth Needed    20 per year [x]Yes   []No     SUBJECTIVE:  Pt reports that he is feeling better with some exercise and with medication. Pain level:  6/10 at rest     OBJECTIVE:    Observation: Pt requires frequent rest breaks. Pt reports sharp pains in legs and back with all exercises, but states that it is tolerable. Test measurements:      Functional Scale: FOTO Lumbar Spine   Score: 22/100  Date assessed: 2022      RESTRICTIONS/PRECAUTIONS:   Latex Allergy:  [x]NO      []YES    Exercises/Interventions: Exercises in bold performed in department today. Items not bolded are carried forward from prior visits for continuity of the record.     Exercise/Equipment Resistance/Repetitions HEP Other comments   NuStep 5 min  Seat @ 12  Arms @ 8  Resistance @ 3 [] 68 steps total   Seated Toe Taps 15 x bilat []    Seated Hip Flex/March 10 x R/L []    Transverse Abdominus Palpation/Isometric 10 x 3\" hold [x]    Supine LTR 10 x bilat [x]    Modified Chidi Stretch off side of table 4 x 10\" sense, posture, motor skill, proprioception of core, proximal hip and LE for self-care, mobility, lifting, and ambulation/stair navigation      Manual Treatments:   [] (51193) Provided manual therapy to mobilize LE, proximal hip and/or LS spine soft tissue/joints for the purpose of modulating pain, promoting relaxation,  increasing ROM, reducing/eliminating soft tissue swelling/inflammation/restriction, improving soft tissue extensibility and allowing for proper ROM for normal function with self-care, mobility, lifting and ambulation. Modalities:    [] Electric Stimulation:   [] Ultrasound:   [] Other:       Charges:  Timed Code Treatment Minutes: 39 min   Total Treatment Minutes: 39 min        [] RE-EVAL     [x] ET(70434) x    3   [] NMR (66277) x       [] Manual (15511) x       [] TA (67678) x       [] Gait Training (46833) x       [] ES(attended) (42990)  [] ES (un) (77518)   [] Mech Traction (22906)  [] Ultrasound (38516)  [] Other:      GOALS:  Patient stated goal: \"Reduce Pain\"  [] Progressing: [] Met: [] Not Met: [] Adjusted     Therapist goals for Patient:   Short Term Goals: To be achieved in: 2 weeks  1. Independent in HEP and progression per patient tolerance, in order to prevent re-injury. [] Progressing: [] Met: [] Not Met: [] Adjusted  2. Patient will have a decrease in pain to facilitate improvement in movement, function, and ADLs as indicated by Functional Deficits. [] Progressing: [] Met: [] Not Met: [] Adjusted     Long Term Goals: To be achieved in: 6 weeks  1. Disability index score of 40/100 or higher on the FOTO Lumbar Spine Assessment to assist with reaching prior level of function. [] Progressing: [] Met: [] Not Met: [] Adjusted  2. Patient will demonstrate increased Lumbar Spine AROM WNL and equal to non-involved side to allow for proper joint functioning as indicated by patients Functional Deficits. [] Progressing: [] Met: [] Not Met: [] Adjusted  3.  Patient will demonstrate an increase in Strength to good proximal hip strength and control, to allow for proper functional mobility as indicated by patients Functional Deficits. [] Progressing: [] Met: [] Not Met: [] Adjusted  4. Patient will perform TUG in 12 sec or better to reduce dependence on AD. [] Progressing: [] Met: [] Not Met: [] Adjusted    ASSESSMENT:  Pt is  47 Y. O  male, presenting with c/o  bilateral LE pain and low back pain. Assessment reveals deficits in strength, ROM, and increased pain. Pt will benefit from skilled PT to address these deficits and promote return to highest level of functional independence. Treatment/Activity Tolerance:  [x] Patient tolerated treatment well [] Patient limited by fatique  [] Patient limited by pain  [] Patient limited by other medical complications  [] Other:     Overall Progression Towards Functional goals/ Treatment Progress Update:  [] Patient is progressing as expected towards functional goals listed. [] Progression is slowed due to complexities/Impairments listed. [] Progression has been slowed due to co-morbidities. [x] Plan just implemented, too soon to assess goals progression <30days   [] Goals require adjustment due to lack of progress  [] Patient is not progressing as expected and requires additional follow up with physician  [] Other    Prognosis for POC: [x] Good [] Fair  [] Poor    Patient requires continued skilled intervention: [x] Yes  [] No        PLAN: 2x per week for 6 weeks, through 10/26/2022  [] Continue per plan of care [] Alter current plan (see comments)  [x] Plan of care initiated [] Hold pending MD visit [] Discharge    Electronically signed by:  , PT [id number]   Jacqui Khoury, PT, PT, DPT    Note: If patient does not return for scheduled/recommended follow up visits, this note will serve as a discharge from care along with the most recent update on progress.

## 2022-09-28 ENCOUNTER — HOSPITAL ENCOUNTER (OUTPATIENT)
Dept: PHYSICAL THERAPY | Age: 52
Setting detail: THERAPIES SERIES
Discharge: HOME | End: 2022-09-28
Payer: COMMERCIAL

## 2022-09-28 ENCOUNTER — OFFICE VISIT (OUTPATIENT)
Dept: PSYCHOLOGY | Age: 52
End: 2022-09-28
Payer: COMMERCIAL

## 2022-09-28 DIAGNOSIS — F43.10 PTSD (POST-TRAUMATIC STRESS DISORDER): Primary | ICD-10-CM

## 2022-09-28 DIAGNOSIS — Z23 NEED FOR INFLUENZA VACCINATION: ICD-10-CM

## 2022-09-28 PROCEDURE — 97112 NEUROMUSCULAR REEDUCATION: CPT | Performed by: PHYSICAL THERAPIST

## 2022-09-28 PROCEDURE — 90674 CCIIV4 VAC NO PRSV 0.5 ML IM: CPT | Performed by: PSYCHOLOGIST

## 2022-09-28 PROCEDURE — 90471 IMMUNIZATION ADMIN: CPT | Performed by: PSYCHOLOGIST

## 2022-09-28 PROCEDURE — 90832 PSYTX W PT 30 MINUTES: CPT | Performed by: PSYCHOLOGIST

## 2022-09-28 PROCEDURE — 97110 THERAPEUTIC EXERCISES: CPT | Performed by: PHYSICAL THERAPIST

## 2022-09-28 NOTE — FLOWSHEET NOTE
[facility] Outpatient Therapy  [address], [address] [facility], [facility] [address]  Phone: [phone]   Fax: [phone]    Physical Therapy Treatment Note/ Progress Report:     Date:  2022     Patient Name:  Lindsay Jules    :  1970  MRN: [id number]    Preferred Language for Healthcare:   [x]English       []other:    Referring Provider:  Kobe Stahl MD   Follow-up Visit Date:  unknown  Plan of care signed:    [] Yes  [x] No    Medical Diagnosis:  Radiculopathy, lumbar region [M54.16]    Treatment Diagnosis:  M54.16 - Low Back Pain with Radiculopathy     Insurance/Certification information:  [place]    Progress Report: []  Yes  [x]  No     Date Range for reporting period:  Beginnin2022  Ending:      Progress report due (10 Rx/or 30 days whichever is less):     Recertification due (POC duration/ or 90 days whichever is less): 10/26/2022     Visit # Insurance Allowable Auth Needed   3  / 12 20 per year [x]Yes   []No     SUBJECTIVE:  Pt reports being encouraged seeing improvements with home much exercise he can tolerate. Pain level:  6/10 at rest     OBJECTIVE:    Observation: Pt requires frequent rest breaks. Pt reports sharp pains in legs and back with all exercises, but states that it is tolerable. Test measurements:      Functional Scale: FOTO Lumbar Spine   Score: 22/100  Date assessed: 2022      RESTRICTIONS/PRECAUTIONS:   Latex Allergy:  [x]NO      []YES    Exercises/Interventions: Exercises in bold performed in department today. Items not bolded are carried forward from prior visits for continuity of the record.     Exercise/Equipment Resistance/Repetitions HEP Other comments   NuStep 6.5 min  Seat @ 12  Arms @ 8  Resistance @ 4 [] 123 steps total   Seated Toe Taps 20 x bilat []    Seated Hip Flex/March 15 x R/L []    Seated Knee Ext 10 x R/L []    Seated Glute Set 10 x 3\" hold, Bilat []    Transverse Abdominus Iso 10 x 3\" hold [x]    Supine LTR 10 x Bilat [x]    Modified Chidi Stretch off side of table 5 x 10\" hold, Bilat [x]      []      []      []      []      []      []      []      []      []      []      []      []      Home Exercise Program:  Access Code: [email or web address]  URL: [email or web address]  Date: 09/16/2022  Prepared by: Jeremías Coupe    Exercises  Hooklying Transversus Abdominis Palpation - 1 x daily - 7 x weekly - 10 reps  Supine Lower Trunk Rotation - 1 x daily - 7 x weekly - 10 reps  Modified Chidi Stretch - 1 x daily - 7 x weekly - 3-4 reps - 30 hold    Date: 09/28/2022  Prepared by: Jeremías Coupe    Exercises  Seated March - 1 x daily - 7 x weekly - 10 reps  Seated Long Arc Quad - 1 x daily - 7 x weekly - 10 reps  Seated Gluteal Sets - 1 x daily - 7 x weekly - 10 reps  Seated Toe Taps - 1 x daily - 7 x weekly - 10 reps        Therapeutic Exercise: (36 min)  [x] (15524) Provided verbal/tactile cueing for activities related to strengthening, flexibility, endurance, ROM for improvements in LE, proximal hip, and core control with self-care, mobility, lifting, ambulation. Neuromuscular Facilitation: (10 min)  [x] (83259) Provided verbal/tactile cueing for activities related to improving balance, coordination, kinesthetic sense, posture, motor skill, proprioception to assist with LE, proximal hip, and core control in self-care, mobility, lifting, ambulation and eccentric single leg control. Therapeutic Activities:   [] (72225) Provided verbal/tactile cueing for activities related to improving balance, coordination, kinesthetic sense, posture, motor skill, proprioception and motor activation to allow for proper function of core, proximal hip and LE with self-care and ADLs and functional mobility.      Gait Training:   [] (39368) Provided training and instruction to the patient for proper LE, core and proximal hip recruitment and positioning and eccentric body weight control with ambulation re-education including up and down stairs Home Exercise Program:    [x] (59882) Reviewed/Progressed HEP activities related to strengthening, flexibility, endurance, ROM of core, proximal hip and LE for functional self-care, mobility, lifting and ambulation/stair navigation   [x] (76241) Reviewed/Progressed HEP activities related to improving balance, coordination, kinesthetic sense, posture, motor skill, proprioception of core, proximal hip and LE for self-care, mobility, lifting, and ambulation/stair navigation      Manual Treatments:   [] (34422) Provided manual therapy to mobilize LE, proximal hip and/or LS spine soft tissue/joints for the purpose of modulating pain, promoting relaxation,  increasing ROM, reducing/eliminating soft tissue swelling/inflammation/restriction, improving soft tissue extensibility and allowing for proper ROM for normal function with self-care, mobility, lifting and ambulation. Modalities:    [] Electric Stimulation:   [] Ultrasound:   [] Other:       Charges:  Timed Code Treatment Minutes: 46 min   Total Treatment Minutes: 46 min        [] RE-EVAL     [x] JZ(97350) x    3   [] NMR (64389) x       [] Manual (42968) x       [] TA (01854) x       [] Gait Training (07805) x       [] ES(attended) (94178)  [] ES (un) (23463)   [] Mech Traction (79800)  [] Ultrasound (90209)  [] Other:      GOALS:  Patient stated goal: \"Reduce Pain\"  [] Progressing: [] Met: [] Not Met: [] Adjusted     Therapist goals for Patient:   Short Term Goals: To be achieved in: 2 weeks  1. Independent in HEP and progression per patient tolerance, in order to prevent re-injury. [] Progressing: [] Met: [] Not Met: [] Adjusted  2. Patient will have a decrease in pain to facilitate improvement in movement, function, and ADLs as indicated by Functional Deficits. [] Progressing: [] Met: [] Not Met: [] Adjusted     Long Term Goals: To be achieved in: 6 weeks  1.  Disability index score of 40/100 or higher on the FOTO Lumbar Spine Assessment to assist with reaching prior level of function. [] Progressing: [] Met: [] Not Met: [] Adjusted  2. Patient will demonstrate increased Lumbar Spine AROM WNL and equal to non-involved side to allow for proper joint functioning as indicated by patients Functional Deficits. [] Progressing: [] Met: [] Not Met: [] Adjusted  3. Patient will demonstrate an increase in Strength to good proximal hip strength and control, to allow for proper functional mobility as indicated by patients Functional Deficits. [] Progressing: [] Met: [] Not Met: [] Adjusted  4. Patient will perform TUG in 12 sec or better to reduce dependence on AD. [] Progressing: [] Met: [] Not Met: [] Adjusted    ASSESSMENT:  Pt is  47 Y. O  male, presenting with c/o  bilateral LE pain and low back pain. Assessment reveals deficits in strength, ROM, and increased pain. Pt will benefit from skilled PT to address these deficits and promote return to highest level of functional independence. Treatment/Activity Tolerance:  [x] Patient tolerated treatment well [] Patient limited by fatique  [] Patient limited by pain  [] Patient limited by other medical complications  [] Other:     Overall Progression Towards Functional goals/ Treatment Progress Update:  [] Patient is progressing as expected towards functional goals listed. [] Progression is slowed due to complexities/Impairments listed. [] Progression has been slowed due to co-morbidities.   [x] Plan just implemented, too soon to assess goals progression <30days   [] Goals require adjustment due to lack of progress  [] Patient is not progressing as expected and requires additional follow up with physician  [] Other    Prognosis for POC: [x] Good [] Fair  [] Poor    Patient requires continued skilled intervention: [x] Yes  [] No        PLAN: 2x per week for 6 weeks, through 10/26/2022  [] Continue per plan of care [] Alter current plan (see comments)  [x] Plan of care initiated [] Hold pending MD visit [] Discharge    Electronically signed by:  , PT [id number]   Roque Ramos, PT, PT, DPT    Note: If patient does not return for scheduled/recommended follow up visits, this note will serve as a discharge from care along with the most recent update on progress.

## 2022-09-28 NOTE — PROGRESS NOTES
Behavioral Health Consultation  Imelda Keating Psy.D. Psychologist  9/28/2022  4-4:30 PM      Time spent with Patient: 30 minutes  This is patient's eighth [facility] appointment. Reason for Consult: PTSD  Referring Provider: Ricky Coombs MD  [address] [facility] [address]      S:  Pt was recently hospitalized with severe sciatic pain. Pt is doing PT; finding it helpful. 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 Pt lost that job because he took frequent restroom breaks d/t medication side effects and health conditions. He continues to focus on staying positive and working through PTSD with his therapist Dandre Dodson. O:  Interventions:  -Supportive techniques  -Discussed recent experiences  -Reinforced his efforts towards self-care  -Highlighted areas of progress  -Clarified that pt should continue working with his therapist in the community rather than checking in with this writer. A:  MSE:  Appearance: good hygiene  and appropriate attire  Attitude: cooperative and friendly  Consciousness: alert  Orientation: oriented to person, place, time, general circumstance  Memory: recent and remote memory intact  Attention/Concentration: intact during session  Psychomotor Activity: normal  Eye Contact: normal  Speech: normal rate and volume, well-articulated  Mood: mildly anxious  Affect: congruent, brighter  Perception: within normal limits  Thought Content: within normal limits  Thought Process: logical, coherent and goal-directed  Insight: good  Judgment: intact  Ability to understand instructions: Yes  Ability to respond meaningfully: Yes  Morbid Ideation: no   Suicide Assessment: no suicidal ideation, plan, or intent. Appropriate for outpatient/telehealth care at this time.   Homicidal Ideation: no    PHQ Scores 8/12/2022 8/2/2022 6/8/2022 4/8/2022 1/31/2022 2/8/2018 9/19/2016   PHQ2 Score 6 6 0 3 1 3 6   PHQ9 Score 22 19 13 12 4 16 23     Interpretation of Total Score Depression Severity: 1-4 = Minimal depression, 5-9 = Mild depression, 10-14 = Moderate depression, 15-19 = Moderately severe depression, 20-27 = Severe depression    Diagnosis:    1. PTSD (post-traumatic stress disorder)            Patient Active Problem List   Diagnosis    Hypertension    Moderate episode of recurrent major depressive disorder (HCC)    Right leg pain    Eczema    Right lumbar radiculopathy    Urinary urgency    Obstructive sleep apnea    Memory loss    Abdominal mass    Diabetes mellitus, type 2 (HCC)    Abdominal wall hernia    Hyperlipidemia    Chest pain    Left ventricular hypertrophy    Left atrial dilation    Right atrial dilation    Right ventricular dilation    Syncope    Family history of premature coronary artery disease    Post traumatic stress disorder    Claustrophobia    Anemia    Right foot pain    Left foot pain    Right bundle branch block    Right ankle pain    BMI 50.0-59.9, adult ([facility]    Type 2 diabetes mellitus with hemoglobin A1c goal of 7.0%-8.0% ([facility])    Ankle arthritis    Congenital tarsal coalition    Osteoarthritis of subtalar joint    Osteoarthritis of right subtalar joint    Tarsal coalitions    Ankle abrasion with infection    Wound infection after surgery    Type 2 diabetes mellitus without complication ([facility])    Class 3 severe obesity with body mass index (BMI) of 50.0 to 59.9 in adult ([facility]    Insomnia due to mental disorder    RYAN (generalized anxiety disorder)    Primary osteoarthritis    Sciatica of left side    Type 2 diabetes mellitus, uncontrolled, with neuropathy (HCC)    Elevated liver function tests    Precordial pain         Plan:  Pt interventions:  Supportive techniques, Emphasized self-care as important for managing overall health, and Cognitive strategies to target balanced thinking . Pt Behavioral Change Plan:  Pt set the following goals:  1.  Consider reading The Body Keeps the Score by Marcus Leahy    Pt is being treated by another therapist in the community. No F/U visits with this writer will be scheduled at this time.

## 2022-09-29 DIAGNOSIS — M54.32 LEFT SIDED SCIATICA: ICD-10-CM

## 2022-09-30 RX ORDER — GABAPENTIN 300 MG/1
300 CAPSULE ORAL 2 TIMES DAILY
Qty: 60 CAPSULE | Refills: 5 | Status: SHIPPED | OUTPATIENT
Start: 2022-09-30 | End: 2022-10-14 | Stop reason: SDUPTHER

## 2022-10-03 ENCOUNTER — HOSPITAL ENCOUNTER (OUTPATIENT)
Dept: PHYSICAL THERAPY | Age: 52
Setting detail: THERAPIES SERIES
Discharge: HOME | End: 2022-10-03
Payer: COMMERCIAL

## 2022-10-03 PROCEDURE — 97110 THERAPEUTIC EXERCISES: CPT | Performed by: PHYSICAL THERAPIST

## 2022-10-03 NOTE — FLOWSHEET NOTE
[facility] Outpatient Therapy  [address], [address] [facility], [address] [place] [address]  Phone: [phone]   Fax: [phone]    Physical Therapy Treatment Note/ Progress Report:     Date:  10/03/2022     Patient Name:  Ghada Sauceda    :  1970  MRN: [id number]    Preferred Language for Healthcare:   [x]English       []other:    Referring Provider:  Lawrence Tee MD   Follow-up Visit Date:  unknown  Plan of care signed:    [x] Yes  [] No    Medical Diagnosis:  Radiculopathy, lumbar region [M54.16]    Treatment Diagnosis:  M54.16 - Low Back Pain with Radiculopathy     Insurance/Certification information:  James    Progress Report: []  Yes  [x]  No     Date Range for reporting period:  Beginnin2022  Ending:      Progress report due (10 Rx/or 30 days whichever is less):     Recertification due (POC duration/ or 90 days whichever is less): 10/26/2022     Visit # Insurance Allowable Auth Needed    20 per year [x]Yes   []No     SUBJECTIVE:  No new complaints today. Reports difficulty sleeping due to both pain and anxiety/depression. Pain level:  6/10 at rest     OBJECTIVE:    Observation:  Pt requires frequent rest.  Tolerating increased repetitions well. Test measurements:      Functional Scale: FOTO Lumbar Spine   Score: 22/100  Date assessed: 2022      RESTRICTIONS/PRECAUTIONS:   Latex Allergy:  [x]NO      []YES    Exercises/Interventions: Exercises in bold performed in department today. Items not bolded are carried forward from prior visits for continuity of the record.     Exercise/Equipment Resistance/Repetitions HEP Other comments   NuStep 5 min  Seat @ 11  Arms @ 8  Resistance @ 4 [] 127 steps total   Seated Toe Taps 20 x bilat [x]    Seated Hip Flex/March 20 x R/L [x]    Seated Knee Ext 15 x R/L [x]    Seated Glute Set 15 x 3\" hold, Bilat [x]    Transverse Abdominus Iso 15 x 3\" hold [x]    Supine LTR 12 x Bilat [x]    Modified Chidi Stretch off side of table 5 x 10\" hold, Bilat [x]    Standing Hip Abduction 10 x R/L []    Standing Heel Raises 15 x []      []      []      []      []      []      []      []      []      []      []      Home Exercise Program:  Access Code: [email or web address]  URL: [email or web address]  Date: 09/16/2022  Prepared by: Isa Sluder    Exercises  Hooklying Transversus Abdominis Palpation - 1 x daily - 7 x weekly - 10 reps  Supine Lower Trunk Rotation - 1 x daily - 7 x weekly - 10 reps  Modified Chidi Stretch - 1 x daily - 7 x weekly - 3-4 reps - 30 hold    Date: 09/28/2022  Prepared by: Isa Sluder    Exercises  Seated March - 1 x daily - 7 x weekly - 10 reps  Seated Long Arc Quad - 1 x daily - 7 x weekly - 10 reps  Seated Gluteal Sets - 1 x daily - 7 x weekly - 10 reps  Seated Toe Taps - 1 x daily - 7 x weekly - 10 reps        Therapeutic Exercise: (38 min)  [x] (15014) Provided verbal/tactile cueing for activities related to strengthening, flexibility, endurance, ROM for improvements in LE, proximal hip, and core control with self-care, mobility, lifting, ambulation. Neuromuscular Facilitation: (5 min)  [x] (27611) Provided verbal/tactile cueing for activities related to improving balance, coordination, kinesthetic sense, posture, motor skill, proprioception to assist with LE, proximal hip, and core control in self-care, mobility, lifting, ambulation and eccentric single leg control. Therapeutic Activities:   [] (00638) Provided verbal/tactile cueing for activities related to improving balance, coordination, kinesthetic sense, posture, motor skill, proprioception and motor activation to allow for proper function of core, proximal hip and LE with self-care and ADLs and functional mobility.      Gait Training:   [] (20013) Provided training and instruction to the patient for proper LE, core and proximal hip recruitment and positioning and eccentric body weight control with ambulation re-education including up and down stairs     Home Exercise Program:    [x] (77530) Reviewed/Progressed HEP activities related to strengthening, flexibility, endurance, ROM of core, proximal hip and LE for functional self-care, mobility, lifting and ambulation/stair navigation   [x] (06544) Reviewed/Progressed HEP activities related to improving balance, coordination, kinesthetic sense, posture, motor skill, proprioception of core, proximal hip and LE for self-care, mobility, lifting, and ambulation/stair navigation      Manual Treatments:   [] (78260) Provided manual therapy to mobilize LE, proximal hip and/or LS spine soft tissue/joints for the purpose of modulating pain, promoting relaxation,  increasing ROM, reducing/eliminating soft tissue swelling/inflammation/restriction, improving soft tissue extensibility and allowing for proper ROM for normal function with self-care, mobility, lifting and ambulation. Modalities:    [] Electric Stimulation:   [] Ultrasound:   [] Other:       Charges:  Timed Code Treatment Minutes: 43 min   Total Treatment Minutes: 43 min        [] RE-EVAL     [x] TP(68715) x    3   [] NMR (96711) x       [] Manual (85331) x       [] TA (07726) x       [] Gait Training (60286) x       [] ES(attended) (29960)  [] ES (un) (81151)   [] Mech Traction (14984)  [] Ultrasound (36480)  [] Other:      GOALS:  Patient stated goal: \"Reduce Pain\"  [] Progressing: [] Met: [] Not Met: [] Adjusted     Therapist goals for Patient:   Short Term Goals: To be achieved in: 2 weeks  1. Independent in HEP and progression per patient tolerance, in order to prevent re-injury. [] Progressing: [] Met: [] Not Met: [] Adjusted  2. Patient will have a decrease in pain to facilitate improvement in movement, function, and ADLs as indicated by Functional Deficits. [] Progressing: [] Met: [] Not Met: [] Adjusted     Long Term Goals: To be achieved in: 6 weeks  1.  Disability index score of 40/100 or higher on the FOTO Lumbar Spine Assessment to assist with reaching prior level of function. [] Progressing: [] Met: [] Not Met: [] Adjusted  2. Patient will demonstrate increased Lumbar Spine AROM WNL and equal to non-involved side to allow for proper joint functioning as indicated by patients Functional Deficits. [] Progressing: [] Met: [] Not Met: [] Adjusted  3. Patient will demonstrate an increase in Strength to good proximal hip strength and control, to allow for proper functional mobility as indicated by patients Functional Deficits. [] Progressing: [] Met: [] Not Met: [] Adjusted  4. Patient will perform TUG in 12 sec or better to reduce dependence on AD. [] Progressing: [] Met: [] Not Met: [] Adjusted    ASSESSMENT:  Pt is  47 Y. O  male, presenting with c/o  bilateral LE pain and low back pain. Assessment reveals deficits in strength, ROM, and increased pain. Pt will benefit from skilled PT to address these deficits and promote return to highest level of functional independence. Treatment/Activity Tolerance:  [x] Patient tolerated treatment well [] Patient limited by fatique  [] Patient limited by pain  [] Patient limited by other medical complications  [] Other:     Overall Progression Towards Functional goals/ Treatment Progress Update:  [] Patient is progressing as expected towards functional goals listed. [] Progression is slowed due to complexities/Impairments listed. [] Progression has been slowed due to co-morbidities.   [x] Plan just implemented, too soon to assess goals progression <30days   [] Goals require adjustment due to lack of progress  [] Patient is not progressing as expected and requires additional follow up with physician  [] Other    Prognosis for POC: [x] Good [] Fair  [] Poor    Patient requires continued skilled intervention: [x] Yes  [] No        PLAN: 2x per week for 6 weeks, through 10/26/2022  [x] Continue per plan of care [] Alter current plan (see comments)  [] Plan of care initiated [] Hold pending MD visit [] Discharge    Electronically signed by:  , PT [id number]   Rupal Epps, PT, PT, DPT    Note: If patient does not return for scheduled/recommended follow up visits, this note will serve as a discharge from care along with the most recent update on progress.

## 2022-10-05 ENCOUNTER — HOSPITAL ENCOUNTER (OUTPATIENT)
Dept: PHYSICAL THERAPY | Age: 52
Setting detail: THERAPIES SERIES
Discharge: HOME | End: 2022-10-05
Payer: COMMERCIAL

## 2022-10-05 PROCEDURE — 97110 THERAPEUTIC EXERCISES: CPT | Performed by: PHYSICAL THERAPIST

## 2022-10-05 NOTE — FLOWSHEET NOTE
[facility] Outpatient Therapy  [address] [place] [address] [address]  Phone: [phone]   Fax: [phone]    Physical Therapy Treatment Note/ Progress Report:     Date:  10/05/2022     Patient Name:  Casey August    :  1970  MRN: [id number]    Preferred Language for Healthcare:   [x]English       []other:    Referring Provider:  Nayeli Miranda MD   Follow-up Visit Date:  unknown  Plan of care signed:    [x] Yes  [] No    Medical Diagnosis:  Radiculopathy, lumbar region [M54.16]    Treatment Diagnosis:  M54.16 - Low Back Pain with Radiculopathy     Insurance/Certification information:  James    Progress Report: []  Yes  [x]  No     Date Range for reporting period:  Beginnin2022  Ending:      Progress report due (10 Rx/or 30 days whichever is less):     Recertification due (POC duration/ or 90 days whichever is less): 10/26/2022     Visit # Insurance Allowable Auth Needed    20 per year  8 approved through 22 [x]Yes   []No     SUBJECTIVE:  Pt reports continued pain with activity. Pain level:  6/10 at rest     OBJECTIVE:    Observation:  Pt tolerates increased resistance and repetitions   Test measurements:      Functional Scale: FOTO Lumbar Spine   Score: 22/100  Date assessed: 2022      Functional Scale: FOTO Lumbar Spine   Score: 29/100  Date assessed: 10/5/2022      RESTRICTIONS/PRECAUTIONS:   Latex Allergy:  [x]NO      []YES    Exercises/Interventions: Exercises in bold performed in department today. Items not bolded are carried forward from prior visits for continuity of the record.     Exercise/Equipment Resistance/Repetitions HEP Other comments   NuStep 6 min  Seat @ 12  Arms @ 8  Resistance @ 5 [] 200 steps total   Seated Toe Taps 20 x bilat [x]    Seated Hip Flex/March 20 x R/L [x]    Seated Knee Ext 15 x R/L [x]    Seated Glute Set 15 x 3\" hold, Bilat [x]    Transverse Abdominus Iso 15 x 3\" hold [x]    Supine LTR 12 x Bilat [x]    Modified Chidi Stretch off side of table 5 x 10\" hold, Bilat [x]    Standing Hip Abduction 10 x R/L []    Standing Heel Raises 15 x []    Seated Lumbar Rotation 5 x 5\" hold, R/L []      []      []      []      []      []      []      []      []      []      Home Exercise Program:  Access Code: [email or web address]  URL: [email or web address]  Date: 09/16/2022  Prepared by: Isa Sluder    Exercises  Hooklying Transversus Abdominis Palpation - 1 x daily - 7 x weekly - 10 reps  Supine Lower Trunk Rotation - 1 x daily - 7 x weekly - 10 reps  Modified Chidi Stretch - 1 x daily - 7 x weekly - 3-4 reps - 30 hold    Date: 09/28/2022  Prepared by: Isa Sluder    Exercises  Seated March - 1 x daily - 7 x weekly - 10 reps  Seated Long Arc Quad - 1 x daily - 7 x weekly - 10 reps  Seated Gluteal Sets - 1 x daily - 7 x weekly - 10 reps  Seated Toe Taps - 1 x daily - 7 x weekly - 10 reps      Therapeutic Exercise: (42 min)  [x] (91521) Provided verbal/tactile cueing for activities related to strengthening, flexibility, endurance, ROM for improvements in LE, proximal hip, and core control with self-care, mobility, lifting, ambulation. Neuromuscular Facilitation: (5 min)  [x] (45748) Provided verbal/tactile cueing for activities related to improving balance, coordination, kinesthetic sense, posture, motor skill, proprioception to assist with LE, proximal hip, and core control in self-care, mobility, lifting, ambulation and eccentric single leg control. Therapeutic Activities:   [] (91000) Provided verbal/tactile cueing for activities related to improving balance, coordination, kinesthetic sense, posture, motor skill, proprioception and motor activation to allow for proper function of core, proximal hip and LE with self-care and ADLs and functional mobility.      Gait Training:   [] (14749) Provided training and instruction to the patient for proper LE, core and proximal hip recruitment and positioning and eccentric body weight control with ambulation re-education including up and down stairs     Home Exercise Program:    [x] (06912) Reviewed/Progressed HEP activities related to strengthening, flexibility, endurance, ROM of core, proximal hip and LE for functional self-care, mobility, lifting and ambulation/stair navigation   [x] (92714) Reviewed/Progressed HEP activities related to improving balance, coordination, kinesthetic sense, posture, motor skill, proprioception of core, proximal hip and LE for self-care, mobility, lifting, and ambulation/stair navigation      Manual Treatments:   [] (44481) Provided manual therapy to mobilize LE, proximal hip and/or LS spine soft tissue/joints for the purpose of modulating pain, promoting relaxation,  increasing ROM, reducing/eliminating soft tissue swelling/inflammation/restriction, improving soft tissue extensibility and allowing for proper ROM for normal function with self-care, mobility, lifting and ambulation. Modalities:    [] Electric Stimulation:   [] Ultrasound:   [] Other:       Charges:  Timed Code Treatment Minutes: 47 min   Total Treatment Minutes: 47 min        [] RE-EVAL     [x] NM(16192) x    3   [] NMR (16547) x       [] Manual (27031) x       [] TA (56274) x       [] Gait Training (51086) x       [] ES(attended) (19698)  [] ES (un) (11793)   [] Mech Traction (74309)  [] Ultrasound (91054)  [] Other:      GOALS:  Patient stated goal: \"Reduce Pain\"  [] Progressing: [] Met: [] Not Met: [] Adjusted     Therapist goals for Patient:   Short Term Goals: To be achieved in: 2 weeks  1. Independent in HEP and progression per patient tolerance, in order to prevent re-injury. [] Progressing: [] Met: [] Not Met: [] Adjusted  2. Patient will have a decrease in pain to facilitate improvement in movement, function, and ADLs as indicated by Functional Deficits. [] Progressing: [] Met: [] Not Met: [] Adjusted     Long Term Goals: To be achieved in: 6 weeks  1. Disability index score of 40/100 or higher on the FOTO Lumbar Spine Assessment to assist with reaching prior level of function. [] Progressing: [] Met: [] Not Met: [] Adjusted  2. Patient will demonstrate increased Lumbar Spine AROM WNL and equal to non-involved side to allow for proper joint functioning as indicated by patients Functional Deficits. [] Progressing: [] Met: [] Not Met: [] Adjusted  3. Patient will demonstrate an increase in Strength to good proximal hip strength and control, to allow for proper functional mobility as indicated by patients Functional Deficits. [] Progressing: [] Met: [] Not Met: [] Adjusted  4. Patient will perform TUG in 12 sec or better to reduce dependence on AD. [] Progressing: [] Met: [] Not Met: [] Adjusted    ASSESSMENT:  Pt is  47 Y. O  male, presenting with c/o  bilateral LE pain and low back pain. Assessment reveals deficits in strength, ROM, and increased pain. Pt demonstrating increased endurance, and improved FOTO score from 22/100 to 29/100. Pt will benefit from skilled PT to address these deficits and promote return to highest level of functional independence. Treatment/Activity Tolerance:  [x] Patient tolerated treatment well [] Patient limited by fatique  [] Patient limited by pain  [] Patient limited by other medical complications  [] Other:     Overall Progression Towards Functional goals/ Treatment Progress Update:  [x] Patient is progressing as expected towards functional goals listed. [] Progression is slowed due to complexities/Impairments listed. [] Progression has been slowed due to co-morbidities.   [] Plan just implemented, too soon to assess goals progression <30days   [] Goals require adjustment due to lack of progress  [] Patient is not progressing as expected and requires additional follow up with physician  [] Other    Prognosis for POC: [x] Good [] Fair  [] Poor    Patient requires continued skilled intervention: [x] Yes  [] No PLAN: 2x per week for 6 weeks, through 10/26/2022  [x] Continue per plan of care [] Alter current plan (see comments)  [] Plan of care initiated [] Hold pending MD visit [] Discharge    Electronically signed by:  , PT [id number]   Noah Villafana, PT, PT, DPT    Note: If patient does not return for scheduled/recommended follow up visits, this note will serve as a discharge from care along with the most recent update on progress.

## 2022-10-10 ENCOUNTER — HOSPITAL ENCOUNTER (OUTPATIENT)
Dept: PHYSICAL THERAPY | Age: 52
Setting detail: THERAPIES SERIES
Discharge: HOME | End: 2022-10-10
Payer: COMMERCIAL

## 2022-10-10 PROCEDURE — 97110 THERAPEUTIC EXERCISES: CPT | Performed by: PHYSICAL THERAPIST

## 2022-10-10 NOTE — FLOWSHEET NOTE
[facility] Outpatient Therapy  [address], [address]  Phone: [phone]   Fax: [phone]    Physical Therapy Treatment Note/ Progress Report:     Date:  10/10/2022     Patient Name:  Zenobia Yeboah    :  1970  MRN: [id number]    Preferred Language for Healthcare:   [x]English       []other:    Referring Provider:  Raffi Sampson MD   Follow-up Visit Date:  unknown  Plan of care signed:    [x] Yes  [] No    Medical Diagnosis:  Radiculopathy, lumbar region [M54.16]    Treatment Diagnosis:  M54.16 - Low Back Pain with Radiculopathy     Insurance/Certification information:  James    Progress Report: []  Yes  [x]  No     Date Range for reporting period:  Beginnin2022  Ending:      Progress report due (10 Rx/or 30 days whichever is less):     Recertification due (POC duration/ or 90 days whichever is less): 10/26/2022     Visit # Insurance Allowable Auth Needed    per year  8 approved through 22 [x]Yes   []No     SUBJECTIVE:  Pt reports continued pain with activity. Pain level:  5.5/10 at rest     OBJECTIVE:    Observation:  Pt tolerates increased resistance and repetitions   Test measurements:      Functional Scale: FOTO Lumbar Spine   Score: 22/100  Date assessed: 2022      Functional Scale: FOTO Lumbar Spine   Score: 29/100  Date assessed: 10/5/2022      RESTRICTIONS/PRECAUTIONS:   Latex Allergy:  [x]NO      []YES    Exercises/Interventions: Exercises in bold performed in department today. Items not bolded are carried forward from prior visits for continuity of the record.     Exercise/Equipment Resistance/Repetitions HEP Other comments   NuStep 7 min  Seat @ 12  Arms @ 8  Resistance @ 5 [] 282 steps total   Seated Toe Taps 20 x bilat [x]    Seated Hip Flex/March 20 x R/L [x]    Seated Knee Ext 15 x R/L [x]    Seated Glute Set 15 x 3\" hold, Bilat [x]    Transverse Abdominus Iso 15 x 3\" hold [x]    Supine LTR 15 x Bilat [x]    Modified Chidi Stretch off side of table 5 x 10\" hold, Bilat [x]    Standing Hip Abduction 15 x R/L []    Standing Heel Raises 15 x []    Seated Lumbar Rotation  []      []      []      []      []      []      []      []      []      []      Home Exercise Program:  Access Code: [email or web address]  URL: [employer]/  Date: 09/16/2022  Prepared by: Cleatrice Pulling    Exercises  Hooklying Transversus Abdominis Palpation - 1 x daily - 7 x weekly - 10 reps  Supine Lower Trunk Rotation - 1 x daily - 7 x weekly - 10 reps  Modified Chidi Stretch - 1 x daily - 7 x weekly - 3-4 reps - 30 hold    Date: 09/28/2022  Prepared by: Cleatrice Pulling    Exercises  Seated March - 1 x daily - 7 x weekly - 10 reps  Seated Long Arc Quad - 1 x daily - 7 x weekly - 10 reps  Seated Gluteal Sets - 1 x daily - 7 x weekly - 10 reps  Seated Toe Taps - 1 x daily - 7 x weekly - 10 reps      Therapeutic Exercise: (40 min)  [x] (19304) Provided verbal/tactile cueing for activities related to strengthening, flexibility, endurance, ROM for improvements in LE, proximal hip, and core control with self-care, mobility, lifting, ambulation. Neuromuscular Facilitation: (5 min)  [x] (24333) Provided verbal/tactile cueing for activities related to improving balance, coordination, kinesthetic sense, posture, motor skill, proprioception to assist with LE, proximal hip, and core control in self-care, mobility, lifting, ambulation and eccentric single leg control. Therapeutic Activities:   [] (70913) Provided verbal/tactile cueing for activities related to improving balance, coordination, kinesthetic sense, posture, motor skill, proprioception and motor activation to allow for proper function of core, proximal hip and LE with self-care and ADLs and functional mobility.      Gait Training:   [] (10712) Provided training and instruction to the patient for proper LE, core and proximal hip recruitment and positioning and eccentric body weight control with ambulation re-education including up and down stairs     Home Exercise Program:    [x] (71911) Reviewed/Progressed HEP activities related to strengthening, flexibility, endurance, ROM of core, proximal hip and LE for functional self-care, mobility, lifting and ambulation/stair navigation   [x] (70277) Reviewed/Progressed HEP activities related to improving balance, coordination, kinesthetic sense, posture, motor skill, proprioception of core, proximal hip and LE for self-care, mobility, lifting, and ambulation/stair navigation      Manual Treatments:   [] (49922) Provided manual therapy to mobilize LE, proximal hip and/or LS spine soft tissue/joints for the purpose of modulating pain, promoting relaxation,  increasing ROM, reducing/eliminating soft tissue swelling/inflammation/restriction, improving soft tissue extensibility and allowing for proper ROM for normal function with self-care, mobility, lifting and ambulation. Modalities:    [] Electric Stimulation:   [] Ultrasound:   [] Other:       Charges:  Timed Code Treatment Minutes: 45 min   Total Treatment Minutes: 45 min        [] RE-EVAL     [x] JZ(76920) x    3   [] NMR (93728) x       [] Manual (05052) x       [] TA (21738) x       [] Gait Training (32495) x       [] ES(attended) (26273)  [] ES (un) (66017)   [] Mech Traction (68366)  [] Ultrasound (90880)  [] Other:      GOALS:  Patient stated goal: \"Reduce Pain\"  [] Progressing: [] Met: [] Not Met: [] Adjusted     Therapist goals for Patient:   Short Term Goals: To be achieved in: 2 weeks  1. Independent in HEP and progression per patient tolerance, in order to prevent re-injury. [] Progressing: [x] Met: [] Not Met: [] Adjusted  2. Patient will have a decrease in pain to facilitate improvement in movement, function, and ADLs as indicated by Functional Deficits. [x] Progressing: [] Met: [] Not Met: [] Adjusted     Long Term Goals: To be achieved in: 6 weeks  1.  Disability index score of 40/100 or higher on the FOTO Lumbar Spine Assessment to assist with reaching prior level of function. [] Progressing: [] Met: [] Not Met: [] Adjusted  2. Patient will demonstrate increased Lumbar Spine AROM WNL and equal to non-involved side to allow for proper joint functioning as indicated by patients Functional Deficits. [] Progressing: [] Met: [] Not Met: [] Adjusted  3. Patient will demonstrate an increase in Strength to good proximal hip strength and control, to allow for proper functional mobility as indicated by patients Functional Deficits. [] Progressing: [] Met: [] Not Met: [] Adjusted  4. Patient will perform TUG in 12 sec or better to reduce dependence on AD. [] Progressing: [] Met: [] Not Met: [] Adjusted    ASSESSMENT:  Pt is  47 Y. O  male, presenting with c/o  bilateral LE pain and low back pain. Assessment reveals deficits in strength, ROM, and increased pain. Pt demonstrating increased endurance, and improved FOTO score from 22/100 to 29/100. Pt will benefit from skilled PT to address these deficits and promote return to highest level of functional independence. Treatment/Activity Tolerance:  [x] Patient tolerated treatment well [] Patient limited by fatique  [] Patient limited by pain  [] Patient limited by other medical complications  [] Other:     Overall Progression Towards Functional goals/ Treatment Progress Update:  [x] Patient is progressing as expected towards functional goals listed. [] Progression is slowed due to complexities/Impairments listed. [] Progression has been slowed due to co-morbidities.   [] Plan just implemented, too soon to assess goals progression <30days   [] Goals require adjustment due to lack of progress  [] Patient is not progressing as expected and requires additional follow up with physician  [] Other    Prognosis for POC: [x] Good [] Fair  [] Poor    Patient requires continued skilled intervention: [x] Yes  [] No        PLAN: 2x per week for 6 weeks, through 10/26/2022  [x] Continue per plan of care [] Alter current plan (see comments)  [] Plan of care initiated [] Hold pending MD visit [] Discharge    Electronically signed by:  , PT [id number]   Delfin Jones, PT, PT, DPT    Note: If patient does not return for scheduled/recommended follow up visits, this note will serve as a discharge from care along with the most recent update on progress.

## 2022-10-11 DIAGNOSIS — E78.2 MIXED HYPERLIPIDEMIA: ICD-10-CM

## 2022-10-11 DIAGNOSIS — R79.89 ELEVATED LIVER FUNCTION TESTS: ICD-10-CM

## 2022-10-11 DIAGNOSIS — I10 PRIMARY HYPERTENSION: ICD-10-CM

## 2022-10-11 LAB
A/G RATIO: 1.2 (ref 1.1–2.2)
ALBUMIN SERPL-MCNC: 3.9 G/DL (ref 3.4–5)
ALP BLD-CCNC: 66 U/L (ref 40–129)
ALT SERPL-CCNC: 25 U/L (ref 10–40)
ANION GAP SERPL CALCULATED.3IONS-SCNC: 16 MMOL/L (ref 3–16)
AST SERPL-CCNC: 20 U/L (ref 15–37)
BILIRUB SERPL-MCNC: 0.4 MG/DL (ref 0–1)
BUN BLDV-MCNC: 20 MG/DL (ref 7–20)
CALCIUM SERPL-MCNC: 9.5 MG/DL (ref 8.3–10.6)
CHLORIDE BLD-SCNC: 98 MMOL/L (ref 99–110)
CHOLESTEROL, TOTAL: 135 MG/DL (ref 0–199)
CO2: 24 MMOL/L (ref 21–32)
CREAT SERPL-MCNC: 0.7 MG/DL (ref 0.9–1.3)
ESTIMATED AVERAGE GLUCOSE: 148.5 MG/DL
GFR AFRICAN AMERICAN: >60
GFR NON-AFRICAN AMERICAN: >60
GLUCOSE BLD-MCNC: 181 MG/DL (ref 70–99)
HBA1C MFR BLD: 6.8 %
HDLC SERPL-MCNC: 61 MG/DL (ref 40–60)
LDL CHOLESTEROL CALCULATED: 60 MG/DL
POTASSIUM SERPL-SCNC: 4.1 MMOL/L (ref 3.5–5.1)
SODIUM BLD-SCNC: 138 MMOL/L (ref 136–145)
TOTAL PROTEIN: 7.1 G/DL (ref 6.4–8.2)
TRIGL SERPL-MCNC: 68 MG/DL (ref 0–150)
VLDLC SERPL CALC-MCNC: 14 MG/DL

## 2022-10-13 ENCOUNTER — HOSPITAL ENCOUNTER (OUTPATIENT)
Dept: PHYSICAL THERAPY | Age: 52
Setting detail: THERAPIES SERIES
Discharge: HOME | End: 2022-10-13
Payer: COMMERCIAL

## 2022-10-13 PROCEDURE — 97110 THERAPEUTIC EXERCISES: CPT | Performed by: PHYSICAL THERAPIST

## 2022-10-13 NOTE — PROGRESS NOTES
[facility] Outpatient Therapy  [address] [place] [address], [address], [address]  Phone: [phone]   Fax: [phone]    Physical Therapy Treatment Note/ Progress Report:     Date:  10/13/2022     Patient Name:  Lissette Garcia    :  1970  MRN: [id number]    Preferred Language for Healthcare:   [x]English       []other:    Referring Provider:  Jeanna Landon MD   Follow-up Visit Date:  unknown  Plan of care signed:    [x] Yes  [] No    Medical Diagnosis:  Radiculopathy, lumbar region [M54.16]    Treatment Diagnosis:  M54.16 - Low Back Pain with Radiculopathy     Insurance/Certification information:  James    Progress Report: [x]  Yes  []  No     Date Range for reporting period:  Beginnin2022  Ending:  10/13/2022    Progress report due (10 Rx/or 30 days whichever is less):     Recertification due (POC duration/ or 90 days whichever is less): 11/10/2022     Visit # Insurance Allowable Auth Needed    per year  8 approved through 22 [x]Yes   []No     SUBJECTIVE:  Pt c/o increased pain this morning. Reports setting up a visit with MD for tomorrow about pain meds. Pain level:  7/10 at rest     OBJECTIVE:    Observation:  Pt demonstrates increased speed/endurance on the NuStep. Test measurements:    TU sec, using standard walker. (10/13/22)    Functional Scale: FOTO Lumbar Spine   Score: 22/100  Date assessed: 2022      Functional Scale: FOTO Lumbar Spine   Score: 29/100  Date assessed: 10/5/2022      RESTRICTIONS/PRECAUTIONS:   Latex Allergy:  [x]NO      []YES    Exercises/Interventions: Exercises in bold performed in department today. Items not bolded are carried forward from prior visits for continuity of the record.     Exercise/Equipment Resistance/Repetitions HEP Other comments   NuStep 6 min  Seat @ 11  Arms @ 8  Resistance @ 5 [] 320 total steps   Seated Toe Taps 20 x bilat [x]    Seated Hip Flex/March 20 x R/L [x]    Seated Knee Ext 15 x R/L [x]    Seated Glute Set 15 x 3\" hold, Bilat [x]    Transverse Abdominus Iso 15 x 3\" hold [x]    Supine LTR 15 x Bilat [x]    Modified Chidi Stretch off side of table 5 x 10\" hold, Bilat [x]    Standing Hip Abduction 15 x R/L []    Standing Heel Raises  []    Seated Lumbar Rotation  []      []      []      []      []      []      []      []      []      []      Home Exercise Program:  Access Code: [email or web address]  URL: [employer]/  Date: 09/16/2022  Prepared by: Edwina Orona    Exercises  Hooklying Transversus Abdominis Palpation - 1 x daily - 7 x weekly - 10 reps  Supine Lower Trunk Rotation - 1 x daily - 7 x weekly - 10 reps  Modified Chidi Stretch - 1 x daily - 7 x weekly - 3-4 reps - 30 hold    Date: 09/28/2022  Prepared by: Grainger Lumary    Exercises  Seated March - 1 x daily - 7 x weekly - 10 reps  Seated Long Arc Quad - 1 x daily - 7 x weekly - 10 reps  Seated Gluteal Sets - 1 x daily - 7 x weekly - 10 reps  Seated Toe Taps - 1 x daily - 7 x weekly - 10 reps      Therapeutic Exercise: (40 min)  [x] (25261) Provided verbal/tactile cueing for activities related to strengthening, flexibility, endurance, ROM for improvements in LE, proximal hip, and core control with self-care, mobility, lifting, ambulation. Neuromuscular Facilitation: (5 min)  [x] (67200) Provided verbal/tactile cueing for activities related to improving balance, coordination, kinesthetic sense, posture, motor skill, proprioception to assist with LE, proximal hip, and core control in self-care, mobility, lifting, ambulation and eccentric single leg control. Therapeutic Activities:   [] (90465) Provided verbal/tactile cueing for activities related to improving balance, coordination, kinesthetic sense, posture, motor skill, proprioception and motor activation to allow for proper function of core, proximal hip and LE with self-care and ADLs and functional mobility.      Gait Training:   [] (94637) Provided training and instruction to the patient for proper LE, core and proximal hip recruitment and positioning and eccentric body weight control with ambulation re-education including up and down stairs     Home Exercise Program:    [x] (78107) Reviewed/Progressed HEP activities related to strengthening, flexibility, endurance, ROM of core, proximal hip and LE for functional self-care, mobility, lifting and ambulation/stair navigation   [x] (28855) Reviewed/Progressed HEP activities related to improving balance, coordination, kinesthetic sense, posture, motor skill, proprioception of core, proximal hip and LE for self-care, mobility, lifting, and ambulation/stair navigation      Manual Treatments:   [] (70057) Provided manual therapy to mobilize LE, proximal hip and/or LS spine soft tissue/joints for the purpose of modulating pain, promoting relaxation,  increasing ROM, reducing/eliminating soft tissue swelling/inflammation/restriction, improving soft tissue extensibility and allowing for proper ROM for normal function with self-care, mobility, lifting and ambulation. Modalities:    [] Electric Stimulation:   [] Ultrasound:   [] Other:       Charges:  Timed Code Treatment Minutes: 45 min   Total Treatment Minutes: 45 min        [] RE-EVAL     [x] KI(42631) x    3   [] NMR (60473) x       [] Manual (54823) x       [] TA (48698) x       [] Gait Training (77063) x       [] ES(attended) (29091)  [] ES (un) (37955)   [] Mech Traction (15913)  [] Ultrasound (83497)  [] Other:      GOALS:  Patient stated goal: \"Reduce Pain\"  [] Progressing: [] Met: [] Not Met: [] Adjusted     Therapist goals for Patient:   Short Term Goals: To be achieved in: 2 weeks  1. Independent in HEP and progression per patient tolerance, in order to prevent re-injury. [] Progressing: [x] Met: [] Not Met: [] Adjusted  2. Patient will have a decrease in pain to facilitate improvement in movement, function, and ADLs as indicated by Functional Deficits.   [x] Progressing: [] Met: [] Not Met: [] Adjusted     Long Term Goals: To be achieved in: 6 weeks  1. Disability index score of 40/100 or higher on the FOTO Lumbar Spine Assessment to assist with reaching prior level of function. [] Progressing: [] Met: [] Not Met: [] Adjusted  2. Patient will demonstrate increased Lumbar Spine AROM WNL and equal to non-involved side to allow for proper joint functioning as indicated by patients Functional Deficits. [] Progressing: [] Met: [] Not Met: [] Adjusted  3. Patient will demonstrate an increase in Strength to good proximal hip strength and control, to allow for proper functional mobility as indicated by patients Functional Deficits. [x] Progressing: [] Met: [] Not Met: [] Adjusted  4. Patient will perform TUG in 12 sec or better to reduce dependence on AD. [] Progressing: [] Met: [] Not Met: [] Adjusted    ASSESSMENT:  Pt is  47 Y. O  male, presenting with c/o  bilateral LE pain and low back pain. Assessment reveals deficits in strength, ROM, and increased pain. Pt demonstrating increased endurance, and improved FOTO score from 22/100 to 29/100. Pt notes improved functional mobility, but is still having significant pain. Pt will benefit from skilled PT to address these deficits and promote return to highest level of functional independence. Treatment/Activity Tolerance:  [x] Patient tolerated treatment well [] Patient limited by fatique  [] Patient limited by pain  [] Patient limited by other medical complications  [] Other:     Overall Progression Towards Functional goals/ Treatment Progress Update:  [] Patient is progressing as expected towards functional goals listed. [] Progression is slowed due to complexities/Impairments listed. [x] Progression has been slowed due to co-morbidities.   [] Plan just implemented, too soon to assess goals progression <30days   [] Goals require adjustment due to lack of progress  [] Patient is not progressing as expected and

## 2022-10-14 ENCOUNTER — OFFICE VISIT (OUTPATIENT)
Dept: FAMILY MEDICINE CLINIC | Age: 52
End: 2022-10-14
Payer: COMMERCIAL

## 2022-10-14 VITALS
DIASTOLIC BLOOD PRESSURE: 76 MMHG | HEIGHT: 71 IN | SYSTOLIC BLOOD PRESSURE: 128 MMHG | OXYGEN SATURATION: 98 % | TEMPERATURE: 97.9 F | HEART RATE: 80 BPM | BODY MASS INDEX: 44.1 KG/M2 | WEIGHT: 315 LBS

## 2022-10-14 DIAGNOSIS — M54.41 CHRONIC BILATERAL LOW BACK PAIN WITH BILATERAL SCIATICA: Primary | ICD-10-CM

## 2022-10-14 DIAGNOSIS — M54.42 CHRONIC BILATERAL LOW BACK PAIN WITH BILATERAL SCIATICA: Primary | ICD-10-CM

## 2022-10-14 DIAGNOSIS — G89.29 CHRONIC BILATERAL LOW BACK PAIN WITH BILATERAL SCIATICA: Primary | ICD-10-CM

## 2022-10-14 PROCEDURE — 99213 OFFICE O/P EST LOW 20 MIN: CPT | Performed by: FAMILY MEDICINE

## 2022-10-14 RX ORDER — NAPROXEN 500 MG/1
500 TABLET ORAL NIGHTLY
Qty: 60 TABLET | Refills: 0 | Status: SHIPPED | OUTPATIENT
Start: 2022-10-14 | End: 2022-12-13

## 2022-10-14 RX ORDER — GABAPENTIN 300 MG/1
CAPSULE ORAL
Qty: 150 CAPSULE | Refills: 5 | Status: SHIPPED | OUTPATIENT
Start: 2022-10-14 | End: 2023-04-12

## 2022-10-14 ASSESSMENT — PATIENT HEALTH QUESTIONNAIRE - PHQ9
1. LITTLE INTEREST OR PLEASURE IN DOING THINGS: 0
6. FEELING BAD ABOUT YOURSELF - OR THAT YOU ARE A FAILURE OR HAVE LET YOURSELF OR YOUR FAMILY DOWN: 0
5. POOR APPETITE OR OVEREATING: 0
SUM OF ALL RESPONSES TO PHQ QUESTIONS 1-9: 0
10. IF YOU CHECKED OFF ANY PROBLEMS, HOW DIFFICULT HAVE THESE PROBLEMS MADE IT FOR YOU TO DO YOUR WORK, TAKE CARE OF THINGS AT HOME, OR GET ALONG WITH OTHER PEOPLE: 0
SUM OF ALL RESPONSES TO PHQ9 QUESTIONS 1 & 2: 0
SUM OF ALL RESPONSES TO PHQ QUESTIONS 1-9: 0
2. FEELING DOWN, DEPRESSED OR HOPELESS: 0
3. TROUBLE FALLING OR STAYING ASLEEP: 0
SUM OF ALL RESPONSES TO PHQ QUESTIONS 1-9: 0
4. FEELING TIRED OR HAVING LITTLE ENERGY: 0
8. MOVING OR SPEAKING SO SLOWLY THAT OTHER PEOPLE COULD HAVE NOTICED. OR THE OPPOSITE, BEING SO FIGETY OR RESTLESS THAT YOU HAVE BEEN MOVING AROUND A LOT MORE THAN USUAL: 0
9. THOUGHTS THAT YOU WOULD BE BETTER OFF DEAD, OR OF HURTING YOURSELF: 0
7. TROUBLE CONCENTRATING ON THINGS, SUCH AS READING THE NEWSPAPER OR WATCHING TELEVISION: 0
SUM OF ALL RESPONSES TO PHQ QUESTIONS 1-9: 0

## 2022-10-14 NOTE — PROGRESS NOTES
Portions of this chart may have been created with voice recognition software. Occasional wrong-word or \"sound-alike\" substitutions may have occurred due to the inherent limitations of voice recognition software. Read the chart carefully and recognize, using context, where these substitutions have occurred        Chief Complaint     Pain (Lower back pain that interferes with sleep. )               SUBJECTIVE    Michelle [place] is a 47 y.o. male here for         1. Chronic bilateral low back pain with bilateral sciatica    - MRI LUMBAR SPINE WO CONTRAST; Future      This is a chronic problem. Patient initially had some left-sided sciatica however now the pain is continuing to be worse in both lower extremities radiating from the upper lumbar spinal area. He states that during the day he is able to be mobile more he is getting his therapy and exercises done and the pain is a little bit better controlled however at night when he tries to lay down to sleep the pain is really getting worse. He states the pain is so unbearable about 10 out of 10 that almost he wanted to go to the emergency room. He said it is been going on and the only thing he had to get some relief was over-the-counter Aleve to 440 mg. Worsening numbness tingling sensation in both lower extremities are worse at night. We will do an MRI at this time as well as refer to pain management/neurosurgery for further evaluation if needed. problem has been gradually worsening since onset. The pain is present in the lumbar spine and sacro-iliac. Pertinent negatives include no abdominal pain, bladder incontinence, bowel incontinence, chest pain,      dysuria, numbness, paresis, paresthesias, pelvic pain, perianal numbness, tingling or weakness. He has tried bed rest for the symptoms. REVIEW OF SYSTEMS:  Cardiovascular: Negative for chest pain. Gastrointestinal: Negative for abdominal pain and bowel incontinence.   Genitourinary: Negative for bladder incontinence, dysuria and pelvic pain. Musculoskeletal: Positive for back pain. Neurological: Negative for tingling, weakness, numbness and paresthesias. Lab Results   Component Value Date    WBC 8.6 03/30/2022    HGB 13.2 (L) 03/30/2022    HCT 39.3 (L) 03/30/2022    MCV 86.0 03/30/2022     03/30/2022      Lab Results   Component Value Date    LABA1C 6.8 10/11/2022     Lab Results   Component Value Date    .5 10/11/2022     Lab Results   Component Value Date    TSH 2.68 03/30/2022     Lab Results   Component Value Date    CHOL 135 10/11/2022     Lab Results   Component Value Date    TRIG 68 10/11/2022     Lab Results   Component Value Date    HDL 61 (H) 10/11/2022     Lab Results   Component Value Date    LDLCALC 60 10/11/2022     Lab Results   Component Value Date    LABVLDL 14 10/11/2022     No results found for: [facility]   Lab Results   Component Value Date     10/11/2022    K 4.1 10/11/2022    CL 98 (L) 10/11/2022    CO2 24 10/11/2022    BUN 20 10/11/2022    CREATININE 0.7 (L) 10/11/2022    GLUCOSE 181 (H) 10/11/2022    CALCIUM 9.5 10/11/2022    PROT 7.1 10/11/2022    LABALBU 3.9 10/11/2022    BILITOT 0.4 10/11/2022    ALKPHOS 66 10/11/2022    AST 20 10/11/2022    ALT 25 10/11/2022    LABGLOM >60 10/11/2022    GFRAA >60 10/11/2022    AGRATIO 1.2 10/11/2022    GLOB 2.9 05/05/2021           Current Outpatient Medications   Medication Sig Dispense Refill    gabapentin (NEURONTIN) 300 MG capsule Take 1 capsule by mouth every morning AND 2 capsules Daily with lunch AND 2 capsules nightly. Do all this for 180 days.  150 capsule 5    naproxen (NAPROSYN) 500 MG tablet Take 1 tablet by mouth nightly 60 tablet 0    metFORMIN (GLUCOPHAGE-XR) 500 MG extended release tablet TAKE 2 TABLETS BY MOUTH EVERY MORNING & AT BEDTIME 120 tablet 1    clonazePAM (KLONOPIN) 0.5 MG tablet TAKE ONE TABLET BY MOUTH TWICE A DAY AS NEEDED FOR ANXIETY 60 tablet 2    insulin NPH (HUMULIN N) 100 UNIT/ML injection vial Inject 40 Units into the skin in the morning and 40 Units in the evening. 30 mL 5    insulin regular (HUMULIN R) 100 UNIT/ML injection 18 units for breakfast, 18 units for lunch, 18 units for dinner, plus sliding scale 3 units for every 50 of blood glucose above 150 before every meal, total daily dose up to 80 units daily 20 mL 5    triamterene-hydroCHLOROthiazide (MAXZIDE-25) 37.5-25 MG per tablet Take 1 tablet by mouth daily 30 tablet 5    tamsulosin (FLOMAX) 0.4 mg capsule TAKE ONE CAPSULE BY MOUTH ONCE NIGHTLY 30 capsule 5    simvastatin (ZOCOR) 20 MG tablet TAKE ONE TABLET BY MOUTH EVERY EVENING 30 tablet 5    prazosin (MINIPRESS) 5 mg capsule Take 1 capsule by mouth nightly 30 capsule 5    lisinopril (PRINIVIL;ZESTRIL) 40 MG tablet TAKE ONE TABLET BY MOUTH DAILY 30 tablet 5    furosemide (LASIX) 40 MG tablet Use once a day every 2-3 days prn edema , wt gain >5 lbs in 1 week 60 tablet 3    cloNIDine (CATAPRES) 0.3 MG tablet Take 1 tablet by mouth twice daily 60 tablet 5    amLODIPine (NORVASC) 5 MG tablet TAKE ONE TABLET BY MOUTH DAILY 30 tablet 5    ferrous sulfate (IRON 325) 325 (65 Fe) MG tablet Take 1 tablet by mouth daily (with breakfast) 30 tablet 5    sertraline (ZOLOFT) 50 MG tablet Take 1 tablet by mouth daily 30 tablet 5    Insulin Pen Needle ([employer] PEN NEEDLES) 31G X 6 MM MISC 1 each by Does not apply route daily 100 each 3    Continuous Blood Gluc Sensor (FREESTYLE JIMBO 2 SENSOR) MISC 1 Device by Does not apply route every 14 days 6 each 5    diclofenac sodium (VOLTAREN) 1 % GEL Apply 4 g topically 4 times daily 150 g 1     No current facility-administered medications for this visit.        No Known Allergies      Past Medical History:   Diagnosis Date    Abdominal wall hernia 2/21/2012    Anemia 4/15/2013    Claustrophobia 4/15/2013    Depression 3/8/2011    Diabetes mellitus, type 2 ([facility] [address] 2/20/2012    Family history of premature coronary artery disease 6/19/2012    Hyperlipidemia 2012    Hypertension 3/8/2011    Insomnia 2012    Left atrial dilation 2012    Left ventricular hypertrophy 2012    Neuropathy     Obesity 3/8/2011    Obstructive sleep apnea 2012    uses Cpap sometimes    Osteoarthritis of subtalar joint 2016    Post traumatic stress disorder 4/15/2013    Right atrial dilation 2012    Right bundle branch block 5/15/2014    Right ventricular dilation 2012         Past Surgical History:   Procedure Laterality Date    CARDIOVASCULAR STRESS TEST  May 28 2014    Non-Imaging Stress Test    CIRCUMCISION  2017    Due to phimosis: Dr. Hodgson Chimera  2022    COLONOSCOPY POLYPECTOMY SNARE/COLD BIOPSY performed by Raheem Esparza MD at [address] Right 2016    ankle subtower fusion with bone graft using c-arm         Family History   Problem Relation Age of Onset    Heart Disease Mother     Coronary Art Dis Mother     Heart Attack Mother     Mental Illness Mother         Social History     Socioeconomic History    Marital status:      Spouse name: Ruslan Johnston    Number of children: 0    Years of education: None    Highest education level: None   Occupational History    Occupation:    Tobacco Use    Smoking status: Former     Packs/day: 0.25     Years: 5.00     Pack years: 1.25     Types: Cigarettes     Quit date: 2008     Years since quittin.7    Smokeless tobacco: Never    Tobacco comments:     quit smoking about    Vaping Use    Vaping Use: Never used   Substance and Sexual Activity    Alcohol use: No    Drug use: No    Sexual activity: Yes     Partners: Female     Comment:  - [facility]     Social Determinants of Health     Financial Resource Strain: High Risk    Difficulty of Paying Living Expenses: Very hard   Food Insecurity: Food Insecurity Present    Worried About [address] in the Last Year: Often true    Ran Out of Food in the Last Year: Often true          OBJECTIVE:      Vitals:    10/14/22 0831   BP: 128/76   Pulse: 80   Temp: 97.9 °F (36.6 °C)   SpO2: 98%   Weight: (!) 356 lb 9.6 oz (161.8 kg)   Height: 5' 11\" (1.803 m)         Constitutional: Patient is oriented to person, place, and time, appears well-developed and well-nourished. HENT:  HEENT:WNL   Neck: Normal range of motion. Neck supple. Cardiovascular: Normal rate, regular rhythm, normal heart sounds and intact distal pulses. Pulmonary/Chest: Effort normal and breath sounds normal.  Musculoskeletal:  Lumbar back: he exhibits decreased range of motion, tenderness, bony tenderness and spasm. Neurological: Alert and oriented to person, place, and time. he has normal reflexes. Skin: Skin is warm and dry. Psychiatric: Patient has a normal mood and affect, behavior is normal. Judgment and thought content normal.    ASSESSMENT AND PLAN    Kole Dsouza was seen today for pain. Diagnoses and all orders for this visit:    Chronic bilateral low back pain with bilateral sciatica  -     MRI LUMBAR SPINE [address]; Future    Other orders  -     gabapentin (NEURONTIN) 300 MG capsule; Take 1 capsule by mouth every morning AND 2 capsules Daily with lunch AND 2 capsules nightly. Do all this for 180 days.  -     naproxen (NAPROSYN) 500 MG tablet; Take 1 tablet by mouth nightly       Also conservative management with symptomatic relief strategies including ice, rest, topical analgesics, NSAIDs and ROM exercises       DISCHARGE MEDICATION LIST        Medication List            Accurate as of October 14, 2022  8:56 AM. If you have any questions, ask your nurse or doctor.                 START taking these medications      naproxen 500 MG tablet  Commonly known as: Naprosyn  Take 1 tablet by mouth nightly  Started by: Juventino Rico MD            CHANGE how you take these medications      gabapentin 300 MG capsule  Commonly known as: NEURONTIN  Take 1 capsule by mouth every morning AND 2 capsules Daily with lunch AND 2 capsules nightly. Do all this for 180 days. What changed: See the new instructions. Changed by: Rupesh Amos MD            CONTINUE taking these medications      amLODIPine 5 MG tablet  Commonly known as: NORVASC  TAKE ONE TABLET BY MOUTH DAILY     clonazePAM 0.5 MG tablet  Commonly known as: KLONOPIN  TAKE ONE TABLET BY MOUTH TWICE A DAY AS NEEDED FOR ANXIETY     cloNIDine 0.3 MG tablet  Commonly known as: CATAPRES  Take 1 tablet by mouth twice daily     diclofenac sodium 1 % Gel  Commonly known as: VOLTAREN  Apply 4 g topically 4 times daily     ferrous sulfate 325 (65 Fe) MG tablet  Commonly known as: IRON 325  Take 1 tablet by mouth daily (with breakfast)     FreeStyle Brice 2 Sensor Misc  1 Device by Does not apply route every 14 days     furosemide 40 MG tablet  Commonly known as: Lasix  Use once a day every 2-3 days prn edema , wt gain >5 lbs in 1 week     insulin  UNIT/ML injection vial  Commonly known as: HumuLIN N  Inject 40 Units into the skin in the morning and 40 Units in the evening.      insulin regular 100 UNIT/ML injection  Commonly known as: HumuLIN R  18 units for breakfast, 18 units for lunch, 18 units for dinner, plus sliding scale 3 units for every 50 of blood glucose above 150 before every meal, total daily dose up to 80 units daily     [employer] Pen Needles 31G X 6 MM Misc  Generic drug: Insulin Pen Needle  1 each by Does not apply route daily     lisinopril 40 MG tablet  Commonly known as: PRINIVIL;ZESTRIL  TAKE ONE TABLET BY MOUTH DAILY     metFORMIN 500 MG extended release tablet  Commonly known as: GLUCOPHAGE-XR  TAKE 2 TABLETS BY MOUTH EVERY MORNING & AT BEDTIME     prazosin 5 MG capsule  Commonly known as: MINIPRESS  Take 1 capsule by mouth nightly     sertraline 50 MG tablet  Commonly known as: ZOLOFT  Take 1 tablet by mouth daily     simvastatin 20 MG tablet  Commonly known as: ZOCOR  TAKE ONE TABLET BY MOUTH EVERY EVENING tamsulosin 0.4 MG capsule  Commonly known as: FLOMAX  TAKE ONE CAPSULE BY MOUTH ONCE NIGHTLY     triamterene-hydroCHLOROthiazide 37.5-25 MG per tablet  Commonly known as: MAXZIDE-25  Take 1 tablet by mouth daily               Where to Get Your Medications        These medications were sent to [address] [facility], [address] [facility]  [address] [facility], [address]      Phone: [phone]   gabapentin 300 MG capsule  naproxen 500 MG tablet          Return if symptoms worsen or fail to improve. Please refer to diagnosis, orders and patient instructions for further recommendations given. All patient's questions and concerns were addressed appropriately. All orders, handouts were reviewed in detail with the patient and patient voiced understanding verbally.

## 2022-10-17 ENCOUNTER — TELEPHONE (OUTPATIENT)
Dept: FAMILY MEDICINE CLINIC | Age: 52
End: 2022-10-17

## 2022-10-17 ENCOUNTER — HOSPITAL ENCOUNTER (OUTPATIENT)
Dept: PHYSICAL THERAPY | Age: 52
Setting detail: THERAPIES SERIES
Discharge: HOME | End: 2022-10-17
Payer: COMMERCIAL

## 2022-10-17 PROCEDURE — 97110 THERAPEUTIC EXERCISES: CPT | Performed by: PHYSICAL THERAPIST

## 2022-10-17 NOTE — FLOWSHEET NOTE
[facility]  [address], [address]  Phone: [phone]   Fax: [phone]    Physical Therapy Treatment Note/ Progress Report:     Date:  10/17/2022     Patient Name:  Moise Chaudhari    :  1970  MRN: [id number]    Preferred Language for Healthcare:   [x]English       []other:    Referring Provider:  Ruiz Iniguez MD   Follow-up Visit Date:  unknown  Plan of care signed:    [x] Yes  [] No    Medical Diagnosis:  Radiculopathy, lumbar region [M54.16]    Treatment Diagnosis:  M54.16 - Low Back Pain with Radiculopathy     Insurance/Certification information:  James    Progress Report: [x]  Yes  []  No     Date Range for reporting period:  Beginnin2022  Ending:  10/13/2022    Progress report due (10 Rx/or 30 days whichever is less):     Recertification due (POC duration/ or 90 days whichever is less): 11/10/2022     Visit # Insurance Allowable Auth Needed    per year  8 approved through 22 [x]Yes   []No     SUBJECTIVE:  Pt reports pain meds have been adjusted and is feeling much better today, and is sleeping better. Pain level:  5/10 at rest     OBJECTIVE:    Observation:  Pt demonstrates increased speed/endurance on the NuStep. Test measurements:    TU sec, using standard walker. (10/13/22)    Functional Scale: FOTO Lumbar Spine   Score: 22/100  Date assessed: 2022      Functional Scale: FOTO Lumbar Spine   Score: 29/100  Date assessed: 10/5/2022      RESTRICTIONS/PRECAUTIONS:   Latex Allergy:  [x]NO      []YES    Exercises/Interventions: Exercises in bold performed in department today. Items not bolded are carried forward from prior visits for continuity of the record.     Exercise/Equipment Resistance/Repetitions HEP Other comments   NuStep 6 min  Seat @ 11  Arms @ 8  Resistance @ 5 [] 375 total steps   Seated Toe Taps 20 x bilat [x]    Seated Hip Flex/March 20 x R/L [x]    Seated Knee Ext 15 x R/L [x]    Seated Glute Set 15 x 3\" hold, Bilat [x]    Transverse Abdominus Iso 15 x 3\" hold [x]    Supine LTR 15 x Bilat [x]    Modified Chidi Stretch off side of table 5 x 10\" hold, Bilat [x]    Standing Hip Abduction 15 x R/L []    Standing Heel Raises 15 x  []    Seated Lumbar Rotation  []    Sit-to-Stand 5 x  []      []      []      []      []      []      []      []      []      Home Exercise Program:  Access Code: [email or web address]  URL: [employer]/  Date: 09/16/2022  Prepared by: Ted Stagger    Exercises  Hooklying Transversus Abdominis Palpation - 1 x daily - 7 x weekly - 10 reps  Supine Lower Trunk Rotation - 1 x daily - 7 x weekly - 10 reps  Modified Chidi Stretch - 1 x daily - 7 x weekly - 3-4 reps - 30 hold    Date: 09/28/2022  Prepared by: Ted Stagger    Exercises  Seated March - 1 x daily - 7 x weekly - 10 reps  Seated Long Arc Quad - 1 x daily - 7 x weekly - 10 reps  Seated Gluteal Sets - 1 x daily - 7 x weekly - 10 reps  Seated Toe Taps - 1 x daily - 7 x weekly - 10 reps      Therapeutic Exercise: (38 min)  [x] (57196) Provided verbal/tactile cueing for activities related to strengthening, flexibility, endurance, ROM for improvements in LE, proximal hip, and core control with self-care, mobility, lifting, ambulation. Neuromuscular Facilitation: (4 min)  [x] (04485) Provided verbal/tactile cueing for activities related to improving balance, coordination, kinesthetic sense, posture, motor skill, proprioception to assist with LE, proximal hip, and core control in self-care, mobility, lifting, ambulation and eccentric single leg control. Therapeutic Activities:   [] (95700) Provided verbal/tactile cueing for activities related to improving balance, coordination, kinesthetic sense, posture, motor skill, proprioception and motor activation to allow for proper function of core, proximal hip and LE with self-care and ADLs and functional mobility.      Gait Training:   [] (57006) Provided training and instruction to the patient for proper LE, core and proximal hip recruitment and positioning and eccentric body weight control with ambulation re-education including up and down stairs     Home Exercise Program:    [x] (02903) Reviewed/Progressed HEP activities related to strengthening, flexibility, endurance, ROM of core, proximal hip and LE for functional self-care, mobility, lifting and ambulation/stair navigation   [x] (84912) Reviewed/Progressed HEP activities related to improving balance, coordination, kinesthetic sense, posture, motor skill, proprioception of core, proximal hip and LE for self-care, mobility, lifting, and ambulation/stair navigation      Manual Treatments:   [] (31285) Provided manual therapy to mobilize LE, proximal hip and/or LS spine soft tissue/joints for the purpose of modulating pain, promoting relaxation,  increasing ROM, reducing/eliminating soft tissue swelling/inflammation/restriction, improving soft tissue extensibility and allowing for proper ROM for normal function with self-care, mobility, lifting and ambulation. Modalities:    [] Electric Stimulation:   [] Ultrasound:   [] Other:       Charges:  Timed Code Treatment Minutes: 42 min   Total Treatment Minutes: 42 min        [] RE-EVAL     [x] YC(10017) x    3   [] NMR (93326) x       [] Manual (59813) x       [] TA (91345) x       [] Gait Training (84127) x       [] ES(attended) (60909)  [] ES (un) (52480)   [] Mech Traction (98900)  [] Ultrasound (50857)  [] Other:      GOALS:  Patient stated goal: \"Reduce Pain\"  [] Progressing: [] Met: [] Not Met: [] Adjusted     Therapist goals for Patient:   Short Term Goals: To be achieved in: 2 weeks  1. Independent in HEP and progression per patient tolerance, in order to prevent re-injury. [] Progressing: [x] Met: [] Not Met: [] Adjusted  2.  Patient will have a decrease in pain to facilitate improvement in movement, function, and ADLs as indicated by Functional Deficits. [x] Progressing: [] Met: [] Not Met: [] Adjusted     Long Term Goals: To be achieved in: 6 weeks  1. Disability index score of 40/100 or higher on the FOTO Lumbar Spine Assessment to assist with reaching prior level of function. [] Progressing: [] Met: [] Not Met: [] Adjusted  2. Patient will demonstrate increased Lumbar Spine AROM WNL and equal to non-involved side to allow for proper joint functioning as indicated by patients Functional Deficits. [] Progressing: [] Met: [] Not Met: [] Adjusted  3. Patient will demonstrate an increase in Strength to good proximal hip strength and control, to allow for proper functional mobility as indicated by patients Functional Deficits. [x] Progressing: [] Met: [] Not Met: [] Adjusted  4. Patient will perform TUG in 12 sec or better to reduce dependence on AD. [] Progressing: [] Met: [] Not Met: [] Adjusted    ASSESSMENT:  Pt is  47 Y. O  male, presenting with c/o  bilateral LE pain and low back pain. Assessment reveals deficits in strength, ROM, and increased pain. Pt demonstrating increased endurance, and improved FOTO score from 22/100 to 29/100. Pt notes improved functional mobility, but is still having significant pain. Pt will benefit from skilled PT to address these deficits and promote return to highest level of functional independence. Treatment/Activity Tolerance:  [x] Patient tolerated treatment well [] Patient limited by fatique  [] Patient limited by pain  [] Patient limited by other medical complications  [] Other:     Overall Progression Towards Functional goals/ Treatment Progress Update:  [] Patient is progressing as expected towards functional goals listed. [] Progression is slowed due to complexities/Impairments listed. [x] Progression has been slowed due to co-morbidities.   [] Plan just implemented, too soon to assess goals progression <30days   [] Goals require adjustment due to lack of progress  [] Patient is not progressing as expected and requires additional follow up with physician  [] Other    Prognosis for POC: [x] Good [] Fair  [] Poor    Patient requires continued skilled intervention: [x] Yes  [] No        PLAN: 2x per week for 6 weeks, through 10/26/2022 + 4 additional weeks (through 11/10/2022)  [x] Continue per plan of care [] Alter current plan (see comments)  [] Plan of care initiated [] Hold pending MD visit [] Discharge    Electronically signed by:  , PT [id number]   Ted Loving PT, PT, DPT    Note: If patient does not return for scheduled/recommended follow up visits, this note will serve as a discharge from care along with the most recent update on progress.

## 2022-10-19 ENCOUNTER — TELEPHONE (OUTPATIENT)
Dept: ORTHOPEDIC SURGERY | Age: 52
End: 2022-10-19

## 2022-10-19 NOTE — TELEPHONE ENCOUNTER
Imported all medical records ([employer]) into [facility] for JUAN MIGUEL Garza, & [facility]

## 2022-10-20 ENCOUNTER — HOSPITAL ENCOUNTER (OUTPATIENT)
Dept: MRI IMAGING | Age: 52
Discharge: HOME | End: 2022-10-20
Payer: COMMERCIAL

## 2022-10-20 DIAGNOSIS — M54.41 CHRONIC BILATERAL LOW BACK PAIN WITH BILATERAL SCIATICA: Primary | ICD-10-CM

## 2022-10-20 DIAGNOSIS — M54.42 CHRONIC BILATERAL LOW BACK PAIN WITH BILATERAL SCIATICA: ICD-10-CM

## 2022-10-20 DIAGNOSIS — M48.061 NEURAL FORAMINAL STENOSIS OF LUMBAR SPINE: ICD-10-CM

## 2022-10-20 DIAGNOSIS — M54.41 CHRONIC BILATERAL LOW BACK PAIN WITH BILATERAL SCIATICA: ICD-10-CM

## 2022-10-20 DIAGNOSIS — G89.29 CHRONIC BILATERAL LOW BACK PAIN WITH BILATERAL SCIATICA: ICD-10-CM

## 2022-10-20 DIAGNOSIS — M54.42 CHRONIC BILATERAL LOW BACK PAIN WITH BILATERAL SCIATICA: Primary | ICD-10-CM

## 2022-10-20 DIAGNOSIS — G89.29 CHRONIC BILATERAL LOW BACK PAIN WITH BILATERAL SCIATICA: Primary | ICD-10-CM

## 2022-10-20 PROCEDURE — 72148 MRI LUMBAR SPINE W/O DYE: CPT

## 2022-10-28 ENCOUNTER — HOSPITAL ENCOUNTER (EMERGENCY)
Age: 52
Discharge: HOME | End: 2022-10-28
Attending: EMERGENCY MEDICINE
Payer: COMMERCIAL

## 2022-10-28 VITALS
HEIGHT: 71 IN | OXYGEN SATURATION: 99 % | DIASTOLIC BLOOD PRESSURE: 75 MMHG | SYSTOLIC BLOOD PRESSURE: 159 MMHG | RESPIRATION RATE: 18 BRPM | TEMPERATURE: 98 F | BODY MASS INDEX: 44.1 KG/M2 | WEIGHT: 315 LBS | HEART RATE: 72 BPM

## 2022-10-28 DIAGNOSIS — M54.31 SCIATICA OF RIGHT SIDE: Primary | ICD-10-CM

## 2022-10-28 PROCEDURE — 6360000002 HC RX W HCPCS: Performed by: STUDENT IN AN ORGANIZED HEALTH CARE EDUCATION/TRAINING PROGRAM

## 2022-10-28 PROCEDURE — 6370000000 HC RX 637 (ALT 250 FOR IP): Performed by: STUDENT IN AN ORGANIZED HEALTH CARE EDUCATION/TRAINING PROGRAM

## 2022-10-28 PROCEDURE — 96372 THER/PROPH/DIAG INJ SC/IM: CPT

## 2022-10-28 PROCEDURE — 99284 EMERGENCY DEPT VISIT MOD MDM: CPT

## 2022-10-28 RX ORDER — METHOCARBAMOL 500 MG/1
1000 TABLET, FILM COATED ORAL ONCE
Status: COMPLETED | OUTPATIENT
Start: 2022-10-28 | End: 2022-10-28

## 2022-10-28 RX ORDER — OXYCODONE HYDROCHLORIDE 5 MG/1
5 TABLET ORAL ONCE
Status: COMPLETED | OUTPATIENT
Start: 2022-10-28 | End: 2022-10-28

## 2022-10-28 RX ORDER — KETOROLAC TROMETHAMINE 30 MG/ML
15 INJECTION, SOLUTION INTRAMUSCULAR; INTRAVENOUS ONCE
Status: COMPLETED | OUTPATIENT
Start: 2022-10-28 | End: 2022-10-28

## 2022-10-28 RX ADMIN — METHOCARBAMOL 1000 MG: 500 TABLET ORAL at 01:52

## 2022-10-28 RX ADMIN — KETOROLAC TROMETHAMINE 15 MG: 30 INJECTION, SOLUTION INTRAMUSCULAR; INTRAVENOUS at 03:15

## 2022-10-28 RX ADMIN — HYDROMORPHONE HYDROCHLORIDE 1 MG: 1 INJECTION, SOLUTION INTRAMUSCULAR; INTRAVENOUS; SUBCUTANEOUS at 03:16

## 2022-10-28 RX ADMIN — OXYCODONE 5 MG: 5 TABLET ORAL at 01:52

## 2022-10-28 SDOH — ECONOMIC STABILITY: FOOD INSECURITY: WITHIN THE PAST 12 MONTHS, THE FOOD YOU BOUGHT JUST DIDN'T LAST AND YOU DIDN'T HAVE MONEY TO GET MORE.: NEVER TRUE

## 2022-10-28 ASSESSMENT — PAIN DESCRIPTION - FREQUENCY: FREQUENCY: CONTINUOUS

## 2022-10-28 ASSESSMENT — PAIN DESCRIPTION - PAIN TYPE: TYPE: CHRONIC PAIN

## 2022-10-28 ASSESSMENT — PAIN DESCRIPTION - DESCRIPTORS
DESCRIPTORS: ACHING
DESCRIPTORS: STABBING
DESCRIPTORS: ACHING

## 2022-10-28 ASSESSMENT — PAIN DESCRIPTION - LOCATION
LOCATION: BACK

## 2022-10-28 ASSESSMENT — PAIN - FUNCTIONAL ASSESSMENT: PAIN_FUNCTIONAL_ASSESSMENT: 0-10

## 2022-10-28 ASSESSMENT — PAIN DESCRIPTION - ORIENTATION
ORIENTATION: RIGHT;LEFT
ORIENTATION: LEFT;RIGHT;LOWER

## 2022-10-28 ASSESSMENT — PAIN SCALES - GENERAL
PAINLEVEL_OUTOF10: 9
PAINLEVEL_OUTOF10: 10

## 2022-10-28 NOTE — DISCHARGE INSTRUCTIONS
You were seen in the emergency department for back pain. Your exam was reassuring and we treated you with multiple medications for your pain. It is very important that you stay active, lose weight, and follow-up with the pain specialty clinic that you have an appointment currently scheduled with. Call 911 or go to the nearest Emergency Department immediately for worsening symptoms, difficulty breathing, chest pain, lightheadedness, difficulty moving or talking, sensory loss, difficulty controlling your bowel or bladder function, altered level of consciousness, neck stiffness, uncontrollable nausea or vomiting, uncontrollable pain, inability to tolerate oral intake, fever, chills, severe bleeding, signs of infection (spreading redness, area feels very warm, swelling, pain, foul-smelling drainage), suicidal or homicidal ideation, or any other concerns. If we have prescribed you any new medications, please take them as prescribed and read the drug package insert carefully. Do not drink alcohol, drive, or operate machinery if you are taking narcotic pain medications. Your condition requires follow-up with your primary care provider, Betzaida Lopez MD, within 5 days, please see above for details. Bring these discharge instructions with you when you see your doctor. Avoid all strenuous activity until you have checked with your primary care provider.

## 2022-10-28 NOTE — ED PROVIDER NOTES
[address] [facility]          EM RESIDENT NOTE       Date of evaluation: 10/28/2022    Chief Complaint     Back Pain      of Present Illness     Zenobia Yeboah is a 47 y.o. male who presents to the emergency department for back pain. Patient states that he has a history of sciatica, takes gabapentin and Aleve at home which usually helps his pain. Patient states he had a flare, with right sided back pain rating down the back of his right leg earlier this evening without an inciting event. Patient tried gabapentin and Aleve without relief of symptoms and given the persistent pain he presented the ED for further evaluation. Patient denies any traumatic event, denies any saddle anesthesia, bowel/bladder incontinence, or numbness and tingling of his lower extremities preventing him from ambulation. Patient states this feels like similar episodes of his sciatic flare. Review of Systems     Denies any saddle anesthesia, bowel/bladder incontinence, or numbness and tingling of his lower extremities preventing him from ambulation. All other systems were reviewed and were negative. Past Medical, Surgical, Family, and Social History     He has a past medical history of Abdominal wall hernia, Anemia, Claustrophobia, Depression, Diabetes mellitus, type 2 ([address] Family history of premature coronary artery disease, Hyperlipidemia, Hypertension, Insomnia, Left atrial dilation, Left ventricular hypertrophy, Neuropathy, Obesity, Obstructive sleep apnea, Osteoarthritis of subtalar joint, Post traumatic stress disorder, Right atrial dilation, Right bundle branch block, and Right ventricular dilation. He has a past surgical history that includes knee surgery; Colonoscopy; cardiovascular stress test (May 28 2014); orthopedic surgery (Right, 2/1/2016); Circumcision (03/2017); and Colonoscopy (4/29/2022).   His family history includes Coronary Art Dis in his mother; Heart Attack in his mother; Heart Disease in his mother; Mental Illness in his mother. He reports that he quit smoking about 14 years ago. His smoking use included cigarettes. He has a 1.25 pack-year smoking history. He has never used smokeless tobacco. He reports that he does not drink alcohol and does not use drugs. Medications     Previous Medications    AMLODIPINE (NORVASC) 5 MG TABLET    TAKE ONE TABLET BY MOUTH DAILY    CLONAZEPAM (KLONOPIN) 0.5 MG TABLET    TAKE ONE TABLET BY MOUTH TWICE A DAY AS NEEDED FOR ANXIETY    CLONIDINE (CATAPRES) 0.3 MG TABLET    Take 1 tablet by mouth twice daily    CONTINUOUS BLOOD GLUC SENSOR (FREESTYLE JIMBO 2 SENSOR) MISC    1 Device by Does not apply route every 14 days    DICLOFENAC SODIUM (VOLTAREN) 1 % GEL    Apply 4 g topically 4 times daily    FERROUS SULFATE (IRON 325) 325 (65 FE) MG TABLET    Take 1 tablet by mouth daily (with breakfast)    FUROSEMIDE (LASIX) 40 MG TABLET    Use once a day every 2-3 days prn edema , wt gain >5 lbs in 1 week    GABAPENTIN (NEURONTIN) 300 MG CAPSULE    Take 1 capsule by mouth every morning AND 2 capsules Daily with lunch AND 2 capsules nightly. Do all this for 180 days. INSULIN NPH (HUMULIN N) 100 UNIT/ML INJECTION VIAL    Inject 40 Units into the skin in the morning and 40 Units in the evening.     INSULIN PEN NEEDLE ([employer] PEN NEEDLES) 31G X 6 MM MISC    1 each by Does not apply route daily    INSULIN REGULAR (HUMULIN R) 100 UNIT/ML INJECTION    18 units for breakfast, 18 units for lunch, 18 units for dinner, plus sliding scale 3 units for every 50 of blood glucose above 150 before every meal, total daily dose up to 80 units daily    LISINOPRIL (PRINIVIL;ZESTRIL) 40 MG TABLET    TAKE ONE TABLET BY MOUTH DAILY    METFORMIN (GLUCOPHAGE-XR) 500 MG EXTENDED RELEASE TABLET    TAKE 2 TABLETS BY MOUTH EVERY MORNING & AT BEDTIME    NAPROXEN (NAPROSYN) 500 MG TABLET    Take 1 tablet by mouth nightly    PRAZOSIN (MINIPRESS) 5 MG CAPSULE    Take 1 capsule by mouth nightly    SERTRALINE (ZOLOFT) 50 MG TABLET    Take 1 tablet by mouth daily    SIMVASTATIN (ZOCOR) 20 MG TABLET    TAKE ONE TABLET BY MOUTH EVERY EVENING    TAMSULOSIN (FLOMAX) 0.4 MG CAPSULE    TAKE ONE CAPSULE BY MOUTH ONCE NIGHTLY    TRIAMTERENE-HYDROCHLOROTHIAZIDE (MAXZIDE-25) 37.5-25 MG PER TABLET    Take 1 tablet by mouth daily       Allergies     He has No Known Allergies. Physical Exam     INITIAL VITALS: BP: (!) 159/75, Temp: 98 °F (36.7 °C), Heart Rate: 72, Resp: 18, SpO2: 99 %     General: Morbidly obese, chronically ill-appearing, sitting in the gurney, uncomfortable. Eyes:  PERRL. EOMI. No discharge from eyes   ENT:  No discharge from nose. OP clear  Neck:  Supple, trachea midline  Pulmonary:   Non-labored breathing. Breath sounds clear bilaterally  Cardiac:  Regular rate and rhythm. No murmurs/rubs/gallops  Abdomen:  Soft. Non-tender. Non-distended  Musculoskeletal:  No long bone deformity. Tenderness to palpation of the right lower back. Positive right straight leg test.  Vascular:  Extremities warm and perfused. Normal pulses in all 4 extremities  Skin:  Dry, no rashes  Extremities:  No peripheral edema  Neuro:  Alert and oriented x4. Moves all four extremities to command. No focal deficit    DiagnosticResults       RADIOLOGY:  No orders to display       LABS:   No results found for this visit on 10/28/22. RECENT VITALS:  BP: (!) 159/75, Temp: 98 °F (36.7 °C), Heart Rate: 72,Resp: 18, SpO2: 99 %         ED Course     Nursing Notes, Past Medical Hx, Past Surgical Hx, Social Hx, Allergies, and Family Hx were reviewed.     The patient was given the followingmedications:  Orders Placed This Encounter   Medications    methocarbamol (ROBAXIN) tablet 1,000 mg    oxyCODONE (ROXICODONE) immediate release tablet 5 mg    HYDROmorphone (DILAUDID) injection 1 mg    ketorolac (TORADOL) injection 15 mg       CONSULTS:  None    MEDICAL DECISION MAKING / ASSESSMENT / PLAN     Emily Ramos is a 47 y.o. male with previous history of sciatica who presents to the ED for low back pain rating down his right leg. Patient reports this is consistent with his prior sciatica, and on clinical exam he has reproducible tenderness with positive straight leg test, likely sciatica. Patient has no signs or symptoms suggest cauda equina syndrome. Patient was initially given Robaxin and oxycodone, with minimal improvement of his tenderness. He was additionally given Dilaudid and Toradol for his symptoms. These have yet to be administered. Plan to reassess patient's pain, and if improved he will be appropriate for discharge with follow-up with his primary care physician for today's visit. This patient was also evaluated by the attending physician. All care plans werediscussed and agreed upon. At this time I am going off-service and will be signing out care of this patient to my colleague Dr. Yaya Dominguez for further care. My colleague's responsibilities will include:    - reassess/dispo    Clinical Impression     1. Sciatica of right side        Disposition     PATIENT REFERRED TO:  No follow-up provider specified.     DISCHARGE MEDICATIONS:  New Prescriptions    No medications on file       DISPOSITION        Reema Wellington MD  10/28/22 0395

## 2022-10-28 NOTE — DISCHARGE INSTR - COC
Continuity of Care Form    Patient Name: Patrick Mcdaniels   :  1970  MRN:  [id number]    Admit date:  10/28/2022  Discharge date:  ***    Code Status Order: Prior   Advance Directives:     Admitting Physician:  No admitting provider for patient encounter. PCP: Bill Fernando MD    Discharging Nurse: [facility] Unit/Room#: A08/A08-08  Discharging Unit Phone Number: ***    Emergency Contact:   Extended Emergency Contact Information  Primary Emergency Contact: Laverne Vasquez  Address: [address] Phone: [phone]  Mobile Phone: [phone]  Relation: Spouse    Past Surgical History:  Past Surgical History:   Procedure Laterality Date    CARDIOVASCULAR STRESS TEST  May 28 2014    Non-Imaging Stress Test    CIRCUMCISION  2017    Due to phimosis: Dr. Jabari Shaver  2022    COLONOSCOPY POLYPECTOMY SNARE/COLD BIOPSY performed by Asberry Cushing, MD at [address] Right 2016    ankle subtower fusion with bone graft using c-arm       Immunization History:   Immunization History   Administered Date(s) Administered    COVID-19, PFIZER PURPLE top, DILUTE for use, (age 15 y+), 30mcg/0.3mL 2021, 2021, 2022    Influenza Virus Vaccine 2016    Influenza, AFLURIA (age 1 yrs+), FLUZONE, (age 10 mo+), MDV, 0.5mL 2019    Influenza, FLUCELVAX, (age 10 mo+), MDCK, PF, 0.5mL 2021, 2022    Zoster Recombinant (Shingrix) 2021       Active Problems:  Patient Active Problem List   Diagnosis Code    Hypertension I10    Moderate episode of recurrent major depressive disorder ([facility] [address] F33.1    Right leg pain M79.604    Eczema L30.9    Right lumbar radiculopathy M54.16    Urinary urgency R39.15    Obstructive sleep apnea G47.33    Memory loss R41. 3    Abdominal mass R19.00    Diabetes mellitus, type 2 (HCC) E11.9    Abdominal wall hernia K43.9    Hyperlipidemia E78.5    Chest pain R07.9    Left ventricular hypertrophy I51.7    Left atrial dilation I51.7    Right atrial dilation I51.7    Right ventricular dilation I51.7    Syncope R55    Family history of premature coronary artery disease Z82.49    Post traumatic stress disorder F43.10    Claustrophobia F40.240    Anemia D64.9    Right foot pain M79.671    Left foot pain M79.672    Right bundle branch block I45.10    Right ankle pain M25.571    BMI 50.0-59.9, adult ([facility]) Z68.43    Type 2 diabetes mellitus with hemoglobin A1c goal of 7.0%-8.0% ([facility]) E11.9    Ankle arthritis M19.079    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    Osteoarthritis of right subtalar joint M19.071    Tarsal coalitions Q66.89    Ankle abrasion with infection S90.519A, L08.9    Wound infection after surgery T81.49XA    Type 2 diabetes mellitus without complication ([facility]) K05.7    Class 3 severe obesity with body mass index (BMI) of 50.0 to 59.9 in adult ([facility] [address] E66.01, Z68.43    Insomnia due to mental disorder F51.05    RYAN (generalized anxiety disorder) F41.1    Primary osteoarthritis M19.91    Sciatica of left side M54.32    Type 2 diabetes mellitus, uncontrolled, with neuropathy MOS9785    Elevated liver function tests R79.89    Precordial pain R07.2       Isolation/Infection:   Isolation            No Isolation          Patient Infection Status       None to display            Nurse Assessment:  Last Vital Signs: There were no vitals taken for this visit.     Last documented pain score (0-10 scale):    Last Weight:   Wt Readings from Last 1 Encounters:   10/14/22 (!) 356 lb 9.6 oz (161.8 kg)     Mental Status:  {IP PT MENTAL STATUS:40985}    IV Access:  {[facility] IV ACCESS:890532709}    Nursing Mobility/ADLs:  Walking   {CHP DME DYYD:846601368}  Transfer  {CHP DME UPNZ:878113279}  Bathing  {CHP DME JWUX:364510930}  Dressing  {CHP DME LTZP:930769126}  Toileting  {CHP DME LEME:089831577}  Feeding  {CHP DME LMNO:322720291}  Med Admin  {CHP DME CBZX:168526215}  Med Delivery   { CLARISSA MED Delivery:860919321}    Wound Care Documentation and Therapy:        Elimination:  Continence: Bowel: {YES / IX:18180}  Bladder: {YES / LB:92974}  Urinary Catheter: {Urinary Catheter:917886876}   Colostomy/Ileostomy/Ileal Conduit: {YES / SN:10924}       Date of Last BM: ***  No intake or output data in the 24 hours ending 10/28/22 0132  No intake/output data recorded.     Safety Concerns:     [employer] Safety Concerns:650088396}    Impairments/Disabilities:      [employer] Impairments/Disabilities:871613393}    Nutrition Therapy:  Current Nutrition Therapy:   [employer] Diet List:169636107}    Routes of Feeding: {CHP DME Other Feedings:995529380}  Liquids: {Slp liquid thickness:21851}  Daily Fluid Restriction: {CHP DME Yes amt example:567453653}  Last Modified Barium Swallow with Video (Video Swallowing Test): {Done Not Done ZTGX:423263441}    Treatments at the Time of Hospital Discharge:   Respiratory Treatments: ***  Oxygen Therapy:  {Therapy; copd oxygen:75922}  Ventilator:    { CC Vent QYMB:081537101}    Rehab Therapies: {THERAPEUTIC INTERVENTION:8201087780}  Weight Bearing Status/Restrictions: [address] [employer]  Weight Bearin}  Other Medical Equipment (for information only, NOT a DME order):  {EQUIPMENT:573237657}  Other Treatments: ***    Patient's personal belongings (please select all that are sent with patient):  {CHP DME Belongings:597712708}    RN SIGNATURE:  {Esignature:667207952}    CASE MANAGEMENT/SOCIAL WORK SECTION    Inpatient Status Date: ***    Readmission Risk Assessment Score:  Readmission Risk              Risk of Unplanned Readmission:  0           Discharging to Facility/ Agency   Name:   Address:  Phone:  Fax:    Dialysis Facility (if applicable)   Name:  Address:  Dialysis Schedule:  Phone:  Fax:    / signature: {Esignature:513076569}    PHYSICIAN SECTION    Prognosis: {Prognosis:2775970048}    Condition at Discharge: Victoria Burch Patient Condition:747151494}    Rehab Potential (if transferring to Rehab): {Prognosis:4114054516}    Recommended Labs or Other Treatments After Discharge: ***    Physician Certification: I certify the above information and transfer of Phi Costello  is necessary for the continuing treatment of the diagnosis listed and that he requires {Admit to Appropriate Level of Care:98732} for {GREATER/LESS:827883843} 30 days.      Update Admission H&P: [facility]80[facility]}    PHYSICIAN SIGNATURE:  {Esignature:965525721}

## 2022-10-28 NOTE — ED PROVIDER NOTES
ED Attending Attestation Note     Date of evaluation: 10/28/2022    This patient was seen by the resident. I have seen and examined the patient, agree with the workup, evaluation, management and diagnosis. The care plan has been discussed. I was present for any procedures performed in the resident's  note and have made edits to the note where appropriate. My assessment reveals 47 y.o. male with history of obesity, chronic back pain, multiple other comorbidities presenting for low back pain with radiculopathy down the right leg. He describes symptoms consistent with sciatic radiculopathy as well as some possible chronic neuropathy in his feet. No weakness, fevers, incontinence, or other red flag findings. He has full strength in bilateral lower extremities and a positive straight leg raise on the right. Patient signed out to me by the off going resident at 0300 pending additional pain management. He received IM ketorolac and hydromorphone with good relief of symptoms. He was able to ambulate in the emergency department and was discharged in stable condition.         Melinda Clark MD  10/28/22 9085

## 2022-10-28 NOTE — ED NOTES
Patient presents to ED with complaints of back pain that radiates down bilateral lower extremities, patient has a history of chronic back pain, takes Gabapentin for this pain but it did not help with pain this evening.       Nely Hart RN  10/28/22 1496
0 = understands/communicates without difficulty

## 2022-10-31 ENCOUNTER — TELEMEDICINE (OUTPATIENT)
Dept: FAMILY MEDICINE CLINIC | Age: 52
End: 2022-10-31
Payer: COMMERCIAL

## 2022-10-31 DIAGNOSIS — M54.42 CHRONIC BILATERAL LOW BACK PAIN WITH BILATERAL SCIATICA: Primary | ICD-10-CM

## 2022-10-31 DIAGNOSIS — M54.41 CHRONIC BILATERAL LOW BACK PAIN WITH BILATERAL SCIATICA: Primary | ICD-10-CM

## 2022-10-31 DIAGNOSIS — G89.29 CHRONIC BILATERAL LOW BACK PAIN WITH BILATERAL SCIATICA: Primary | ICD-10-CM

## 2022-10-31 PROCEDURE — 99213 OFFICE O/P EST LOW 20 MIN: CPT | Performed by: FAMILY MEDICINE

## 2022-10-31 RX ORDER — PREDNISONE 20 MG/1
40 TABLET ORAL DAILY
Qty: 14 TABLET | Refills: 0 | Status: SHIPPED | OUTPATIENT
Start: 2022-10-31 | End: 2022-11-07

## 2022-10-31 NOTE — PROGRESS NOTES
10/31/2022    TELEHEALTH EVALUATION -- Audio/Visual (During [facility]- public health emergency)    HPI:    Phi Costello (:  1970) has requested an audio/video evaluation for the following concern(s):    Follow up from ED for back pain. Not improving, medications helping for some hours and not relieving pain. Awaiting Pain management appointment. No worsening symptoms since ER visit. No bowel or bladder incontinence or any worsening weakess    Plan- to continue all other medications plus steroid for acute symptoms. Pain management team contacted and requested sooner appointment for further management    Review of Systems    Pertinent positive and negative symptoms noted in HPI    Prior to Visit Medications    Medication Sig Taking? Authorizing Provider   predniSONE (DELTASONE) 20 MG tablet Take 2 tablets by mouth daily for 7 days Yes Nika Daniels MD   gabapentin (NEURONTIN) 300 MG capsule Take 1 capsule by mouth every morning AND 2 capsules Daily with lunch AND 2 capsules nightly. Do all this for 180 days. Yes Nika Daniels MD   naproxen (NAPROSYN) 500 MG tablet Take 1 tablet by mouth nightly Yes Nika Daniels MD   metFORMIN (GLUCOPHAGE-XR) 500 MG extended release tablet TAKE 2 TABLETS BY MOUTH EVERY MORNING & AT BEDTIME Yes Jocelin Bangura MD   clonazePAM (KLONOPIN) 0.5 MG tablet TAKE ONE TABLET BY MOUTH TWICE A DAY AS NEEDED FOR ANXIETY Yes Nika Daniels MD   insulin NPH (HUMULIN N) 100 UNIT/ML injection vial Inject 40 Units into the skin in the morning and 40 Units in the evening.  Yes Jocelin Bangura MD   insulin regular (HUMULIN R) 100 UNIT/ML injection 18 units for breakfast, 18 units for lunch, 18 units for dinner, plus sliding scale 3 units for every 50 of blood glucose above 150 before every meal, total daily dose up to 80 units daily Yes Jocelin Bangura MD   triamterene-hydroCHLOROthiazide (MAXZIDE-25) 37.5-25 MG per tablet Take 1 tablet by mouth daily Yes Nika Daniels MD   tamsulosin (FLOMAX) 0.4 mg capsule TAKE ONE CAPSULE BY MOUTH ONCE NIGHTLY Yes Isela Chavez MD   simvastatin (ZOCOR) 20 MG tablet TAKE ONE TABLET BY MOUTH EVERY EVENING Yes Isela Chavez MD   prazosin (MINIPRESS) 5 mg capsule Take 1 capsule by mouth nightly Yes Isela Chavez MD   lisinopril (PRINIVIL;ZESTRIL) 40 MG tablet TAKE ONE TABLET BY MOUTH DAILY Yes Isela Chavez MD   furosemide (LASIX) 40 MG tablet Use once a day every 2-3 days prn edema , wt gain >5 lbs in 1 week Yes Isela Chavez MD   cloNIDine (CATAPRES) 0.3 MG tablet Take 1 tablet by mouth twice daily Yes Isela Chavez MD   amLODIPine (NORVASC) 5 MG tablet TAKE ONE TABLET BY MOUTH DAILY Yes Isela Chavez MD   ferrous sulfate (IRON 325) 325 (65 Fe) MG tablet Take 1 tablet by mouth daily (with breakfast) Yes Isela Chavez MD   sertraline (ZOLOFT) 50 MG tablet Take 1 tablet by mouth daily Yes Isela Chavez MD   Insulin Pen Needle ([employer] PEN NEEDLES) 31G X 6 MM MISC 1 each by Does not apply route daily Yes VAMSI Britt CNP   Continuous Blood Gluc Sensor (FREESTYLE JIMBO 2 SENSOR) MISC 1 Device by Does not apply route every 14 days Yes VAMSI Britt CNP   diclofenac sodium (VOLTAREN) 1 % GEL Apply 4 g topically 4 times daily Yes Aliya Hager,        Social History     Tobacco Use    Smoking status: Former     Packs/day: 0.25     Years: 5.00     Pack years: 1.25     Types: Cigarettes     Quit date: 2008     Years since quittin.8    Smokeless tobacco: Never    Tobacco comments:     quit smoking about    Vaping Use    Vaping Use: Never used   Substance Use Topics    Alcohol use: No    Drug use:  No            PHYSICAL EXAMINATION:  [ INSTRUCTIONS:  \"[x]\" Indicates a positive item  \"[]\" Indicates a negative item  -- DELETE ALL ITEMS NOT EXAMINED]  Vital Signs: (As obtained by patient/caregiver or practitioner observation)    Blood pressure-  Heart rate-    Respiratory rate-    Temperature-  Pulse oximetry-     Constitutional: [x] Appears well-developed and well-nourished [x] No apparent distress      [] Abnormal-   Mental status  [x] Alert and awake  [x] Oriented to person/place/time [x]Able to follow commands      Eyes:  EOM      Normal  [] Abnormal-  Sclera  [x][x]  Normal  [] Abnormal -         Discharge []  None visible  [] Abnormal -    HENT:   [x] Normocephalic, atraumatic. [] Abnormal   [] Mouth/Throat: Mucous membranes are moist.     External Ears [x] Normal  [] Abnormal-     Neck: [x] No visualized mass     Pulmonary/Chest: [x] Respiratory effort normal.  [] No visualized signs of difficulty breathing or respiratory distress        [] Abnormal-      Musculoskeletal:   [] Normal gait with no signs of ataxia         [] Normal range of motion of neck        [] Abnormal-       Neurological:        [x] No Facial Asymmetry (Cranial nerve 7 motor function) (limited exam to video visit)          [] No gaze palsy        [] Abnormal-         Skin:        [] No significant exanthematous lesions or discoloration noted on facial skin         [] Abnormal-            Psychiatric:       [x] Normal Affect [x] No Hallucinations        [] Abnormal-     Other pertinent observable physical exam findings-     ASSESSMENT/PLAN:  Jeanmarie Archibald was seen today for follow-up from hospital.    Diagnoses and all orders for this visit:    Chronic bilateral low back pain with bilateral sciatica    Other orders  -     predniSONE (DELTASONE) 20 MG tablet; Take 2 tablets by mouth daily for 7 days         Return if symptoms worsen or fail to improve. [facility], was evaluated through a synchronous (real-time) audio-video encounter. The patient (or guardian if applicable) is aware that this is a billable service, which includes applicable co-pays. This Virtual Visit was conducted with patient's (and/or legal guardian's) consent.  The visit was conducted pursuant to the emergency declaration under the [facility] waiver authority and the Coronavirus Preparedness and Response Supplemental Appropriations Act. Patient identification was verified, and a caregiver was present when appropriate. The patient was located at Home: [address] [place] [address]. Provider was located at Office      Total time spent on this encounter: Not billed by time    --Abi Richards MD on 10/31/2022 at 10:47 AM    An electronic signature was used to authenticate this note.

## 2022-11-02 ENCOUNTER — OFFICE VISIT (OUTPATIENT)
Dept: ENDOCRINOLOGY | Age: 52
End: 2022-11-02
Payer: COMMERCIAL

## 2022-11-02 VITALS
HEART RATE: 88 BPM | DIASTOLIC BLOOD PRESSURE: 74 MMHG | HEIGHT: 71 IN | SYSTOLIC BLOOD PRESSURE: 134 MMHG | WEIGHT: 315 LBS | RESPIRATION RATE: 14 BRPM | TEMPERATURE: 98 F | BODY MASS INDEX: 44.1 KG/M2 | OXYGEN SATURATION: 99 %

## 2022-11-02 DIAGNOSIS — R79.89 ELEVATED LIVER FUNCTION TESTS: ICD-10-CM

## 2022-11-02 DIAGNOSIS — Z82.49 FAMILY HISTORY OF PREMATURE CORONARY ARTERY DISEASE: ICD-10-CM

## 2022-11-02 DIAGNOSIS — E11.40 TYPE 2 DIABETES, CONTROLLED, WITH NEUROPATHY (HCC): Primary | ICD-10-CM

## 2022-11-02 DIAGNOSIS — I10 PRIMARY HYPERTENSION: ICD-10-CM

## 2022-11-02 DIAGNOSIS — E78.2 MIXED HYPERLIPIDEMIA: ICD-10-CM

## 2022-11-02 DIAGNOSIS — E66.01 CLASS 3 SEVERE OBESITY WITH SERIOUS COMORBIDITY AND BODY MASS INDEX (BMI) OF 50.0 TO 59.9 IN ADULT, UNSPECIFIED OBESITY TYPE (HCC): ICD-10-CM

## 2022-11-02 PROBLEM — E11.65 POORLY CONTROLLED TYPE 2 DIABETES MELLITUS WITH NEUROPATHY (HCC): Status: ACTIVE | Noted: 2022-11-02

## 2022-11-02 PROCEDURE — 3078F DIAST BP <80 MM HG: CPT | Performed by: INTERNAL MEDICINE

## 2022-11-02 PROCEDURE — 3044F HG A1C LEVEL LT 7.0%: CPT | Performed by: INTERNAL MEDICINE

## 2022-11-02 PROCEDURE — 95251 CONT GLUC MNTR ANALYSIS I&R: CPT | Performed by: INTERNAL MEDICINE

## 2022-11-02 PROCEDURE — 3074F SYST BP LT 130 MM HG: CPT | Performed by: INTERNAL MEDICINE

## 2022-11-02 PROCEDURE — 99214 OFFICE O/P EST MOD 30 MIN: CPT | Performed by: INTERNAL MEDICINE

## 2022-11-02 NOTE — PROGRESS NOTES
Ghada Sauceda is a 47 y.o. male who is being evaluated for Type 2 diabetes mellitus. Current symptoms/problems include  hyperglycemia  and are worsening. Type 2 diabetes, controlled, with neuropathy ([address] [E11.40]    Diagnosed with Type 2 diabetes mellitus in 2019. Comorbid conditions:  HTN, hyperlipidemia and Neuropathy    Current diabetic medications include: Humulin N 40 units bid, 18 units for breakfast, 18 units for lunch, 18 units for dinner, plus sliding scale 3 units for every 50 of blood glucose above 150 before every meal, Metformin  mg 2 tabs bid  No family history of diabetes  Has arrhythmia    Intolerance to diabetes medications: No     Weight trend: stable  Prior visit with dietician: yes  Current diet: on average, 3 meals per day  Current exercise: no, has left leg pain     Current monitoring regimen: home blood tests - 4 times daily  Has brought blood glucose log/meter:  Yes  Home blood sugar records: fasting range: 180-200 and postprandial range: 160-200   Any episodes of hypoglycemia? No  Hypoglycemia frequency and time(s):    Does patient have Glucagon emergency kit? No  Does patient have rapid acting carbohydrate? No  Does patient wear a medic alert bracelet or necklace? No    2. Mixed hyperlipidemia  Has muscle pains, left leg    3. Class 3 severe obesity with serious comorbidity and body mass index (BMI) of 50.0 to 59.9 in adult, unspecified obesity type ([address]  Gained weight, now lost    4. Primary hypertension  Has headaches    5. Elevated liver function tests  No nausea, vomiting    6.  Family history of premature coronary artery disease  Mother  from sudden cardiac death at 48      Past Medical History:   Diagnosis Date    Abdominal wall hernia 2012    Anemia 4/15/2013    Claustrophobia 4/15/2013    Depression 3/8/2011    Diabetes mellitus, type 2 ([address] 2012    Family history of premature coronary artery disease 2012    Hyperlipidemia 2012 Hypertension 3/8/2011    Insomnia 2/17/2012    Left atrial dilation 6/19/2012    Left ventricular hypertrophy 6/19/2012    Neuropathy     Obesity 3/8/2011    Obstructive sleep apnea 2/17/2012    uses Cpap sometimes    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    Post traumatic stress disorder 4/15/2013    Right atrial dilation 6/19/2012    Right bundle branch block 5/15/2014    Right ventricular dilation 6/19/2012      Patient Active Problem List   Diagnosis    Hypertension    Moderate episode of recurrent major depressive disorder (HCC)    Right leg pain    Eczema    Right lumbar radiculopathy    Urinary urgency    Obstructive sleep apnea    Memory loss    Abdominal mass    Diabetes mellitus, type 2 (HCC)    Abdominal wall hernia    Hyperlipidemia    Chest pain    Left ventricular hypertrophy    Left atrial dilation    Right atrial dilation    Right ventricular dilation    Syncope    Family history of premature coronary artery disease    Post traumatic stress disorder    Claustrophobia    Anemia    Right foot pain    Left foot pain    Right bundle branch block    Right ankle pain    BMI 50.0-59.9, adult ([facility]    Type 2 diabetes mellitus with hemoglobin A1c goal of 7.0%-8.0% (HCC)    Ankle arthritis    Congenital tarsal coalition    Osteoarthritis of subtalar joint    Osteoarthritis of right subtalar joint    Tarsal coalitions    Ankle abrasion with infection    Wound infection after surgery    Type 2 diabetes mellitus without complication ([facility])    Class 3 severe obesity with body mass index (BMI) of 50.0 to 59.9 in adult ([address]    Insomnia due to mental disorder    RYAN (generalized anxiety disorder)    Primary osteoarthritis    Sciatica of left side    Type 2 diabetes mellitus, uncontrolled, with neuropathy    Elevated liver function tests    Precordial pain    Poorly controlled type 2 diabetes mellitus with neuropathy (HCC)    Type 2 diabetes, controlled, with neuropathy ([address]     Past Surgical History: Procedure Laterality Date    CARDIOVASCULAR STRESS TEST  May 28 2014    Non-Imaging Stress Test    CIRCUMCISION  2017    Due to phimosis: Dr. Barrientos Madeline  2022    COLONOSCOPY POLYPECTOMY SNARE/COLD BIOPSY performed by Jean Castillo MD at [address] 2016    ankle subtower fusion with bone graft using c-arm     Social History     Socioeconomic History    Marital status:      Spouse name: Charmaine Herbert    Number of children: 0    Years of education: Not on file    Highest education level: Not on file   Occupational History    Occupation:    Tobacco Use    Smoking status: Former     Packs/day: 0.25     Years: 5.00     Pack years: 1.25     Types: Cigarettes     Quit date: 2008     Years since quittin.8    Smokeless tobacco: Never    Tobacco comments:     quit smoking about    Vaping Use    Vaping Use: Never used   Substance and Sexual Activity    Alcohol use: No    Drug use: No    Sexual activity: Yes     Partners: Female     Comment:  - [address]   Other Topics Concern    Not on file   Social History Narrative    Not on file     Social Determinants of Health     Financial Resource Strain: High Risk    Difficulty of Paying Living Expenses: Very hard   Food Insecurity: Food Insecurity Present    Worried About Running Out of Food in the Last Year: Often true    Ran Out of Food in the Last Year: Often true   Transportation Needs: Not on file   Physical Activity: Not on file   Stress: Not on file   Social Connections: Not on file   Intimate Partner Violence: Not on file   Housing Stability: Not on file     Family History   Problem Relation Age of Onset    Heart Disease Mother     Coronary Art Dis Mother     Heart Attack Mother     Mental Illness Mother      Current Outpatient Medications   Medication Sig Dispense Refill    predniSONE (DELTASONE) 20 MG tablet Take 2 tablets by mouth daily for 7 days 14 tablet 0    gabapentin (NEURONTIN) 300 MG capsule Take 1 capsule by mouth every morning AND 2 capsules Daily with lunch AND 2 capsules nightly. Do all this for 180 days. 150 capsule 5    naproxen (NAPROSYN) 500 MG tablet Take 1 tablet by mouth nightly 60 tablet 0    metFORMIN (GLUCOPHAGE-XR) 500 MG extended release tablet TAKE 2 TABLETS BY MOUTH EVERY MORNING & AT BEDTIME 120 tablet 1    clonazePAM (KLONOPIN) 0.5 MG tablet TAKE ONE TABLET BY MOUTH TWICE A DAY AS NEEDED FOR ANXIETY 60 tablet 2    insulin NPH (HUMULIN N) 100 UNIT/ML injection vial Inject 40 Units into the skin in the morning and 40 Units in the evening.  30 mL 5    insulin regular (HUMULIN R) 100 UNIT/ML injection 18 units for breakfast, 18 units for lunch, 18 units for dinner, plus sliding scale 3 units for every 50 of blood glucose above 150 before every meal, total daily dose up to 80 units daily 20 mL 5    triamterene-hydroCHLOROthiazide (MAXZIDE-25) 37.5-25 MG per tablet Take 1 tablet by mouth daily 30 tablet 5    tamsulosin (FLOMAX) 0.4 mg capsule TAKE ONE CAPSULE BY MOUTH ONCE NIGHTLY 30 capsule 5    simvastatin (ZOCOR) 20 MG tablet TAKE ONE TABLET BY MOUTH EVERY EVENING 30 tablet 5    prazosin (MINIPRESS) 5 mg capsule Take 1 capsule by mouth nightly 30 capsule 5    lisinopril (PRINIVIL;ZESTRIL) 40 MG tablet TAKE ONE TABLET BY MOUTH DAILY 30 tablet 5    furosemide (LASIX) 40 MG tablet Use once a day every 2-3 days prn edema , wt gain >5 lbs in 1 week 60 tablet 3    cloNIDine (CATAPRES) 0.3 MG tablet Take 1 tablet by mouth twice daily 60 tablet 5    amLODIPine (NORVASC) 5 MG tablet TAKE ONE TABLET BY MOUTH DAILY 30 tablet 5    ferrous sulfate (IRON 325) 325 (65 Fe) MG tablet Take 1 tablet by mouth daily (with breakfast) 30 tablet 5    sertraline (ZOLOFT) 50 MG tablet Take 1 tablet by mouth daily 30 tablet 5    Insulin Pen Needle ([employer] PEN NEEDLES) 31G X 6 MM MISC 1 each by Does not apply route daily 100 each 3    Continuous Blood Gluc Sensor (FREESTYLE JIMBO 2 SENSOR) MISC 1 Device by Does not apply route every 14 days 6 each 5    diclofenac sodium (VOLTAREN) 1 % GEL Apply 4 g topically 4 times daily 150 g 1     No current facility-administered medications for this visit.      No Known Allergies  Family Status   Relation Name Status    Mother   at age 48    Father   at age 68       Lab Review:    Lab Results   Component Value Date/Time    WBC 8.6 2022 04:15 PM    HGB 13.2 2022 04:15 PM    HCT 39.3 2022 04:15 PM    MCV 86.0 2022 04:15 PM     2022 04:15 PM     Lab Results   Component Value Date/Time     10/11/2022 08:59 AM    K 4.1 10/11/2022 08:59 AM    CL 98 10/11/2022 08:59 AM    CO2 24 10/11/2022 08:59 AM    BUN 20 10/11/2022 08:59 AM    CREATININE 0.7 10/11/2022 08:59 AM    GLUCOSE 181 10/11/2022 08:59 AM    CALCIUM 9.5 10/11/2022 08:59 AM    PROT 7.1 10/11/2022 08:59 AM    PROT 6.4 2016 12:00 AM    LABALBU 3.9 10/11/2022 08:59 AM    BILITOT 0.4 10/11/2022 08:59 AM    ALKPHOS 66 10/11/2022 08:59 AM    AST 20 10/11/2022 08:59 AM    ALT 25 10/11/2022 08:59 AM    LABGLOM >60 10/11/2022 08:59 AM    GFRAA >60 10/11/2022 08:59 AM    GFRAA >60 2013 10:45 AM    AGRATIO 1.2 10/11/2022 08:59 AM    GLOB 2.9 2021 07:50 AM     Lab Results   Component Value Date/Time    TSH 2.68 2022 04:15 PM     Lab Results   Component Value Date/Time    LABA1C 6.8 10/11/2022 08:59 AM     Lab Results   Component Value Date/Time    .5 10/11/2022 08:59 AM     Lab Results   Component Value Date/Time    CHOL 135 10/11/2022 08:59 AM     Lab Results   Component Value Date/Time    TRIG 68 10/11/2022 08:59 AM     Lab Results   Component Value Date/Time    HDL 61 10/11/2022 08:59 AM    HDL 62 2012 01:52 PM     Lab Results   Component Value Date/Time    LDLCALC 60 10/11/2022 08:59 AM     Lab Results   Component Value Date/Time    LABVLDL 14 10/11/2022 08:59 AM     No results found for: [facility]  Lab Results   Component Value Date/Time    LABMICR <1.20 07/11/2022 07:39 AM    LABMICR SEE BELOW 08/16/2016 06:36 PM     No results found for: VITD25     Review of Systems:  Constitutional: has fatigue, no fever, no recent weight gain, no recent weight loss, no changes in appetite  Eyes: no eye pain, no change in vision, no eye redness, no eye irritation, no double vision  Ears, nose, throat: no nasal congestion, no sore throat, no earache, no decrease in hearing, no hoarseness, no dry mouth, no sinus problems, no difficulty swallowing, no neck lumps, no dental problems, no mouth sores, no ringing in ears  Pulmonary:  has shortness of breath, no wheezing, has dyspnea on exertion, no cough  Cardiovascular: has chest pain, has lower extremity edema, no orthopnea, no intermittent leg claudication, has palpitations  Gastrointestinal: no abdominal pain, no nausea, no vomiting, no diarrhea, no constipation, no dysphagia, no heartburn, no bloating  Genitourinary: no dysuria, no urinary incontinence, no urinary hesitancy, has urinary frequency, no feelings of urinary urgency, has nocturia  Musculoskeletal: no joint swelling, no joint stiffness, has joint pain, no muscle cramps, no muscle pain  Integument/Breast: has skin rashes, no skin lesions, no itching, no dry skin  Neurological: has numbness, has tingling, no weakness, no confusion, has headaches, has dizziness, no fainting, no tremors, has decrease in memory, has balance problems  Psychiatric: has anxiety, has depression, has insomnia  Hematologic/Lymphatic: no tendency for easy bleeding, no swollen lymph nodes, has tendency for easy bruising  Immunology: no seasonal allergies, no frequent infections, no frequent illnesses  Endocrine: has temperature intolerance    /74   Pulse 88   Temp 98 °F (36.7 °C)   Resp 14   Ht 5' 11\" (1.803 m)   Wt (!) 370 lb (167.8 kg)   SpO2 99%   BMI 51.60 kg/m²    Wt Readings from Last 3 Encounters: 11/02/22 (!) 370 lb (167.8 kg)   10/28/22 (!) 360 lb (163.3 kg)   10/14/22 (!) 356 lb 9.6 oz (161.8 kg)     Body mass index is 51.6 kg/m². OBJECTIVE:  Constitutional: no acute distress, well appearing and well nourished  Psychiatric: oriented to person, place and time, judgement and insight and normal, recent and remote memory and intact and mood and affect are normal  Skin: skin and subcutaneous tissue is normal without mass, normal turgor  Head and Face: examination of head and face revealed no abnormalities  Eyes: no lid or conjunctival swelling, erythema or discharge, pupils are normal, equal, round, reactive to light  Ears/Nose: external inspection of ears and nose revealed no abnormalities, hearing is grossly normal  Oropharynx/Mouth/Face: lips, tongue and gums are normal with no lesions, the voice quality was normal  Neck: neck is supple and symmetric, with midline trachea and no masses, thyroid is normal  Lymphatics: normal cervical lymph nodes, normal supraclavicular nodes  Pulmonary: no increased work of breathing or signs of respiratory distress, lungs are clear to auscultation  Cardiovascular: normal heart rate and rhythm, normal S1 and S2, no murmurs and pedal pulses and 2+ bilaterally, 1+ edema  Abdomen: abdomen is soft, non-tender with no masses  Musculoskeletal: normal gait and station and exam of the digits and nails are normal  Neurological: normal coordination and normal general cortical function    Visual inspection:  Deformity/amputation: absent  Skin lesions/pre-ulcerative calluses: absent  Edema: right- 1+, left- 1+    Sensory exam:  Monofilament sensation: normal  (minimum of 5 random plantar locations tested, avoiding callused areas - > 1 area with absence of sensation is + for neuropathy)    Plus at least one of the following:  Pulses: normal  Proprioception: Intact  Vibration (128 Hz): Impaired    ASSESSMENT/PLAN:  1.  Type 2 diabetes mellitus, poorly controlled, with neuropathy [facility])  Will try Brice 3 samples. Hemoglobin A1c 15-12.4-12.7-12.8-9.1-6.8  Counseled patient about pathophysiology of diabetes, basal prandial insulin requirements, insulin adjustments, correction scale insulin. Had Diabetes education   Continue metformin  mg 2 tablets twice a day  Humulin N 40 units twice a day  Humulin R 18 units for breakfast, 18 units for lunch, 18 units for dinner, plus sliding scale 3 units for every 50 of blood glucose above 150 before every meal  - Hemoglobin A1C; Future  - Comprehensive Metabolic Panel; Future  - Lipid Panel; Future  - Microalbumin / Creatinine Urine Ratio; Future    2. Mixed hyperlipidemia  LDL 87-60  Continue simvastatin 20 mg daily  - Lipid Panel; Future    3. Class 3 severe obesity with serious comorbidity and body mass index (BMI) of 50.0 to 59.9 in adult, unspecified obesity type (HCC)  Eat healthy, activity as tolerated    4. Primary hypertension  Continue current medications  - Comprehensive Metabolic Panel; Future    5. Elevated liver function tests  -59-38  AST 59-42-21  Follow closely  - Comprehensive Metabolic Panel; Future    6.  Family history of premature coronary artery disease  Mother  from sudden cardiac death at 48  Patient has cardiology consultation scheduled    Reviewed and/or ordered clinical lab results Yes  Reviewed and/or ordered radiology tests Yes  Reviewed and/or ordered other diagnostic tests No  Discussed test results with performing physician No  Independently reviewed image, tracing, or specimen No  Made a decision to obtain old records No  Reviewed and summarized old records Yes   TSH 2.68  ALT 59  AST 42  Hemoglobin A1c 12.8  LDL 77  HDL 58  Triglycerides 84  Obtained history from other than patient No    Bubba Stern was counseled regarding symptoms of diabetes diagnosis, course and complications of disease if inadequately treated, side effects of medications, diagnosis, treatment options, and prognosis, risks, benefits, complications, and alternatives of treatment, labs, imaging and other studies and treatment targets and goals, diabetes pathophysiology, basal and prandial insulin requirements, medications available for diabetes treatment, insulin adjustments, correction scale of insulin. He understands instructions and counseling. These diagnosis were discussed and reviewed with the patient including the advantages of drug therapy. He was counseled at this visit on the following: diabetes complication prevention and foot care. CGMS Download Review and Recommendations  See scanned document for blood glucose tracing documentation  [employer] personal CGMS data downloaded and reviewed. This was separate service provided-CGM interpretation    Average glucose 179 ±27.6 SD  Time in range: 55 %  Time above 180: 45%  Time under 70: 0  %     Basal pattern review: Basal hyperglycemia  Postprandial pattern review: Postprandial hyperglycemia, especially after lunch and dinner  Hypoglycemia review: No hypoglycemia  Activity related review: Not reported      Based on the data, I recommend:    1. Adjust NPH insulin    2. Adjust Regular insulin     3. Healthier diet choices, portion control    4. Do timely bolus injections, do not underestimate carbohydrates       Return in about 3 months (around 2/2/2023) for diabetes.     Electronically signed by Mariana Guerrero MD on 11/2/2022 at 3:30 PM

## 2022-11-16 RX ORDER — METFORMIN HYDROCHLORIDE 500 MG/1
TABLET, EXTENDED RELEASE ORAL
Qty: 120 TABLET | Refills: 2 | Status: SHIPPED | OUTPATIENT
Start: 2022-11-16

## 2022-11-17 ENCOUNTER — OFFICE VISIT (OUTPATIENT)
Dept: PAIN MANAGEMENT | Age: 52
End: 2022-11-17
Payer: COMMERCIAL

## 2022-11-17 VITALS
OXYGEN SATURATION: 98 % | SYSTOLIC BLOOD PRESSURE: 138 MMHG | TEMPERATURE: 97 F | WEIGHT: 315 LBS | HEIGHT: 71 IN | BODY MASS INDEX: 44.1 KG/M2 | HEART RATE: 71 BPM | DIASTOLIC BLOOD PRESSURE: 75 MMHG

## 2022-11-17 DIAGNOSIS — G89.29 CHRONIC MIDLINE LOW BACK PAIN WITH SCIATICA, SCIATICA LATERALITY UNSPECIFIED: ICD-10-CM

## 2022-11-17 DIAGNOSIS — M48.061 FORAMINAL STENOSIS OF LUMBAR REGION: ICD-10-CM

## 2022-11-17 DIAGNOSIS — M19.071 OSTEOARTHRITIS OF RIGHT SUBTALAR JOINT: ICD-10-CM

## 2022-11-17 DIAGNOSIS — M51.36 DDD (DEGENERATIVE DISC DISEASE), LUMBAR: ICD-10-CM

## 2022-11-17 DIAGNOSIS — F33.1 MODERATE EPISODE OF RECURRENT MAJOR DEPRESSIVE DISORDER (HCC): ICD-10-CM

## 2022-11-17 DIAGNOSIS — G62.9 NEUROPATHY: ICD-10-CM

## 2022-11-17 DIAGNOSIS — F41.1 GAD (GENERALIZED ANXIETY DISORDER): ICD-10-CM

## 2022-11-17 DIAGNOSIS — M47.816 FACET ARTHROPATHY, LUMBAR: ICD-10-CM

## 2022-11-17 DIAGNOSIS — G89.4 CHRONIC PAIN SYNDROME: ICD-10-CM

## 2022-11-17 DIAGNOSIS — M54.40 CHRONIC MIDLINE LOW BACK PAIN WITH SCIATICA, SCIATICA LATERALITY UNSPECIFIED: ICD-10-CM

## 2022-11-17 DIAGNOSIS — E66.01 CLASS 3 SEVERE OBESITY DUE TO EXCESS CALORIES WITHOUT SERIOUS COMORBIDITY WITH BODY MASS INDEX (BMI) OF 50.0 TO 59.9 IN ADULT (HCC): ICD-10-CM

## 2022-11-17 PROCEDURE — 3078F DIAST BP <80 MM HG: CPT | Performed by: NURSE PRACTITIONER

## 2022-11-17 PROCEDURE — 99244 OFF/OP CNSLTJ NEW/EST MOD 40: CPT | Performed by: NURSE PRACTITIONER

## 2022-11-17 PROCEDURE — 3074F SYST BP LT 130 MM HG: CPT | Performed by: NURSE PRACTITIONER

## 2022-11-17 RX ORDER — DICLOFENAC EPOLAMINE 0.01 G/1
1 SYSTEM TOPICAL DAILY
Qty: 30 PATCH | Refills: 0 | Status: SHIPPED | OUTPATIENT
Start: 2022-11-17 | End: 2022-12-17

## 2022-11-17 NOTE — PROGRESS NOTES
HISTORY OF PRESENT ILLNESS:  Mr. Lennox Olivo is a 47 y.o. male presents for consultation at the kind request of Dr. Alejandra Le his primary care physician for chronic pain management. His presenting problems are pain in the lower back radiating to the groin, bilateral thigh/feet numbness/tingling, burning. He has also been evaluated by endocrinology for DM2, psychiatry for depression/anxiety, cardiology for HTN/HLD. Onset of pain began 4 months ago spontaneously with gradual worsening. Endorses having worked in [employer] for many years in the past which may have contributed to the pain. Denies direct trauma or injury. Has not had injections or procedures to the spine. Last had injections to the left shoulder during an emergency room visit. Opioids were primarily prescribed through his PCP initially with Dr. Juan Dent, then lastly with Dr. Lorena Keys. Last had tramadol 50 mg tablets on 8/2/2020 2 x 7 days with moderate pain relief. Admits to weight gain in the past due to activity intolerance, reports having lost at least 60 pounds this year through diet modification. Was previously treated by Dr. Chon Littlejohn for right anfkle/foot pain, h/o foot fusion. Has a history of right foot surgery which may contribute to the pain in the feet. Denies having been treated in pain management in the past. Other health conditions include JUAN, Heart disease. Denies having had PT int he last year. He rates the pain in the 7/10 int he lower back/legs. She describes it as aching, burning, pressure, numbness. Pain is greaterin his lower back than legs. Pain is made worse by: walking, standing, sitting, bending, lifting, going up/down the stairs, putting shoes/socks on. Activities that have been limited by pain that he otherwise tolerated well are daily activities. Alternative therapies he has previously attempted are pain medicine, antiinflammatories, muscle relaxer's, walker. Current treatment regimen has helped relieve about 0% of the pain. Relieving factors of pain include pain medicine, antiinflammatories, muscle relaxer's. Carroles side effects from the current pain regimen. In the last week she reports having extreme bothersome symptoms all the time with numbness/tingling tot he feet. Pain prevents him from lifting anything, walking more than 10 minutes, sitting more than 1 hour, standing more than a few minutes. His social/recreational life is restricted by pain. His sexual life is nearly absent due to pain. Patient reports mood is depressed and that she has no suicidal/homicidal inclination. reports having a history of depression/anxiety as well as PTSD due to history of having been in the war while living in [facility] his [place]. He is currently on clonazepam 0.5 mg tablets twice a day as well as Zoloft 50 mg, prazosin 5 mg nightly. He also continues to maintain psychological counseling which has greatly helped stabilize his mood. Sleep patterns are  poor with an average of 4 hours . Patient denies neurological bowel or bladder concerns. Patient denies misusing/abusing his narcotic pain medications or using any illegal drugs. he admits to morning stiffness, fatigue, and headaches. Currently working on disability, lives with his wife who is currently supporting them financially. Denies smoking cigarettes or alcohol consumption. ROS:  The patient's social history, past medical history, family history, medications, allergies and review of systems have all been reviewed and verified from  the patient questionnaire form which has been filled by the patient. The  allergies, medication list  and past medical and surgical history and other information recorded by the MA was again reviewed today  These forms have been scanned into the \"media\" tab of patients electronic medical record.  Please check page 6 of the patient questionnaire for for family history     PHYSICAL EXAM:  Please see the physical exam form for a detailed examination on this visit. This form has been completed and scanned into the  Media section of the chart     Physical Exam  Constitutional:       General: He is not in acute distress. Appearance: He is well-developed. HENT:      Head: Normocephalic and atraumatic. Nose: Nose normal.   Eyes:      Conjunctiva/sclera: Conjunctivae normal.      Pupils: Pupils are equal, round, and reactive to light. Neck:      Thyroid: No thyromegaly. Cardiovascular:      Rate and Rhythm: Normal rate and regular rhythm. Heart sounds: Normal heart sounds. Pulmonary:      Effort: Pulmonary effort is normal. No respiratory distress. Breath sounds: Normal breath sounds. Abdominal:      General: Bowel sounds are normal. There is no distension. Palpations: Abdomen is soft. Tenderness: There is no abdominal tenderness. There is no right CVA tenderness or left CVA tenderness. Musculoskeletal:         General: Tenderness present. Right shoulder: Tenderness (tenderness with palpation of bilateral shoulders) present. Left shoulder: Tenderness present. Cervical back: Normal range of motion and neck supple. No tenderness. Thoracic back: Tenderness present. Lumbar back: Tenderness (pain noted with 30 degree flexion, 5 degree extention, palpation) present. No swelling or deformity. Decreased range of motion. Right hip: Tenderness (pain to the hip/lumbar region with palpation, 45 degree flexion) present. Decreased range of motion. Left hip: Tenderness present. Decreased range of motion. Right upper leg: Tenderness (tenderness to both thighs) present. Left upper leg: Tenderness present. Right knee: Tenderness present. Left knee: Tenderness present. Right lower leg: Edema (+2 pitting edema BLE) present. Left lower leg: Edema (+2 pitting edema BLE) present. Right foot: Tenderness present. Lymphadenopathy:      Cervical: No cervical adenopathy.    Skin: General: Skin is warm and dry. Findings: No rash. Nails: There is no clubbing. Neurological:      Mental Status: He is alert and oriented to person, place, and time. Cranial Nerves: No cranial nerve deficit. Sensory: No sensory deficit. Motor: Motor function is intact. Gait: Gait abnormal (steady gait with the aid of a walker). Deep Tendon Reflexes:      Reflex Scores:       Patellar reflexes are 1+ on the right side and 1+ on the left side. Achilles reflexes are 2+ on the right side and 2+ on the left side. Psychiatric:         Mood and Affect: Mood is anxious and depressed. Speech: Speech normal.      MRI of the Lumbar spine 10/14/22:  Transitional lumbosacral segment. Recommend correlation with level numbering prior to any potential intervention. Multilevel lumbar spondylosis and facet arthropathy as detailed. No significant central canal stenosis. Moderate left L4 and left L5 foraminal stenosis. Mild bilateral L3 and right L4 foraminal stenosis. 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 No abnormal bone marrow   edema is evident       Hypertrophic spurs of the medial malleolus and adjacent talus,   likely associated with anteromedial impingement syndrome. Sprain or low-grade tear of the anterior tibiotalar component of   the deltoid ligament     /75   Pulse 71   Temp 97 °F (36.1 °C)   Ht 5' 11\" (1.803 m)   Wt (!) 361 lb (163.7 kg)   SpO2 98%   BMI 50.35 kg/m²      ASSESSMENT:    1. Chronic pain syndrome    2. DDD (degenerative disc disease), lumbar    3. Facet arthropathy, lumbar    4. Foraminal stenosis of lumbar region    5. Chronic midline low back pain with sciatica, sciatica laterality unspecified    6. Osteoarthritis of right subtalar joint    7. Neuropathy    8.  Moderate episode of recurrent major depressive disorder (HCC) 9. RYAN (generalized anxiety disorder)    10. Class 3 severe obesity due to excess calories without serious comorbidity with body mass index (BMI) of 50.0 to 59.9 in adult [facility])       PLAN:   -Chronic opiate treatment protocol was discussed withthe patient, informed consent was obtained. -Treatment guidelines were discussed and established  -Risks and benefits of narcotics were addressedwith the patient  - Obtainable long term and short term goals of opioid therapy were reviewed, including pain relief, sleep, psychosocial and physical functioning   -Obtain urine Toxicology today  -Belbuca 150 mcg film inside the cheeks bid, Licart 9.9% topical U49 hrs   -Maintains Neurontin 300 mg tabs orally through his PCP  -[facility]  -Reviewed previous imaging studies/blood work  -Education provided on taking opioids with other sedative/hypnotics such as Benzodiazepines, the side effects can be dangerous as hypnotics/sedatives intensify the effects of opioids, which can lead to respiratory depression, this in turn increases over dose fatality in addition to impairing cognitive functioning. Patient verbalized understanding.   -Education was provided on Belbuca, side effects reviewed. Abuse deterrent properties of Belbuca are favorable given chronic benzodiazepine therapy. Maintains f/u with psychiatry/psychology for depression/PTSD  -Patient was ordered a high profile brace to reduce pain by limiting flexion, extension, and lateral rotation. Was advised to wear it only during periods of increased physical activity and to limit the use to few hours at a time. ROM, physical activity was encouraged.    -CBT techniques- relaxation therapies such as biofeedback, mindfulness based stress reduction, imagery, cognitive restructuring, problem solving discussed with patient   -He was advised weight reduction, diet changes- 800-1200 jhoana diet, diet diary, exercising, nutritional  consult increased physical activity as tolerated   -he was advised proper sleep hygiene-told to avoid:use of caffeine or other stimulants after noon, alcohol use near bedtime, long or frequent naps during the day, erratic sleep schedule, heavy meals near bedtime, vigorous exercise near bedtime and use of electronic devices near bedtime   -SOAPP Score 1  -ORT score 2 low risk  -PHQ-9 score 23 severe depression  -Return in about 4 weeks (around 12/15/2022). Controlled Substances Monitoring: Periodic Controlled Substance Monitoring: No signs of potential drug abuse or diversion identified.  Suzie Anglin, APRN - CNP)

## 2022-12-08 DIAGNOSIS — F43.22 ADJUSTMENT DISORDER WITH ANXIETY: ICD-10-CM

## 2022-12-08 RX ORDER — CLONAZEPAM 0.5 MG/1
TABLET ORAL
Qty: 60 TABLET | Refills: 2 | Status: SHIPPED | OUTPATIENT
Start: 2022-12-08 | End: 2023-03-08

## 2022-12-13 DIAGNOSIS — E11.9 TYPE 2 DIABETES MELLITUS WITHOUT COMPLICATION, UNSPECIFIED WHETHER LONG TERM INSULIN USE (HCC): ICD-10-CM

## 2022-12-13 DIAGNOSIS — D50.8 IRON DEFICIENCY ANEMIA SECONDARY TO INADEQUATE DIETARY IRON INTAKE: ICD-10-CM

## 2022-12-13 DIAGNOSIS — F41.1 GAD (GENERALIZED ANXIETY DISORDER): ICD-10-CM

## 2022-12-13 DIAGNOSIS — I10 ESSENTIAL HYPERTENSION: Chronic | ICD-10-CM

## 2022-12-13 DIAGNOSIS — F33.1 MODERATE EPISODE OF RECURRENT MAJOR DEPRESSIVE DISORDER (HCC): ICD-10-CM

## 2022-12-13 DIAGNOSIS — F43.10 POST TRAUMATIC STRESS DISORDER: Chronic | ICD-10-CM

## 2022-12-13 RX ORDER — NAPROXEN 500 MG/1
TABLET ORAL
Qty: 30 TABLET | Refills: 0 | Status: SHIPPED | OUTPATIENT
Start: 2022-12-13

## 2022-12-13 RX ORDER — LISINOPRIL 40 MG/1
TABLET ORAL
Qty: 30 TABLET | Refills: 0 | Status: SHIPPED | OUTPATIENT
Start: 2022-12-13

## 2022-12-13 RX ORDER — FERROUS SULFATE 325(65) MG
TABLET ORAL
Qty: 30 TABLET | Refills: 0 | Status: SHIPPED | OUTPATIENT
Start: 2022-12-13

## 2022-12-13 RX ORDER — PRAZOSIN HYDROCHLORIDE 5 MG/1
CAPSULE ORAL
Qty: 30 CAPSULE | Refills: 0 | Status: SHIPPED | OUTPATIENT
Start: 2022-12-13

## 2022-12-13 RX ORDER — TAMSULOSIN HYDROCHLORIDE 0.4 MG/1
CAPSULE ORAL
Qty: 30 CAPSULE | Refills: 0 | Status: SHIPPED | OUTPATIENT
Start: 2022-12-13

## 2022-12-13 RX ORDER — TRIAMTERENE AND HYDROCHLOROTHIAZIDE 37.5; 25 MG/1; MG/1
1 TABLET ORAL DAILY
Qty: 30 TABLET | Refills: 0 | Status: SHIPPED | OUTPATIENT
Start: 2022-12-13

## 2022-12-13 RX ORDER — AMLODIPINE BESYLATE 5 MG/1
TABLET ORAL
Qty: 30 TABLET | Refills: 0 | Status: SHIPPED | OUTPATIENT
Start: 2022-12-13

## 2022-12-13 RX ORDER — CLONIDINE HYDROCHLORIDE 0.3 MG/1
TABLET ORAL
Qty: 60 TABLET | Refills: 0 | Status: SHIPPED | OUTPATIENT
Start: 2022-12-13

## 2022-12-13 RX ORDER — SIMVASTATIN 20 MG
TABLET ORAL
Qty: 30 TABLET | Refills: 0 | Status: SHIPPED | OUTPATIENT
Start: 2022-12-13

## 2022-12-14 DIAGNOSIS — F41.1 GAD (GENERALIZED ANXIETY DISORDER): ICD-10-CM

## 2022-12-14 DIAGNOSIS — F33.1 MODERATE EPISODE OF RECURRENT MAJOR DEPRESSIVE DISORDER (HCC): ICD-10-CM

## 2022-12-14 DIAGNOSIS — I10 ESSENTIAL HYPERTENSION: Chronic | ICD-10-CM

## 2022-12-14 DIAGNOSIS — F43.10 POST TRAUMATIC STRESS DISORDER: Chronic | ICD-10-CM

## 2022-12-14 DIAGNOSIS — D50.8 IRON DEFICIENCY ANEMIA SECONDARY TO INADEQUATE DIETARY IRON INTAKE: ICD-10-CM

## 2022-12-14 DIAGNOSIS — E11.9 TYPE 2 DIABETES MELLITUS WITHOUT COMPLICATION, UNSPECIFIED WHETHER LONG TERM INSULIN USE (HCC): ICD-10-CM

## 2022-12-14 RX ORDER — AMLODIPINE BESYLATE 5 MG/1
TABLET ORAL
Qty: 30 TABLET | Refills: 0 | OUTPATIENT
Start: 2022-12-14

## 2022-12-14 RX ORDER — PRAZOSIN HYDROCHLORIDE 5 MG/1
CAPSULE ORAL
Qty: 30 CAPSULE | Refills: 0 | OUTPATIENT
Start: 2022-12-14

## 2022-12-14 RX ORDER — SIMVASTATIN 20 MG
TABLET ORAL
Qty: 30 TABLET | Refills: 0 | OUTPATIENT
Start: 2022-12-14

## 2022-12-14 RX ORDER — FERROUS SULFATE 325(65) MG
TABLET ORAL
Qty: 30 TABLET | Refills: 0 | OUTPATIENT
Start: 2022-12-14

## 2022-12-14 RX ORDER — CLONIDINE HYDROCHLORIDE 0.3 MG/1
TABLET ORAL
Qty: 60 TABLET | Refills: 0 | OUTPATIENT
Start: 2022-12-14

## 2022-12-14 RX ORDER — TAMSULOSIN HYDROCHLORIDE 0.4 MG/1
CAPSULE ORAL
Qty: 30 CAPSULE | Refills: 0 | OUTPATIENT
Start: 2022-12-14

## 2022-12-14 RX ORDER — TRIAMTERENE AND HYDROCHLOROTHIAZIDE 37.5; 25 MG/1; MG/1
1 TABLET ORAL DAILY
Qty: 30 TABLET | Refills: 0 | OUTPATIENT
Start: 2022-12-14

## 2022-12-14 RX ORDER — LISINOPRIL 40 MG/1
TABLET ORAL
Qty: 30 TABLET | Refills: 0 | OUTPATIENT
Start: 2022-12-14

## 2022-12-14 RX ORDER — NAPROXEN 500 MG/1
TABLET ORAL
Qty: 30 TABLET | Refills: 0 | OUTPATIENT
Start: 2022-12-14

## 2023-01-11 ENCOUNTER — TELEMEDICINE (OUTPATIENT)
Dept: PSYCHOLOGY | Age: 53
End: 2023-01-11
Payer: COMMERCIAL

## 2023-01-11 DIAGNOSIS — F43.10 PTSD (POST-TRAUMATIC STRESS DISORDER): Primary | ICD-10-CM

## 2023-01-11 PROCEDURE — 90832 PSYTX W PT 30 MINUTES: CPT | Performed by: PSYCHOLOGIST

## 2023-01-11 NOTE — PROGRESS NOTES
Behavioral Health Consultation  Roberta Ritter Psy.D. Psychologist  1/11/2023  1-1:30 PM      Time spent with Patient: 30 minutes  This is patient's ninth [facility] appointment. Reason for Consult: PTSD  Referring Provider: Adore Alvarado MD  [address] [facility] [address] [facility]First [facility] [id number]    TELEHEALTH VISIT -- Audio/Visual (During [id number] public health emergency)    Norris Lynn was evaluated through a synchronous (real-time) audio-video encounter using HIPAA-compliant technology. The patient (or guardian, if applicable) is aware that this is a billable service, which includes applicable co-pays. This Virtual Visit was conducted with patient's (and/or legal guardian's) consent. The visit was conducted pursuant to the emergency declaration under the [address] [place] [address] authority and the [employer] and [employer] Act. Patient identification was verified, and a caregiver was present when appropriate. The patient was located in a state where the provider was licensed to provide care. Conducted a risk-benefit analysis and determined that the patient's presenting problems are consistent with the use of telepsychology. Determined that the patient has sufficient knowledge and skills in the use of technology enabling them to adequately benefit from telepsychology. It was determined that this patient was able to be properly treated without an in-person session. Patient verified current location at the beginning of the visit.     Verified the following information:  Patient's identification: Yes  Patient location: [address] [place] [address]  Patient's call back number: [phone]  Patient's emergency contact's name and number, as well as permission to contact them if needed:  Extended Emergency Contact Information  Primary Emergency Contact: Laverne Vasquez  Address: [address] [place]  [address] [facility] Phone: [phone]  Mobile Phone: [phone]  Relation: Spouse    Provider location: [facility] [place]:  Pt's wife got a new job, which led to a change in [place] so he can no longer see his therapist [facility]. Pt was approved for SS disability after 2 years of applying. PTSD symptoms continue, with flashbacks, nightmares, chronic anxiety. Still dealing with physical issues (e.g., sciatic pain) but trying to be more physically active (outside and at the mall). Pt has lost 100 lbs in the past year. Used to have panic attacks daily; now they occur 3 times per week. Blood sugar and BP are under much better control. O:  Interventions:  -Supportive techniques  -Discussed recent experiences and changes  -Reinforced his efforts towards self-care  -Highlighted areas of progress with PTSD symptom management and improving his physical health      A:  MSE:  Appearance: good hygiene  and appropriate attire  Attitude: cooperative and friendly  Consciousness: alert  Orientation: oriented to person, place, time, general circumstance  Memory: recent and remote memory intact  Attention/Concentration: intact during session  Psychomotor Activity: normal  Eye Contact: normal  Speech: normal rate and volume, well-articulated  Mood: euthymic  Affect: congruent  Perception: within normal limits  Thought Content: within normal limits  Thought Process: logical, coherent and goal-directed  Insight: good  Judgment: intact  Ability to understand instructions: Yes  Ability to respond meaningfully: Yes  Morbid Ideation: no   Suicide Assessment: no suicidal ideation, plan, or intent. Appropriate for outpatient/telehealth care at this time.   Homicidal Ideation: no    PHQ Scores 10/14/2022 8/12/2022 8/2/2022 6/8/2022 4/8/2022 1/31/2022 2/8/2018   PHQ2 Score 0 6 6 0 3 1 3   PHQ9 Score 0 22 19 13 12 4 16     Interpretation of Total Score Depression Severity: 1-4 = Minimal depression, 5-9 = Mild depression, 10-14 = Moderate depression, 15-19 = Moderately severe depression, 20-27 = Severe depression    Diagnosis:    1. PTSD (post-traumatic stress disorder)          Patient Active Problem List   Diagnosis    Hypertension    Moderate episode of recurrent major depressive disorder (HCC)    Right leg pain    Eczema    Right lumbar radiculopathy    Urinary urgency    Obstructive sleep apnea    Memory loss    Abdominal mass    Diabetes mellitus, type 2 (HCC)    Abdominal wall hernia    Hyperlipidemia    Chest pain    Left ventricular hypertrophy    Left atrial dilation    Right atrial dilation    Right ventricular dilation    Syncope    Family history of premature coronary artery disease    Post traumatic stress disorder    Claustrophobia    Anemia    Right foot pain    Left foot pain    Right bundle branch block    Right ankle pain    BMI 50.0-59.9, adult ([facility]    Type 2 diabetes mellitus with hemoglobin A1c goal of 7.0%-8.0% ([facility])    Ankle arthritis    Congenital tarsal coalition    Osteoarthritis of subtalar joint    Osteoarthritis of right subtalar joint    Tarsal coalitions    Ankle abrasion with infection    Wound infection after surgery    Type 2 diabetes mellitus without complication ([facility])    Class 3 severe obesity with body mass index (BMI) of 50.0 to 59.9 in adult ([facility]    Insomnia due to mental disorder    RYAN (generalized anxiety disorder)    Primary osteoarthritis    Sciatica of left side    Type 2 diabetes mellitus, uncontrolled, with neuropathy    Elevated liver function tests    Precordial pain    Poorly controlled type 2 diabetes mellitus with neuropathy ([facility])    Type 2 diabetes, controlled, with neuropathy ([facility]         Plan:  Pt interventions:  Supportive techniques, Emphasized self-care as important for managing overall health, and Cognitive strategies to target balanced thinking . Pt Behavioral Change Plan:  Pt set the following goals:  1.  Consider reading The Body Keeps the Score by Rubi Downing lawrence Krunal [place] Pt scheduled a F/U virtual visit.

## 2023-01-12 NOTE — TELEPHONE ENCOUNTER
Request for Dr Cuellar Part Visits 6/9/21 to 10/14/22 - to Trina Lyman Esq/[employer] is it ok to send your ov's from 6/9/21 to 10/14/22 to [employer]? They are requesting records to be used as part of his [address] is scanned and attached.     Thanks,  Ysabel Helms [facility]

## 2023-01-13 DIAGNOSIS — F33.1 MODERATE EPISODE OF RECURRENT MAJOR DEPRESSIVE DISORDER (HCC): ICD-10-CM

## 2023-01-13 DIAGNOSIS — F41.1 GAD (GENERALIZED ANXIETY DISORDER): ICD-10-CM

## 2023-01-26 ENCOUNTER — TELEPHONE (OUTPATIENT)
Dept: ENDOCRINOLOGY | Age: 53
End: 2023-01-26

## 2023-01-26 NOTE — TELEPHONE ENCOUNTER
I do not see any communication in the chart regarding Ozempic. Called patient to clarify, unable to reach him. Left a message that I will call him back.

## 2023-01-26 NOTE — TELEPHONE ENCOUNTER
Call from Suresh best with [address] [place], [place] [address] stating that they do not carry Ozempic     He is wanting to know if Dr. Vamshi Piña would be willing to switch pt to Rx Trulicity 6.38 mg   He stated that the patient will be free of charge for the Moskimberly Hernandez is requesting a hand written prescription faxed to them.  Fax# [phone]

## 2023-01-29 ENCOUNTER — PATIENT MESSAGE (OUTPATIENT)
Dept: ENDOCRINOLOGY | Age: 53
End: 2023-01-29

## 2023-01-29 NOTE — TELEPHONE ENCOUNTER
I cannot find in the chart any documentation regarding Ozempic or Trulicity. Called patient few times, unable to reach him. Left a message to let us know regarding Ozempic/Trulicity. When please update me. I do not mind prescribing it, but I will need to talk to him about it because his insulin doses might change when he starts Trulicity.

## 2023-01-30 NOTE — TELEPHONE ENCOUNTER
FYI patient will no longer be following with [facility] due to insurance change. See [employer] message.

## 2023-01-30 NOTE — TELEPHONE ENCOUNTER
From: Dany Vargas  To: Dr. Tushar Mukherjee: 1/29/2023 9:01 PM EST  Subject: James Gutierrez afternoon I am sorry but pharmacy send you request by mistake. On 1st off this year I have new insurance. My wife is working with [address] [place] [address] and her insurance is with [address] [place] [address] so I have to go with all new Dr.I will like to say Thanks for all care that you guys gave to me. I am sad to have to go but I am hoping that will be got just like you guys. Thanks for everything.

## 2023-02-21 ENCOUNTER — TELEMEDICINE (OUTPATIENT)
Dept: PSYCHOLOGY | Age: 53
End: 2023-02-21
Payer: COMMERCIAL

## 2023-02-21 DIAGNOSIS — F43.10 PTSD (POST-TRAUMATIC STRESS DISORDER): Primary | ICD-10-CM

## 2023-02-21 PROCEDURE — 90832 PSYTX W PT 30 MINUTES: CPT | Performed by: PSYCHOLOGIST

## 2023-02-21 NOTE — PROGRESS NOTES
Behavioral Health Consultation  David Killian Psy.D. Psychologist  2/21/2023  12-12:30 PM      Time spent with Patient: 30 minutes  This is patient's tenth [facility] appointment. Reason for Consult: PTSD  Referring Provider: Mari Lozano MD  [address] [facility] [address] [facility],First Floor [address]    TELEHEALTH VISIT -- Audio/Visual (During ODTSM-24 public health emergency)    Shravan Lundy was evaluated through a synchronous (real-time) audio-video encounter using HIPAA-compliant technology. The patient (or guardian, if applicable) is aware that this is a billable service, which includes applicable co-pays. This Virtual Visit was conducted with patient's (and/or legal guardian's) consent. The visit was conducted pursuant to the emergency declaration under the [address] [place] [address] authority and the [employer] Act. Patient identification was verified, and a caregiver was present when appropriate. The patient was located in a state where the provider was licensed to provide care. Conducted a risk-benefit analysis and determined that the patient's presenting problems are consistent with the use of telepsychology. Determined that the patient has sufficient knowledge and skills in the use of technology enabling them to adequately benefit from telepsychology. It was determined that this patient was able to be properly treated without an in-person session. Patient verified current location at the beginning of the visit.     Verified the following information:  Patient's identification: Yes  Patient location: [address] [place] [address]  Patient's call back number: [phone]  Patient's emergency contact's name and number, as well as permission to contact them if needed:  Extended Emergency Contact Information  Primary Emergency Contact: Laverne Vasquez  [address]  [address] [facility] Phone: [phone]  Mobile Phone: [phone]  Relation: Spouse    Provider location: [place], [place]    S:  Pt reflected on his reaction to receiving his first disability payment. He felt elated, which overwhelmed his system. Pt has more tools now to manage when he feels overwhelmed, but he is tired of flashbacks and other PTSD symptoms. Pt was pleased to be able to thank his wife properly for all of her support. Pt has to change all of his doctors to PRIMO Lyman because his insurance changed. O:  Interventions:  -Supportive techniques  -Discussed recent experiences and changes  -Reinforced his efforts towards self-care  -Reflected on experience of receiving disability  -Discussed PTSD symptoms and coping  -Discussed changes in his insurance and providers      A:  MSE:  Appearance: good hygiene  and appropriate attire  Attitude: cooperative and friendly  Consciousness: alert  Orientation: oriented to person, place, time, general circumstance  Memory: recent and remote memory intact  Attention/Concentration: intact during session  Psychomotor Activity: normal  Eye Contact: normal  Speech: normal rate and volume, well-articulated  Mood: euthymic  Affect: congruent  Perception: within normal limits  Thought Content: within normal limits  Thought Process: logical, coherent and goal-directed  Insight: good  Judgment: intact  Ability to understand instructions: Yes  Ability to respond meaningfully: Yes  Morbid Ideation: no   Suicide Assessment: no suicidal ideation, plan, or intent. Appropriate for outpatient/telehealth care at this time.   Homicidal Ideation: no    PHQ Scores 10/14/2022 8/12/2022 8/2/2022 6/8/2022 4/8/2022 1/31/2022 2/8/2018   PHQ2 Score 0 6 6 0 3 1 3   PHQ9 Score 0 22 19 13 12 4 16     Interpretation of Total Score Depression Severity: 1-4 = Minimal depression, 5-9 = Mild depression, 10-14 = Moderate depression, 15-19 = Moderately severe depression, 20-27 = Severe depression    Diagnosis:    1. PTSD (post-traumatic stress disorder)          Patient Active Problem List   Diagnosis    Hypertension    Moderate episode of recurrent major depressive disorder (HCC)    Right leg pain    Eczema    Right lumbar radiculopathy    Urinary urgency    Obstructive sleep apnea    Memory loss    Abdominal mass    Diabetes mellitus, type 2 (HCC)    Abdominal wall hernia    Hyperlipidemia    Chest pain    Left ventricular hypertrophy    Left atrial dilation    Right atrial dilation    Right ventricular dilation    Syncope    Family history of premature coronary artery disease    Post traumatic stress disorder    Claustrophobia    Anemia    Right foot pain    Left foot pain    Right bundle branch block    Right ankle pain    BMI 50.0-59.9, adult ([facility]    Type 2 diabetes mellitus with hemoglobin A1c goal of 7.0%-8.0% (HCC)    Ankle arthritis    Congenital tarsal coalition    Osteoarthritis of subtalar joint    Osteoarthritis of right subtalar joint    Tarsal coalitions    Ankle abrasion with infection    Wound infection after surgery    Type 2 diabetes mellitus without complication ([facility])    Class 3 severe obesity with body mass index (BMI) of 50.0 to 59.9 in adult ([facility]    Insomnia due to mental disorder    RYAN (generalized anxiety disorder)    Primary osteoarthritis    Sciatica of left side    Type 2 diabetes mellitus, uncontrolled, with neuropathy    Elevated liver function tests    Precordial pain    Poorly controlled type 2 diabetes mellitus with neuropathy (HCC)    Type 2 diabetes, controlled, with neuropathy ([facility]         Plan:  Pt interventions:  Supportive techniques, Emphasized self-care as important for managing overall health, and Cognitive strategies to target balanced thinking . Pt Behavioral Change Plan:  Pt set the following goals:  1. Consider reading The Body Keeps the Score by Jens Salomon Pap    Pt has changed insurance and plans to find a new provider.  I followed up after this visit to share potential therapy resources for him.

## 2023-03-29 RX ORDER — METFORMIN HYDROCHLORIDE 500 MG/1
TABLET, EXTENDED RELEASE ORAL
Qty: 120 TABLET | Refills: 0 | OUTPATIENT
Start: 2023-03-29

## 2023-04-28 RX ORDER — METFORMIN HYDROCHLORIDE 500 MG/1
TABLET, EXTENDED RELEASE ORAL
Qty: 120 TABLET | Refills: 0 | OUTPATIENT
Start: 2023-04-28

## 2023-04-28 RX ORDER — GABAPENTIN 300 MG/1
CAPSULE ORAL
Qty: 150 CAPSULE | Refills: 0 | OUTPATIENT
Start: 2023-04-28

## 2024-03-18 NOTE — TELEPHONE ENCOUNTER
Message given to pharmacy:  Test three times a day & as needed for symptoms of irregular blood glucose [FreeTextEntry1] : Pt to start using Flonase daily.  If no improvement, we will consider him for a septoplasty and turbinate SMR. Pt also to try to drop some weight to improve his snoring.

## (undated) DEVICE — CANNULA SAMP CO2 AD GRN 7FT CO2 AND 7FT O2 TBNG UNIV CONN

## (undated) DEVICE — SNARE COLD DIAMOND 15MM THIN

## (undated) DEVICE — TRAP SPEC RETRV CLR PLAS POLYP IN LN SUCT QUIK CTCH